# Patient Record
Sex: FEMALE | Race: WHITE | Employment: OTHER | URBAN - METROPOLITAN AREA
[De-identification: names, ages, dates, MRNs, and addresses within clinical notes are randomized per-mention and may not be internally consistent; named-entity substitution may affect disease eponyms.]

---

## 2018-02-05 ENCOUNTER — HOSPITAL ENCOUNTER (OUTPATIENT)
Age: 68
Setting detail: OBSERVATION
Discharge: HOME HEALTH | End: 2018-02-08
Attending: EMERGENCY MEDICINE | Admitting: FAMILY MEDICINE
Payer: MEDICARE

## 2018-02-05 ENCOUNTER — APPOINTMENT (OUTPATIENT)
Dept: CT IMAGING | Age: 68
End: 2018-02-05
Attending: EMERGENCY MEDICINE
Payer: MEDICARE

## 2018-02-05 DIAGNOSIS — G20 PARKINSON'S DISEASE (HCC): Primary | ICD-10-CM

## 2018-02-05 DIAGNOSIS — G25.2 COARSE TREMORS: ICD-10-CM

## 2018-02-05 LAB
ALBUMIN SERPL-MCNC: 4.1 G/DL (ref 3.2–4.6)
ALBUMIN/GLOB SERPL: 1.2 {RATIO} (ref 1.2–3.5)
ALP SERPL-CCNC: 68 U/L (ref 50–136)
ALT SERPL-CCNC: 20 U/L (ref 12–65)
ANION GAP SERPL CALC-SCNC: 15 MMOL/L (ref 7–16)
AST SERPL-CCNC: 34 U/L (ref 15–37)
BASOPHILS # BLD: 0.1 K/UL (ref 0–0.2)
BASOPHILS NFR BLD: 0 % (ref 0–2)
BILIRUB SERPL-MCNC: 1.1 MG/DL (ref 0.2–1.1)
BUN SERPL-MCNC: 20 MG/DL (ref 8–23)
CALCIUM SERPL-MCNC: 9.5 MG/DL (ref 8.3–10.4)
CHLORIDE SERPL-SCNC: 99 MMOL/L (ref 98–107)
CK SERPL-CCNC: 302 U/L (ref 21–215)
CO2 SERPL-SCNC: 25 MMOL/L (ref 21–32)
CREAT SERPL-MCNC: 0.78 MG/DL (ref 0.6–1)
DIFFERENTIAL METHOD BLD: ABNORMAL
EOSINOPHIL # BLD: 0 K/UL (ref 0–0.8)
EOSINOPHIL NFR BLD: 0 % (ref 0.5–7.8)
ERYTHROCYTE [DISTWIDTH] IN BLOOD BY AUTOMATED COUNT: 13.4 % (ref 11.9–14.6)
GLOBULIN SER CALC-MCNC: 3.3 G/DL (ref 2.3–3.5)
GLUCOSE SERPL-MCNC: 63 MG/DL (ref 65–100)
HCT VFR BLD AUTO: 43.4 % (ref 35.8–46.3)
HGB BLD-MCNC: 14.5 G/DL (ref 11.7–15.4)
IMM GRANULOCYTES # BLD: 0 K/UL (ref 0–0.5)
IMM GRANULOCYTES NFR BLD AUTO: 0 % (ref 0–5)
LYMPHOCYTES # BLD: 2.1 K/UL (ref 0.5–4.6)
LYMPHOCYTES NFR BLD: 17 % (ref 13–44)
MCH RBC QN AUTO: 29.4 PG (ref 26.1–32.9)
MCHC RBC AUTO-ENTMCNC: 33.4 G/DL (ref 31.4–35)
MCV RBC AUTO: 87.9 FL (ref 79.6–97.8)
MONOCYTES # BLD: 1.1 K/UL (ref 0.1–1.3)
MONOCYTES NFR BLD: 9 % (ref 4–12)
NEUTS SEG # BLD: 8.9 K/UL (ref 1.7–8.2)
NEUTS SEG NFR BLD: 74 % (ref 43–78)
PLATELET # BLD AUTO: 327 K/UL (ref 150–450)
PMV BLD AUTO: 9.7 FL (ref 10.8–14.1)
POTASSIUM SERPL-SCNC: 4.1 MMOL/L (ref 3.5–5.1)
PROT SERPL-MCNC: 7.4 G/DL (ref 6.3–8.2)
RBC # BLD AUTO: 4.94 M/UL (ref 4.05–5.25)
SODIUM SERPL-SCNC: 139 MMOL/L (ref 136–145)
WBC # BLD AUTO: 12.2 K/UL (ref 4.3–11.1)

## 2018-02-05 PROCEDURE — 99285 EMERGENCY DEPT VISIT HI MDM: CPT | Performed by: EMERGENCY MEDICINE

## 2018-02-05 PROCEDURE — 77030011943

## 2018-02-05 PROCEDURE — 51701 INSERT BLADDER CATHETER: CPT | Performed by: EMERGENCY MEDICINE

## 2018-02-05 PROCEDURE — 85025 COMPLETE CBC W/AUTO DIFF WBC: CPT | Performed by: EMERGENCY MEDICINE

## 2018-02-05 PROCEDURE — 74011250636 HC RX REV CODE- 250/636: Performed by: EMERGENCY MEDICINE

## 2018-02-05 PROCEDURE — 96361 HYDRATE IV INFUSION ADD-ON: CPT | Performed by: EMERGENCY MEDICINE

## 2018-02-05 PROCEDURE — 70450 CT HEAD/BRAIN W/O DYE: CPT

## 2018-02-05 PROCEDURE — 82550 ASSAY OF CK (CPK): CPT | Performed by: EMERGENCY MEDICINE

## 2018-02-05 PROCEDURE — 96376 TX/PRO/DX INJ SAME DRUG ADON: CPT | Performed by: EMERGENCY MEDICINE

## 2018-02-05 PROCEDURE — 81003 URINALYSIS AUTO W/O SCOPE: CPT | Performed by: EMERGENCY MEDICINE

## 2018-02-05 PROCEDURE — 80053 COMPREHEN METABOLIC PANEL: CPT | Performed by: EMERGENCY MEDICINE

## 2018-02-05 PROCEDURE — 96375 TX/PRO/DX INJ NEW DRUG ADDON: CPT | Performed by: EMERGENCY MEDICINE

## 2018-02-05 RX ORDER — LORAZEPAM 2 MG/ML
1 INJECTION INTRAMUSCULAR
Status: COMPLETED | OUTPATIENT
Start: 2018-02-05 | End: 2018-02-05

## 2018-02-05 RX ORDER — PHENAZOPYRIDINE HYDROCHLORIDE 95 MG/1
100 TABLET ORAL
Status: DISCONTINUED | OUTPATIENT
Start: 2018-02-05 | End: 2018-02-05

## 2018-02-05 RX ADMIN — SODIUM CHLORIDE 1000 ML: 900 INJECTION, SOLUTION INTRAVENOUS at 18:30

## 2018-02-05 RX ADMIN — LORAZEPAM 1 MG: 2 INJECTION, SOLUTION INTRAMUSCULAR; INTRAVENOUS at 22:13

## 2018-02-05 RX ADMIN — LORAZEPAM 1 MG: 2 INJECTION, SOLUTION INTRAMUSCULAR; INTRAVENOUS at 18:31

## 2018-02-05 NOTE — IP AVS SNAPSHOT
[address] 322 [facility] 
[phone] Patient: Bhumi Davila MRN: [id number] :1950 About your hospitalization You were admitted on:  2018 You last received care in the:  [facility] 6 MED SURG You were discharged on:  2018 Why you were hospitalized Your primary diagnosis was:  Not on File Your diagnoses also included:  Tremors Of Nervous System, Atrial Fibrillation (Hcc), Presence Of Cardiac Pacemaker, Hypertension, Hyperlipidemia, Parkinson Disease (Hcc), Meningioma (Hcc) Follow-up Information Follow up With Details Comments Contact Info [facility]   [phone] This is the same agency that your  is using. PT and Nursing Alissa Gray MD On 2018 at 11:00 [address] [phone] Aviva Momin MD  as scheduled [facility] [address] [address] 
[phone] Your Scheduled Appointments 2018  3:30 PM EST Follow Up with BSN ROBERT Lopez [facility] [place] ([facility]UR[facility] [place]) [place] [address] [address]  
[phone] Discharge Orders None A check gary indicates which time of day the medication should be taken. My Medications CHANGE how you take these medications Instructions Each Dose to Equal  
 Morning Noon Evening Bedtime * carbidopa-levodopa ER  mg per tablet Commonly known as:  SINEMET CR What changed:  Another medication with the same name was removed. Continue taking this medication, and follow the directions you see here. Your next dose is:  bedtime 1 tab Qhs  
     
   
   
   
  
  
 * carbidopa-levodopa  mg per tablet Commonly known as:  Sinemet-25/250 What changed:  Another medication with the same name was removed.  Continue taking this medication, and follow the directions you see here. Your next dose is:  Resume your home schedule Take 1 Tab by mouth four (4) times daily. 1 Tab * Notice: This list has 2 medication(s) that are the same as other medications prescribed for you. Read the directions carefully, and ask your doctor or other care provider to review them with you. CONTINUE taking these medications Instructions Each Dose to Equal  
 Morning Noon Evening Bedtime FISH OIL 1,000 mg Cap Generic drug:  omega-3 fatty acids-vitamin e Your next dose is:  2-9 Take 1 Cap by mouth. 1 Cap KLOR-CON M20 PO Your next dose is:  2-9 Take  by mouth daily. lovastatin 40 mg tablet Commonly known as:  MEVACOR Your next dose is:  bedtime Take 40 mg by mouth nightly. 40 mg  
    
   
   
   
  
  
 metoprolol succinate 25 mg XL tablet Commonly known as:  TOPROL-XL Your next dose is: This evening Take  by mouth two (2) times a day. PREMARIN 0.625 mg tablet Generic drug:  conjugated estrogens Your next dose is:  2-9 Take  by mouth daily. STOP taking these medications   
 multivitamin tablet Commonly known as:  ONE A DAY  
   
  
 warfarin 5 mg tablet Commonly known as:  COUMADIN Discharge Instructions DISCHARGE SUMMARY from Nurse PATIENT INSTRUCTIONS: 
 
 
F-face looks uneven A-arms unable to move or move unevenly S-speech slurred or non-existent T-time-call 911 as soon as signs and symptoms begin-DO NOT go  
 Back to bed or wait to see if you get better-TIME IS BRAIN. Warning Signs of HEART ATTACK Call 911 if you have these symptoms: 
? Chest discomfort. Most heart attacks involve discomfort in the center of the chest that lasts more than a few minutes, or that goes away and comes back. It can feel like uncomfortable pressure, squeezing, fullness, or pain. ? Discomfort in other areas of the upper body. Symptoms can include pain or discomfort in one or both arms, the back, neck, jaw, or stomach. ? Shortness of breath with or without chest discomfort. ? Other signs may include breaking out in a cold sweat, nausea, or lightheadedness. Don't wait more than five minutes to call [address]! Fast action can save your life. Calling 911 is almost always the fastest way to get lifesaving treatment. Emergency Medical Services staff can begin treatment when they arrive  up to an hour sooner than if someone gets to the hospital by car. The discharge information has been reviewed with the patient. The patient verbalized understanding. Discharge medications reviewed with the patient and appropriate educational materials and side effects teaching were provided. ___________________________________________________________________________________________________________________________________ ACO Transitions of Care Introducing [employer] [address] offers a voluntary care coordination program to provide high quality service and care to [facility] fee-for-service beneficiaries. Analy Jose Luis was designed to help you enhance your health and well-being through the following services: ? Transitions of Care  support for individuals who are transitioning from one care setting to another (example: Hospital to home). ?  Chronic and Complex Care Coordination  support for individuals and caregivers of those with serious or chronic illnesses or with more than one chronic (ongoing) condition and those who take a number of different medications. If you meet specific medical criteria, a [facility] [address] may call you directly to coordinate your care with your primary care physician and your other care providers. For questions about the [facility] programs, please, contact your physicians office. For general questions or additional information about Accountable Care Organizations: 
Please visit www.medicare.gov/acos. html or call 1-800-MEDICARE (1-714.826.4207) TTY users should call 0-936.238.1444. Overblog Announcement We are excited to announce that we are making your provider's discharge notes available to you in Overblog. You will see these notes when they are completed and signed by the physician that discharged you from your recent hospital stay. If you have any questions or concerns about any information you see in Overblog, please call the Health Information Department where you were seen or reach out to your Primary Care Provider for more information about your plan of care. Introducing [address]! Dear Laray Sever: 
Thank you for requesting a Overblog account. Our records indicate that you already have an active Overblog account. You can access your account anytime at [email or web address] Did you know that you can access your hospital and ER discharge instructions at any time in Overblog? You can also review all of your test results from your hospital stay or ER visit. Additional Information If you have questions, please visit the Frequently Asked Questions section of the Overblog website at [email or web address] Remember, Overblog is NOT to be used for urgent needs. For medical emergencies, dial 911. Now available from your iPhone and Android! Unresulted Labs-Please follow up with your PCP about these lab tests Order Current Status JOI QL, W/REFLEX CASCADE In process CULTURE, BLOOD Preliminary result CULTURE, BLOOD Preliminary result Providers Seen During Your Hospitalization Provider Specialty Primary office phone Erasmo Mix MD Emergency Medicine [phone] Kenna Toussaint MD Emergency Medicine [phone] Disha Ray MD Emergency Medicine [phone] José Zeng MD [facility] [phone] Immunizations Administered for This Admission Name Date  
 TB Skin Test (PPD) Intradermal  Deferred (), 2/6/2018 Your Primary Care Physician (PCP) Primary Care Physician Office Phone Office Fax [facility] [phone] You are allergic to the following Allergen Reactions Codeine Unknown (comments) Penicillins Unknown (comments) Sulfa (Sulfonamide Antibiotics) Unknown (comments) Recent Documentation Height Weight BMI Smoking Status 1.626 m 47.6 kg 18.02 kg/m2 Never Smoker Emergency Contacts Name Discharge Info Relation Home Work Mobile CliftonKevin DISCHARGE CAREGIVER [3] Spouse [3] [phone] Summer CAREGIVER [3] Son [22] [phone] Patient Belongings The following personal items are in your possession at time of discharge: 
     Visual Aid: None Discharge Instructions Attachments/References TREMOR: BENIGN ESSENTIAL (ENGLISH) Patient Handouts Benign Essential Tremor: Care Instructions Your Care Instructions Benign essential tremor is a medical term for shaking that you can't control. Your hand or fingers may shake when you lift a cup or point at something. Or your voice may shake when you speak. This type of tremor is not harmful. It is not caused by a stroke or Parkinson's disease. Some things can affect how much you shake. For example, drinking or eating something with caffeine may make tremors worse for a while. Some medicines also can increase tremors. These include antidepressants and too much thyroid replacement. Talk to your doctor if you think one of your medicines makes your tremors worse. If you are self-conscious about your tremors, there are some things you can do to reduce them or make them less noticeable. This includes taking medicine. Follow-up care is a key part of your treatment and safety. Be sure to make and go to all appointments, and call your doctor if you are having problems. It's also a good idea to know your test results and keep a list of the medicines you take. How can you care for yourself at home? · Take your medicines exactly as prescribed. Call your doctor if you think you are having a problem with your medicine. Some medicines that help control tremors have to be taken every day, even if you are not having tremors. You will get more details on the specific medicines your doctor prescribes. · Get plenty of rest. 
· Eat a balanced, healthy diet. · Try to reduce stress. Regular exercise and massages may help. · Limit alcohol. Heavy drinking can make your tremors worse. · Avoid drinks or foods with caffeine if they make your tremors worse. These include tea, cola, coffee, and chocolate. · Wear a heavy bracelet or watch. This adds a little weight to your hand. The extra weight may reduce tremors. · Drink from cups or glasses that are only half full. You may also want to try drinking with a straw. When should you call for help? Watch closely for changes in your health, and be sure to contact your doctor if: 
? · You notice your tremors are getting worse. ? · You can't do your everyday activities because of your tremors. ? · You are sad and embarrassed about your shaking. Where can you learn more? Go to http://rylee-harvey.info/. Enter B746 in the search box to learn more about \"Benign Essential Tremor: Care Instructions. \" Current as of: October 14, 2016 Content Version: 11.4 © 2006-2017 Healthwise, Incorporated. Care instructions adapted under license by [employer] (which disclaims liability or warranty for this information). If you have questions about a medical condition or this instruction, always ask your healthcare professional. [address] any warranty or liability for your use of this information. Please provide this summary of care documentation to your next provider. Signatures-by signing, you are acknowledging that this After Visit Summary has been reviewed with you and you have received a copy. Patient Signature:  ____________________________________________________________ Date:  ____________________________________________________________  
  
Radha Medico Provider Signature:  ____________________________________________________________ Date:  ____________________________________________________________

## 2018-02-05 NOTE — ED PROVIDER NOTES
HPI Comments: 3:19 PM Pt was seen in triage, brief history and physical obtained and orders placed. Awaiting bed in the back. Shaila Hanley MD    76-year-old female with known Parkinson's and significant baseline tremors states that her tremors have gotten worse over the past month. Patient takes Sinemet as prescribed. Denies any infectious symptoms such as fever, dysuria, cough. When asked why this might have occurred she said it could be related to her eating a lot of honey yesterday. Patient is a 75 y.o. female presenting with tremors. The history is provided by the patient. No  was used. Tremors           Past Medical History:   Diagnosis Date    Arrhythmia 7/14/2016    Atrial fibrillation (HCC) 8/24/2015    Dry eye     Heart disease 7/14/2016    High blood pressure 7/14/2016    History of basal cell cancer 7/14/2016    Hyperlipidemia 7/14/2016    Hypertension 7/14/2016    Presence of cardiac pacemaker 8/24/2015    Tremor 7/14/2016       Past Surgical History:   Procedure Laterality Date    HX COLONOSCOPY  12/2011    HX HEART CATHETERIZATION      HX OTHER SURGICAL      Cardiac Electrophysiology Mapping and Ablation    HX OTHER SURGICAL      Lung Biopsy    HX OTHER SURGICAL  1982    skin cancer excision, basal cell carcinoma on forehead    HX OTHER SURGICAL  6/12    Cardiac pacemaker placement    HX PARTIAL HYSTERECTOMY      Abdominal     HX SALPINGO-OOPHORECTOMY      Laparoscopic, with MARIA ELENA    HX TONSILLECTOMY           Family History:   Problem Relation Age of Onset    Heart Disease Mother     Thyroid Disease Mother     Heart Disease Father     Cancer Father      Lung Cancer       Social History     Social History    Marital status:      Spouse name: N/A    Number of children: N/A    Years of education: N/A     Occupational History    Not on file.      Social History Main Topics    Smoking status: Never Smoker    Smokeless tobacco: Never Used   [facility] Alcohol use No    Drug use: No    Sexual activity: Not on file     Other Topics Concern    Not on file     Social History Narrative         ALLERGIES: Codeine; Penicillins; and Sulfa (sulfonamide antibiotics)    Review of Systems   Neurological: Positive for tremors. Vitals:    02/05/18 1516   BP: 134/73   Resp: 18   Temp: 97.7 °F (36.5 °C)   Weight: 47.6 kg (105 lb)   Height: 5' 4\" (1.626 m)            Physical Exam   Constitutional: She is oriented to person, place, and time. She appears well-developed and well-nourished. No distress. Disheveled, unkempt female   HENT:   Head: Normocephalic and atraumatic. Eyes: Conjunctivae and EOM are normal. Pupils are equal, round, and reactive to light. Neck: Normal range of motion. Neck supple. Cardiovascular: Normal rate, regular rhythm and normal heart sounds. Pulmonary/Chest: Effort normal and breath sounds normal. No respiratory distress. She has no wheezes. She has no rales. Abdominal: Soft. She exhibits no distension. There is no tenderness. There is no rebound. Musculoskeletal: Normal range of motion. She exhibits no edema or tenderness. Neurological: She is alert and oriented to person, place, and time. Severe upper extremity tremulousness and shaking. Normal speech. Symmetric smile. Good strength in bilateral upper and lower extremities. Skin: Skin is warm and dry. No rash noted. She is not diaphoretic. Psychiatric: She has a normal mood and affect. Her behavior is normal.   Vitals reviewed. MDM  Number of Diagnoses or Management Options  Parkinson's disease [facility]): new and does not require workup  Diagnosis management comments: CT demonstrates meningioma which could be contributing to the patient's behavior. Discussed admission with hospitalist who suggested a neuro consult. Total neurology has been called. Patient will be signed out to Dr. Mario Whitaker pending  Results and recommendations.        Amount and/or Complexity of Data Reviewed  Clinical lab tests: reviewed and ordered (Results for orders placed or performed during the hospital encounter of 02/05/18  -CBC WITH AUTOMATED DIFF       Result                                            Value                         Ref Range                       WBC                                               12.2 (H)                      4.3 - 11.1 K/uL                 RBC                                               4.94                          4.05 - 5.25 M/uL                HGB                                               14.5                          11.7 - 15.4 g/dL                HCT                                               43.4                          35.8 - 46.3 %                   MCV                                               87.9                          79.6 - 97.8 FL                  MCH                                               29.4                          26.1 - 32.9 PG                  MCHC                                              33.4                          31.4 - 35.0 g/dL                RDW                                               13.4                          11.9 - 14.6 %                   PLATELET                                          327                           150 - 450 K/uL                  MPV                                               9.7 (L)                       10.8 - 14.1 FL                  DF                                                AUTOMATED                                                     NEUTROPHILS                                       74                            43 - 78 %                       LYMPHOCYTES                                       17                            13 - 44 %                       MONOCYTES                                         9                             4.0 - 12.0 %                    EOSINOPHILS                                       0 (L)                         0.5 - 7.8 % BASOPHILS                                         0                             0.0 - 2.0 %                     IMMATURE GRANULOCYTES                             0                             0.0 - 5.0 %                     ABS. NEUTROPHILS                                  8.9 (H)                       1.7 - 8.2 K/UL                  ABS. LYMPHOCYTES                                  2.1                           0.5 - 4.6 K/UL                  ABS. MONOCYTES                                    1.1                           0.1 - 1.3 K/UL                  ABS. EOSINOPHILS                                  0.0                           0.0 - 0.8 K/UL                  ABS. BASOPHILS                                    0.1                           0.0 - 0.2 K/UL                  ABS. IMM.  GRANS.                                  0.0                           0.0 - 0.5 K/UL             -METABOLIC PANEL, COMPREHENSIVE       Result                                            Value                         Ref Range                       Sodium                                            139                           136 - 145 mmol/L                Potassium                                         4.1                           3.5 - 5.1 mmol/L                Chloride                                          99                            98 - 107 mmol/L                 CO2                                               25                            21 - 32 mmol/L                  Anion gap                                         15                            7 - 16 mmol/L                   Glucose                                           63 (L)                        65 - 100 mg/dL                  BUN                                               20                            8 - 23 MG/DL                    Creatinine                                        0.78                          0.6 - 1.0 MG/DL                 GFR est AA                                        >60                           >60 ml/min/1.73m2               GFR est non-AA                                    >60                           >60 ml/min/1.73m2               Calcium                                           9.5                           8.3 - 10.4 MG/DL                Bilirubin, total                                  1.1                           0.2 - 1.1 MG/DL                 ALT (SGPT)                                        20                            12 - 65 U/L                     AST (SGOT)                                        34                            15 - 37 U/L                     Alk. phosphatase                                  68                            50 - 136 U/L                    Protein, total                                    7.4                           6.3 - 8.2 g/dL                  Albumin                                           4.1                           3.2 - 4.6 g/dL                  Globulin                                          3.3                           2.3 - 3.5 g/dL                  A-G Ratio                                         1.2                           1.2 - 3.5                  -CK       Result                                            Value                         Ref Range                       CK                                                302 (H)                       21 - 215 U/L               )  Tests in the radiology section of CPT®: ordered and reviewed (Ct Head Wo Cont    Result Date: 2/5/2018  CT HEAD WITHOUT CONTRAST HISTORY:  Tremors. . COMPARISON: None. TECHNIQUE: Axial imaging was performed without intravenous contrast utilizing 5mm slice thickness. Sagittal and coronal reformats were performed. Radiation dose reduction techniques were used for this study.  Our CT scanner uses one or all of the following: Automated exposure control, adjustment of the MAS or KUB according to patient's size and iterative reconstruction. FINDINGS:    *BRAIN:    -  There are no early signs of territorial or lacunar infarction by CT.    -  Round well-defined hyperdense mass in the right superior parietal parafalcine region measuring 1.7 x 1.4 cm.    -  For patient's age, the scattered areas of white matter hypodensities may represent a chronic small vessel white matter ischemia. However this is nonspecific. *VISUALIZED PARANASAL SINUSES: Well aerated. *MASTOIDS:  Clear. *CALVARIUM AND SCALP: Unremarkable. IMPRESSION: No acute abnormalities. Findings most consistent with a right superior medial parietal meningioma. Recommend MRI with and without contrast for confirmation. Date of Dictation: 2/5/2018 10:27 PM     )  Review and summarize past medical records: yes  Discuss the patient with other providers: yes  Independent visualization of images, tracings, or specimens: yes    Risk of Complications, Morbidity, and/or Mortality  Presenting problems: moderate  Diagnostic procedures: moderate  Management options: moderate    Patient Progress  Patient progress: stable        ED Course   Comment By Time   Discussed admission with hospitalist who recommends [facility] neurology consult. They have been paged. Awaiting call back. Patient resting continues to exhibit severe tremors. No previous mention of meningioma. No previous CT scans in care everywhere for in [address] [place] [address]. We'll need to transition care to physician Man pending Teleneurology consult and discussion of final disposition.  Brandon Hemphill MD 02/05 5919       Procedures

## 2018-02-05 NOTE — ED TRIAGE NOTES
Pt arrived via EMS with c/o shaking more than her usual shaking X1 month, pt with history of parkinsons disease.

## 2018-02-06 ENCOUNTER — APPOINTMENT (OUTPATIENT)
Dept: MRI IMAGING | Age: 68
End: 2018-02-06
Attending: FAMILY MEDICINE
Payer: MEDICARE

## 2018-02-06 ENCOUNTER — APPOINTMENT (OUTPATIENT)
Dept: GENERAL RADIOLOGY | Age: 68
End: 2018-02-06
Payer: MEDICARE

## 2018-02-06 PROBLEM — D32.9 MENINGIOMA (HCC): Status: ACTIVE | Noted: 2018-02-06

## 2018-02-06 PROBLEM — R25.1 TREMORS OF NERVOUS SYSTEM: Status: ACTIVE | Noted: 2018-02-06

## 2018-02-06 LAB
AMMONIA PLAS-SCNC: <10 UMOL/L (ref 11–32)
AMORPH CRY URNS QL MICRO: ABNORMAL
AMPHET UR QL SCN: NEGATIVE
ANION GAP SERPL CALC-SCNC: 9 MMOL/L (ref 7–16)
APTT PPP: 49.3 SEC (ref 23.2–35.3)
BACTERIA URNS QL MICRO: ABNORMAL /HPF
BARBITURATES UR QL SCN: NEGATIVE
BASOPHILS # BLD: 0 K/UL (ref 0–0.2)
BASOPHILS NFR BLD: 0 % (ref 0–2)
BENZODIAZ UR QL: NEGATIVE
BUN SERPL-MCNC: 24 MG/DL (ref 8–23)
CALCIUM SERPL-MCNC: 8.4 MG/DL (ref 8.3–10.4)
CANNABINOIDS UR QL SCN: NEGATIVE
CASTS URNS QL MICRO: ABNORMAL /LPF
CHLORIDE SERPL-SCNC: 108 MMOL/L (ref 98–107)
CO2 SERPL-SCNC: 26 MMOL/L (ref 21–32)
COCAINE UR QL SCN: NEGATIVE
CREAT SERPL-MCNC: 0.76 MG/DL (ref 0.6–1)
CRYSTALS URNS QL MICRO: 0 /LPF
DIFFERENTIAL METHOD BLD: ABNORMAL
EOSINOPHIL # BLD: 0 K/UL (ref 0–0.8)
EOSINOPHIL NFR BLD: 0 % (ref 0.5–7.8)
EPI CELLS #/AREA URNS HPF: ABNORMAL /HPF
ERYTHROCYTE [DISTWIDTH] IN BLOOD BY AUTOMATED COUNT: 13.4 % (ref 11.9–14.6)
ERYTHROCYTE [SEDIMENTATION RATE] IN BLOOD: 6 MM/HR (ref 0–30)
FOLATE SERPL-MCNC: 46.3 NG/ML (ref 3.1–17.5)
GLUCOSE BLD-MCNC: 95 MG/DL (ref 65–100)
GLUCOSE SERPL-MCNC: 84 MG/DL (ref 65–100)
HCT VFR BLD AUTO: 35 % (ref 35.8–46.3)
HGB BLD-MCNC: 11.7 G/DL (ref 11.7–15.4)
IMM GRANULOCYTES # BLD: 0 K/UL (ref 0–0.5)
IMM GRANULOCYTES NFR BLD AUTO: 0 % (ref 0–5)
INR PPP: 6
INR PPP: 6.8
LACTATE BLD-SCNC: 0.7 MMOL/L (ref 0.5–1.9)
LYMPHOCYTES # BLD: 1.7 K/UL (ref 0.5–4.6)
LYMPHOCYTES NFR BLD: 23 % (ref 13–44)
MCH RBC QN AUTO: 29.4 PG (ref 26.1–32.9)
MCHC RBC AUTO-ENTMCNC: 33.4 G/DL (ref 31.4–35)
MCV RBC AUTO: 87.9 FL (ref 79.6–97.8)
METHADONE UR QL: NEGATIVE
MONOCYTES # BLD: 0.7 K/UL (ref 0.1–1.3)
MONOCYTES NFR BLD: 10 % (ref 4–12)
MUCOUS THREADS URNS QL MICRO: 0 /LPF
NEUTS SEG # BLD: 4.9 K/UL (ref 1.7–8.2)
NEUTS SEG NFR BLD: 67 % (ref 43–78)
OPIATES UR QL: NEGATIVE
OTHER OBSERVATIONS,UCOM: ABNORMAL
PCP UR QL: NEGATIVE
PLATELET # BLD AUTO: 225 K/UL (ref 150–450)
PMV BLD AUTO: 9.3 FL (ref 10.8–14.1)
POTASSIUM SERPL-SCNC: 3.8 MMOL/L (ref 3.5–5.1)
PROTHROMBIN TIME: 54 SEC (ref 11.5–14.5)
PROTHROMBIN TIME: 59.5 SEC (ref 11.5–14.5)
RBC # BLD AUTO: 3.98 M/UL (ref 4.05–5.25)
RBC #/AREA URNS HPF: ABNORMAL /HPF
SODIUM SERPL-SCNC: 143 MMOL/L (ref 136–145)
T3 SERPL-MCNC: 0.72 NG/ML (ref 0.6–1.81)
T4 FREE SERPL-MCNC: 1.4 NG/DL (ref 0.78–1.46)
TSH SERPL DL<=0.005 MIU/L-ACNC: 0.17 UIU/ML (ref 0.36–3.74)
VIT B12 SERPL-MCNC: 1191 PG/ML (ref 193–986)
WBC # BLD AUTO: 7.3 K/UL (ref 4.3–11.1)
WBC URNS QL MICRO: ABNORMAL /HPF

## 2018-02-06 PROCEDURE — 84443 ASSAY THYROID STIM HORMONE: CPT

## 2018-02-06 PROCEDURE — 92610 EVALUATE SWALLOWING FUNCTION: CPT

## 2018-02-06 PROCEDURE — 74011000258 HC RX REV CODE- 258: Performed by: FAMILY MEDICINE

## 2018-02-06 PROCEDURE — 74011000302 HC RX REV CODE- 302: Performed by: INTERNAL MEDICINE

## 2018-02-06 PROCEDURE — 85025 COMPLETE CBC W/AUTO DIFF WBC: CPT | Performed by: FAMILY MEDICINE

## 2018-02-06 PROCEDURE — 85652 RBC SED RATE AUTOMATED: CPT

## 2018-02-06 PROCEDURE — 85610 PROTHROMBIN TIME: CPT

## 2018-02-06 PROCEDURE — 82607 VITAMIN B-12: CPT

## 2018-02-06 PROCEDURE — 84439 ASSAY OF FREE THYROXINE: CPT | Performed by: INTERNAL MEDICINE

## 2018-02-06 PROCEDURE — 96361 HYDRATE IV INFUSION ADD-ON: CPT | Performed by: EMERGENCY MEDICINE

## 2018-02-06 PROCEDURE — 99218 HC RM OBSERVATION: CPT

## 2018-02-06 PROCEDURE — 96365 THER/PROPH/DIAG IV INF INIT: CPT | Performed by: EMERGENCY MEDICINE

## 2018-02-06 PROCEDURE — 74011250636 HC RX REV CODE- 250/636

## 2018-02-06 PROCEDURE — 86580 TB INTRADERMAL TEST: CPT | Performed by: INTERNAL MEDICINE

## 2018-02-06 PROCEDURE — 82140 ASSAY OF AMMONIA: CPT

## 2018-02-06 PROCEDURE — 80048 BASIC METABOLIC PNL TOTAL CA: CPT | Performed by: FAMILY MEDICINE

## 2018-02-06 PROCEDURE — 84480 ASSAY TRIIODOTHYRONINE (T3): CPT | Performed by: FAMILY MEDICINE

## 2018-02-06 PROCEDURE — 82746 ASSAY OF FOLIC ACID SERUM: CPT

## 2018-02-06 PROCEDURE — G8997 SWALLOW GOAL STATUS: HCPCS

## 2018-02-06 PROCEDURE — 74011250637 HC RX REV CODE- 250/637: Performed by: FAMILY MEDICINE

## 2018-02-06 PROCEDURE — G8998 SWALLOW D/C STATUS: HCPCS

## 2018-02-06 PROCEDURE — 81015 MICROSCOPIC EXAM OF URINE: CPT

## 2018-02-06 PROCEDURE — 82962 GLUCOSE BLOOD TEST: CPT

## 2018-02-06 PROCEDURE — 74011250636 HC RX REV CODE- 250/636: Performed by: FAMILY MEDICINE

## 2018-02-06 PROCEDURE — 71045 X-RAY EXAM CHEST 1 VIEW: CPT

## 2018-02-06 PROCEDURE — 85730 THROMBOPLASTIN TIME PARTIAL: CPT

## 2018-02-06 PROCEDURE — 95816 EEG AWAKE AND DROWSY: CPT | Performed by: FAMILY MEDICINE

## 2018-02-06 PROCEDURE — G8996 SWALLOW CURRENT STATUS: HCPCS

## 2018-02-06 PROCEDURE — 87040 BLOOD CULTURE FOR BACTERIA: CPT

## 2018-02-06 PROCEDURE — 80307 DRUG TEST PRSMV CHEM ANLYZR: CPT

## 2018-02-06 PROCEDURE — 83605 ASSAY OF LACTIC ACID: CPT

## 2018-02-06 RX ORDER — SODIUM CHLORIDE 0.9 % (FLUSH) 0.9 %
5-10 SYRINGE (ML) INJECTION AS NEEDED
Status: DISCONTINUED | OUTPATIENT
Start: 2018-02-06 | End: 2018-02-08 | Stop reason: HOSPADM

## 2018-02-06 RX ORDER — SODIUM CHLORIDE 9 MG/ML
100 INJECTION, SOLUTION INTRAVENOUS CONTINUOUS
Status: DISCONTINUED | OUTPATIENT
Start: 2018-02-06 | End: 2018-02-08 | Stop reason: HOSPADM

## 2018-02-06 RX ORDER — CARBIDOPA AND LEVODOPA 25; 250 MG/1; MG/1
1 TABLET ORAL 4 TIMES DAILY
Status: DISCONTINUED | OUTPATIENT
Start: 2018-02-06 | End: 2018-02-08 | Stop reason: HOSPADM

## 2018-02-06 RX ORDER — ACETAMINOPHEN 325 MG/1
650 TABLET ORAL
Status: DISCONTINUED | OUTPATIENT
Start: 2018-02-06 | End: 2018-02-08 | Stop reason: HOSPADM

## 2018-02-06 RX ORDER — SIMVASTATIN 20 MG/1
20 TABLET, FILM COATED ORAL
Status: DISCONTINUED | OUTPATIENT
Start: 2018-02-06 | End: 2018-02-08 | Stop reason: HOSPADM

## 2018-02-06 RX ORDER — ONDANSETRON 2 MG/ML
4 INJECTION INTRAMUSCULAR; INTRAVENOUS
Status: DISCONTINUED | OUTPATIENT
Start: 2018-02-06 | End: 2018-02-08 | Stop reason: HOSPADM

## 2018-02-06 RX ORDER — METOPROLOL SUCCINATE 25 MG/1
25 TABLET, EXTENDED RELEASE ORAL EVERY 12 HOURS
Status: DISCONTINUED | OUTPATIENT
Start: 2018-02-06 | End: 2018-02-08 | Stop reason: HOSPADM

## 2018-02-06 RX ORDER — CARBIDOPA AND LEVODOPA 25; 100 MG/1; MG/1
2 TABLET, EXTENDED RELEASE ORAL
Status: DISCONTINUED | OUTPATIENT
Start: 2018-02-06 | End: 2018-02-08 | Stop reason: HOSPADM

## 2018-02-06 RX ORDER — POTASSIUM CHLORIDE 20 MEQ/1
20 TABLET, EXTENDED RELEASE ORAL DAILY
Status: DISCONTINUED | OUTPATIENT
Start: 2018-02-06 | End: 2018-02-08 | Stop reason: HOSPADM

## 2018-02-06 RX ORDER — METOPROLOL SUCCINATE 50 MG/1
25 TABLET, EXTENDED RELEASE ORAL 2 TIMES DAILY
Status: DISCONTINUED | OUTPATIENT
Start: 2018-02-06 | End: 2018-02-06 | Stop reason: SDUPTHER

## 2018-02-06 RX ORDER — SODIUM CHLORIDE 0.9 % (FLUSH) 0.9 %
5-10 SYRINGE (ML) INJECTION EVERY 8 HOURS
Status: DISCONTINUED | OUTPATIENT
Start: 2018-02-06 | End: 2018-02-08 | Stop reason: HOSPADM

## 2018-02-06 RX ORDER — ADHESIVE BANDAGE
30 BANDAGE TOPICAL DAILY PRN
Status: DISCONTINUED | OUTPATIENT
Start: 2018-02-06 | End: 2018-02-08 | Stop reason: HOSPADM

## 2018-02-06 RX ADMIN — PHYTONADIONE 10 MG: 10 INJECTION, EMULSION INTRAMUSCULAR; INTRAVENOUS; SUBCUTANEOUS at 03:59

## 2018-02-06 RX ADMIN — Medication 10 ML: at 22:26

## 2018-02-06 RX ADMIN — METOPROLOL SUCCINATE 25 MG: 25 TABLET, EXTENDED RELEASE ORAL at 10:17

## 2018-02-06 RX ADMIN — CARBIDOPA AND LEVODOPA 1 TABLET: 25; 250 TABLET ORAL at 12:58

## 2018-02-06 RX ADMIN — CARBIDOPA AND LEVODOPA 2 TABLET: 25; 100 TABLET, EXTENDED RELEASE ORAL at 22:26

## 2018-02-06 RX ADMIN — Medication 10 ML: at 14:00

## 2018-02-06 RX ADMIN — SIMVASTATIN 20 MG: 20 TABLET, FILM COATED ORAL at 22:26

## 2018-02-06 RX ADMIN — ESTROGENS, CONJUGATED 0.62 MG: 0.62 TABLET, FILM COATED ORAL at 10:17

## 2018-02-06 RX ADMIN — POTASSIUM CHLORIDE 20 MEQ: 20 TABLET, EXTENDED RELEASE ORAL at 10:17

## 2018-02-06 RX ADMIN — METOPROLOL SUCCINATE 25 MG: 25 TABLET, EXTENDED RELEASE ORAL at 20:49

## 2018-02-06 RX ADMIN — TUBERCULIN PURIFIED PROTEIN DERIVATIVE 5 UNITS: 5 INJECTION INTRADERMAL at 17:00

## 2018-02-06 RX ADMIN — CARBIDOPA AND LEVODOPA 1 TABLET: 25; 250 TABLET ORAL at 17:06

## 2018-02-06 RX ADMIN — CARBIDOPA AND LEVODOPA 1 TABLET: 25; 250 TABLET ORAL at 05:44

## 2018-02-06 RX ADMIN — SODIUM CHLORIDE 100 ML/HR: 900 INJECTION, SOLUTION INTRAVENOUS at 01:08

## 2018-02-06 RX ADMIN — CARBIDOPA AND LEVODOPA 1 TABLET: 25; 250 TABLET ORAL at 20:49

## 2018-02-06 NOTE — PROGRESS NOTES
Per Jeffrey Goodwin the MRI is on hold due to patient condition. Exam may need to be done with anesthesia. The MRI has to be coordinated with [facility] rep.

## 2018-02-06 NOTE — H&P
HOSPITALIST INITIAL HISTORY AND PHYSICAL    NAME:  Chris Roberson   Age:  75 y.o.  :   1950   MRN:   [id number]  PCP: Renay Burgos MD  Consulting MD:  Treatment Team: Attending Provider: Susie Grover MD; Primary Nurse: Mone Bonilla; Primary Nurse: Carola Do; Primary Nurse: Zulay Mason RN    CHIEF COMPLAINT: worsening shaking    HISTORY OF PRESENT ILLNESS:   Chris Roberson is a 75y.o. year-old female with a past medical history of Parkinson disease and atrial fibrillation on chronic coumadin who presents to the ER with complaint of worsening tremors and shaking. The patient is very somnolent at the time of my exam and cannot give any meaningful information. Per ER record, she take sinemet as prescribed and was concerned that these symptoms may have been caused by her eating a lot of honey 2 days ago. [facility] REVIEW OF SYSTEMS: Comprehensive ROS unable to be obtained given patient's somnolence. Past Medical History:   Diagnosis Date    Arrhythmia 2016    Atrial fibrillation (HCC) 2015    Dry eye     Heart disease 2016    High blood pressure 2016    History of basal cell cancer 2016    Hyperlipidemia 2016    Hypertension 2016    Presence of cardiac pacemaker 2015    Tremor 2016        Past Surgical History:   Procedure Laterality Date    HX COLONOSCOPY  2011    HX HEART CATHETERIZATION      HX OTHER SURGICAL      Cardiac Electrophysiology Mapping and Ablation    HX OTHER SURGICAL      Lung Biopsy    HX OTHER SURGICAL  1982    skin cancer excision, basal cell carcinoma on forehead    HX OTHER SURGICAL      Cardiac pacemaker placement    HX PARTIAL HYSTERECTOMY      Abdominal     HX SALPINGO-OOPHORECTOMY      Laparoscopic, with MARIA ELENA    HX TONSILLECTOMY         Prior to Admission Medications   Prescriptions Last Dose Informant Patient Reported? Taking?    POTASSIUM CHLORIDE (KLOR-CON M20 PO)   Yes No   Sig: Take  by mouth daily. carbidopa-levodopa (SINEMET-25/250)  mg per tablet   No No   Sig: Take 1 Tab by mouth four (4) times daily. carbidopa-levodopa CR (SINEMET CR)  mg per tablet   No No   Si tab Qhs   conjugated estrogens (PREMARIN) 0.625 mg tablet   Yes No   Sig: Take  by mouth daily. lovastatin (MEVACOR) 40 mg tablet   Yes No   Sig: Take 40 mg by mouth nightly. metoprolol succinate (TOPROL-XL) 25 mg XL tablet   Yes No   Sig: Take  by mouth two (2) times a day. omega-3 fatty acids-vitamin e (FISH OIL) 1,000 mg cap   Yes No   Sig: Take 1 Cap by mouth. Facility-Administered Medications: None       Allergies   Allergen Reactions    Codeine Unknown (comments)    Penicillins Unknown (comments)    Sulfa (Sulfonamide Antibiotics) Unknown (comments)       FAMILY HISTORY: Reviewed. Negative except   Family History   Problem Relation Age of Onset    Heart Disease Mother     Thyroid Disease Mother     Heart Disease Father     Cancer Father      Lung Cancer       Social History   Substance Use Topics    Smoking status: Never Smoker    Smokeless tobacco: Never Used    Alcohol use No         Objective:     Visit Vitals    BP (!) 158/100 (BP 1 Location: Left arm, BP Patient Position: At rest)    Pulse 66    Temp 97.7 °F (36.5 °C)    Resp 18    Ht 5' 4\" (1.626 m)    Wt 47.6 kg (105 lb)    SpO2 97%    BMI 18.02 kg/m2      Temp (24hrs), Av.7 °F (36.5 °C), Min:97.7 °F (36.5 °C), Max:97.7 °F (36.5 °C)    Oxygen Therapy  O2 Sat (%): 97 % (18 0230)  Pulse via Oximetry: 66 beats per minute (18 0230)  O2 Device: Room air (18 5337)  Physical Exam:  General:    The patient is a very somnolent elderly female in no acute distress. Head:   Normocephalic/atraumatic. Eyes:  No palpebral pallor or scleral icterus. ENT:  External auricular and nasal exam within normal limits. Mucous membranes are moist.  Neck:  Supple, non-tender, no JVD.   Lungs:   No respiratory distress or accessory muscle use. Heart:   Regular rate and rhythm. Abdomen:   Soft, non-tender, non-distended with normoactive bowel sounds. Genitourinary: No tenderness over the bladder or bilateral CVAs. Extremities: Without clubbing, cyanosis, or edema. Skin:     Normal color, texture, and turgor. No rashes, lesions, or jaundice. Pulses: Radial and dorsalis pedis pulses present 2+ bilaterally. Capillary refill <2s. Neurologic: CN II-XII grossly intact and symmetrical.     Severe high amplitude intention tremor present when awake, no tremor when asleep. Bilateral cogwheel rigidity  Psychiatric: Very somnolent, minimally arousable to voice, mostly non-verbal.     Data Review:   Recent Results (from the past 24 hour(s))   CBC WITH AUTOMATED DIFF    Collection Time: 02/05/18  3:20 PM   Result Value Ref Range    WBC 12.2 (H) 4.3 - 11.1 K/uL    RBC 4.94 4.05 - 5.25 M/uL    HGB 14.5 11.7 - 15.4 g/dL    HCT 43.4 35.8 - 46.3 %    MCV 87.9 79.6 - 97.8 FL    MCH 29.4 26.1 - 32.9 PG    MCHC 33.4 31.4 - 35.0 g/dL    RDW 13.4 11.9 - 14.6 %    PLATELET 945 654 - 830 K/uL    MPV 9.7 (L) 10.8 - 14.1 FL    DF AUTOMATED      NEUTROPHILS 74 43 - 78 %    LYMPHOCYTES 17 13 - 44 %    MONOCYTES 9 4.0 - 12.0 %    EOSINOPHILS 0 (L) 0.5 - 7.8 %    BASOPHILS 0 0.0 - 2.0 %    IMMATURE GRANULOCYTES 0 0.0 - 5.0 %    ABS. NEUTROPHILS 8.9 (H) 1.7 - 8.2 K/UL    ABS. LYMPHOCYTES 2.1 0.5 - 4.6 K/UL    ABS. MONOCYTES 1.1 0.1 - 1.3 K/UL    ABS. EOSINOPHILS 0.0 0.0 - 0.8 K/UL    ABS. BASOPHILS 0.1 0.0 - 0.2 K/UL    ABS. IMM.  GRANS. 0.0 0.0 - 0.5 K/UL   METABOLIC PANEL, COMPREHENSIVE    Collection Time: 02/05/18  3:20 PM   Result Value Ref Range    Sodium 139 136 - 145 mmol/L    Potassium 4.1 3.5 - 5.1 mmol/L    Chloride 99 98 - 107 mmol/L    CO2 25 21 - 32 mmol/L    Anion gap 15 7 - 16 mmol/L    Glucose 63 (L) 65 - 100 mg/dL    BUN 20 8 - 23 MG/DL    Creatinine 0.78 0.6 - 1.0 MG/DL    GFR est AA >60 >60 ml/min/1.73m2    GFR est non-AA >60 >60 ml/min/1.73m2    Calcium 9.5 8.3 - 10.4 MG/DL    Bilirubin, total 1.1 0.2 - 1.1 MG/DL    ALT (SGPT) 20 12 - 65 U/L    AST (SGOT) 34 15 - 37 U/L    Alk.  phosphatase 68 50 - 136 U/L    Protein, total 7.4 6.3 - 8.2 g/dL    Albumin 4.1 3.2 - 4.6 g/dL    Globulin 3.3 2.3 - 3.5 g/dL    A-G Ratio 1.2 1.2 - 3.5     CK    Collection Time: 02/05/18  3:20 PM   Result Value Ref Range     (H) 21 - 215 U/L   PROTHROMBIN TIME + INR    Collection Time: 02/06/18  1:04 AM   Result Value Ref Range    Prothrombin time 54.0 (H) 11.5 - 14.5 sec    INR 6.0 (HH)     PTT    Collection Time: 02/06/18  1:04 AM   Result Value Ref Range    aPTT 49.3 (H) 23.2 - 35.3 SEC   TSH 3RD GENERATION    Collection Time: 02/06/18  1:04 AM   Result Value Ref Range    TSH 0.175 (L) 0.358 - 3.740 uIU/mL   VITAMIN B12    Collection Time: 02/06/18  1:04 AM   Result Value Ref Range    Vitamin B12 1191 (H) 193 - 986 pg/mL   FOLATE    Collection Time: 02/06/18  1:04 AM   Result Value Ref Range    Folate 46.3 (H) 3.1 - 17.5 ng/mL   AMMONIA    Collection Time: 02/06/18  1:04 AM   Result Value Ref Range    Ammonia <10 (L) 11 - 32 UMOL/L   DRUG SCREEN, URINE    Collection Time: 02/06/18  1:04 AM   Result Value Ref Range    PCP(PHENCYCLIDINE) NEGATIVE       BENZODIAZEPINES NEGATIVE       COCAINE NEGATIVE       AMPHETAMINES NEGATIVE       METHADONE NEGATIVE       THC (TH-CANNABINOL) NEGATIVE       OPIATES NEGATIVE       BARBITURATES NEGATIVE      URINE MICROSCOPIC    Collection Time: 02/06/18  1:04 AM   Result Value Ref Range    WBC 5-10 0 /hpf    RBC 0-3 0 /hpf    Epithelial cells 0-3 0 /hpf    Bacteria 3+ (H) 0 /hpf    Casts HYALINE 0 /lpf    Crystals, urine 0 0 /LPF    Amorphous Crystals 1+ (H) 0    Mucus 0 0 /lpf    Other observations RESULTS VERIFIED MANUALLY     GLUCOSE, POC, BEDSIDE    Collection Time: 02/06/18  1:18 AM   Result Value Ref Range    Glucose (POC) 95 65 - 100 MG/DL   POC LACTIC ACID    Collection Time: 02/06/18  1:19 AM   Result Value Ref Range    Lactic Acid (POC) 0.7 0.5 - 1.9 mmol/L       Imaging /Procedures /Studies:  Ct Head Wo Cont    Result Date: 2/5/2018  IMPRESSION: No acute abnormalities. Findings most consistent with a right superior medial parietal meningioma. Recommend MRI with and without contrast for confirmation. Date of Dictation: 2/5/2018 10:27 PM     Xr Chest Port    Result Date: 2/6/2018  IMPRESSION: Normal chest.          Assessment and Plan: Active Hospital Problems    Diagnosis Date Noted    Tremors of nervous system 02/06/2018    Meningi[facility] ([facility] [address] 02/06/2018    Parkinson disease ([facility] [address] 08/15/2016    Hypertension 07/14/2016    Hyperlipidemia 07/14/2016    Atrial fibrillation ([facility] [address] 08/24/2015    Presence of cardiac pacemaker 08/24/2015       - Worsening tremor. Likely progression of Parkinson's. Seen by TeleNeuro who recommended admission. Will get MRI, EEG, LP under fluoro guidance. Continue Sinemet. - Coumadin coagulopathy. INR 6.0, tele-neuro recommending LP. Will give Vit K, follow daily INR. - Atrial fib. Rate controlled, reversing coumadin per above. - Hypertension. Stable, continue home meds. - DVT Prophylaxis: INR >6    - Code Status: FULL CODE    - Disposition: Observe on med/surg for evaluation and treatment as per above.     - Anticipated discharge: < 2 midnights     Signed By: Mani Menard MD     February 6, 2018

## 2018-02-06 NOTE — PROGRESS NOTES
Hospitalist Progress Note     Admit Date:  2018  4:38 PM   Name:  Chris Roberson   Age:  75 y.o.  :  1950   MRN:  [id number]   PCP:  Renay Burgos MD  Treatment Team: Attending Provider: Susie Grover MD; Consulting Provider: Earl Cervantes MD; Utilization Review: Alondra Godinez RN    Subjective:       Ms. Adry Sandhu is a 76 yo female with PMH of parkinsons disease followed by outpatient neuro Dr. Jesus Alberto Gutiérrez on coumadin admitted with worsening tremors. Was seen by tele neuro with differential diagosis possibility of worsening parkinsons disease/ seizure/ infectious/ or CVA. CT head showed right parietal meningioma with pending MRI. Per conversation with Dr. Chavo Cancino of neurosurgery, the meningioma is less likely the cause of her tremors and can have serial followup with outpatient neurology. IR was also consulted to consider LP. INR 6.8 managed with oral vitamin K. May need SNF pending course       18  Very rapid Shaking of UE and face, states this is typical for her      Objective:   Patient Vitals for the past 24 hrs:   Temp Pulse Resp BP SpO2   18 1345 98.8 °F (37.1 °C) 78 18 126/75 95 %   18 0936 - 68 20 108/59 97 %   18 0545 - 65 - 113/55 100 %   18 0402 - 66 - 114/56 98 %   18 0303 - 67 - 98/54 98 %   18 0230 - 66 - - 97 %   18 0145 - (!) 59 - - 97 %   18 2140 - 91 - - 99 %   18 1937 - (!) 125 18 (!) 158/100 93 %     Oxygen Therapy  O2 Sat (%): 95 % (18 1345)  Pulse via Oximetry: 68 beats per minute (18)  O2 Device: Room air (18)  No intake or output data in the 24 hours ending 18 1609      General:    Appears elderly, shaking of head and UE, alert   CV:   Rate controlled and irregular. No murmur, rub, or gallop. Lungs:   CTAB. No wheezing, rhonchi, or rales. Abdomen:   Soft, nontender, nondistended. Extremities: Warm and dry. No cyanosis or edema.    Skin:     No rashes or jaundice. Neuro:  Alert, shaking of head and UE     Data Review:  I have reviewed all labs, meds, telemetry events, and studies from the last 24 hours.     Recent Results (from the past 24 hour(s))   PROTHROMBIN TIME + INR    Collection Time: 02/06/18  1:04 AM   Result Value Ref Range    Prothrombin time 54.0 (H) 11.5 - 14.5 sec    INR 6.0 (HH)     PTT    Collection Time: 02/06/18  1:04 AM   Result Value Ref Range    aPTT 49.3 (H) 23.2 - 35.3 SEC   SED RATE, AUTOMATED    Collection Time: 02/06/18  1:04 AM   Result Value Ref Range    Sed rate, automated 6 0 - 30 mm/hr   TSH 3RD GENERATION    Collection Time: 02/06/18  1:04 AM   Result Value Ref Range    TSH 0.175 (L) 0.358 - 3.740 uIU/mL   VITAMIN B12    Collection Time: 02/06/18  1:04 AM   Result Value Ref Range    Vitamin B12 1191 (H) 193 - 986 pg/mL   FOLATE    Collection Time: 02/06/18  1:04 AM   Result Value Ref Range    Folate 46.3 (H) 3.1 - 17.5 ng/mL   AMMONIA    Collection Time: 02/06/18  1:04 AM   Result Value Ref Range    Ammonia <10 (L) 11 - 32 UMOL/L   DRUG SCREEN, URINE    Collection Time: 02/06/18  1:04 AM   Result Value Ref Range    PCP(PHENCYCLIDINE) NEGATIVE       BENZODIAZEPINES NEGATIVE       COCAINE NEGATIVE       AMPHETAMINES NEGATIVE       METHADONE NEGATIVE       THC (TH-CANNABINOL) NEGATIVE       OPIATES NEGATIVE       BARBITURATES NEGATIVE      URINE MICROSCOPIC    Collection Time: 02/06/18  1:04 AM   Result Value Ref Range    WBC 5-10 0 /hpf    RBC 0-3 0 /hpf    Epithelial cells 0-3 0 /hpf    Bacteria 3+ (H) 0 /hpf    Casts HYALINE 0 /lpf    Crystals, urine 0 0 /LPF    Amorphous Crystals 1+ (H) 0    Mucus 0 0 /lpf    Other observations RESULTS VERIFIED MANUALLY     GLUCOSE, POC, BEDSIDE    Collection Time: 02/06/18  1:18 AM   Result Value Ref Range    Glucose (POC) 95 65 - 100 MG/DL   POC LACTIC ACID    Collection Time: 02/06/18  1:19 AM   Result Value Ref Range    Lactic Acid (POC) 0.7 0.5 - 1.9 mmol/L   METABOLIC PANEL, BASIC Collection Time: 02/06/18  3:15 AM   Result Value Ref Range    Sodium 143 136 - 145 mmol/L    Potassium 3.8 3.5 - 5.1 mmol/L    Chloride 108 (H) 98 - 107 mmol/L    CO2 26 21 - 32 mmol/L    Anion gap 9 7 - 16 mmol/L    Glucose 84 65 - 100 mg/dL    BUN 24 (H) 8 - 23 MG/DL    Creatinine 0.76 0.6 - 1.0 MG/DL    GFR est AA >60 >60 ml/min/1.73m2    GFR est non-AA >60 >60 ml/min/1.73m2    Calcium 8.4 8.3 - 10.4 MG/DL   CBC WITH AUTOMATED DIFF    Collection Time: 02/06/18  3:15 AM   Result Value Ref Range    WBC 7.3 4.3 - 11.1 K/uL    RBC 3.98 (L) 4.05 - 5.25 M/uL    HGB 11.7 11.7 - 15.4 g/dL    HCT 35.0 (L) 35.8 - 46.3 %    MCV 87.9 79.6 - 97.8 FL    MCH 29.4 26.1 - 32.9 PG    MCHC 33.4 31.4 - 35.0 g/dL    RDW 13.4 11.9 - 14.6 %    PLATELET 451 009 - 323 K/uL    MPV 9.3 (L) 10.8 - 14.1 FL    DF AUTOMATED      NEUTROPHILS 67 43 - 78 %    LYMPHOCYTES 23 13 - 44 %    MONOCYTES 10 4.0 - 12.0 %    EOSINOPHILS 0 (L) 0.5 - 7.8 %    BASOPHILS 0 0.0 - 2.0 %    IMMATURE GRANULOCYTES 0 0.0 - 5.0 %    ABS. NEUTROPHILS 4.9 1.7 - 8.2 K/UL    ABS. LYMPHOCYTES 1.7 0.5 - 4.6 K/UL    ABS. MONOCYTES 0.7 0.1 - 1.3 K/UL    ABS. EOSINOPHILS 0.0 0.0 - 0.8 K/UL    ABS. BASOPHILS 0.0 0.0 - 0.2 K/UL    ABS. IMM.  GRANS. 0.0 0.0 - 0.5 K/UL   PROTHROMBIN TIME + INR    Collection Time: 02/06/18  4:03 AM   Result Value Ref Range    Prothrombin time 59.5 (H) 11.5 - 14.5 sec    INR 6.8 (HH)     T4, FREE    Collection Time: 02/06/18 11:33 AM   Result Value Ref Range    T4, Free 1.4 0.78 - 1.46 NG/DL   T3 TOTAL    Collection Time: 02/06/18 12:16 PM   Result Value Ref Range    T3, total 0.72 0.60 - 1.81 ng/mL        All Micro Results     Procedure Component Value Units Date/Time    CULTURE, URINE [[id number]]     Order Status:  Sent Specimen:  Urine from Clean catch     CULTURE, BLOOD [[id number]] Collected:  02/06/18 0105    Order Status:  Completed Specimen:  Blood from Blood Updated:  02/06/18 0338    CULTURE, BLOOD [[id number]] Collected:  02/06/18 0104 Order Status:  Completed Specimen:  Blood from Blood Updated:  02/06/18 0338          Current Meds:  Current Facility-Administered Medications   Medication Dose Route Frequency    0.9% sodium chloride infusion  100 mL/hr IntraVENous CONTINUOUS    potassium chloride (K-DUR, KLOR-CON) SR tablet 20 mEq  20 mEq Oral DAILY    simvastatin (ZOCOR) tablet 20 mg  20 mg Oral QHS    conjugated estrogens (PREMARIN) tablet 0.625 mg  0.625 mg Oral DAILY    carbidopa-levodopa ER (SINEMET CR)  mg per tablet 2 Tab  2 Tab Oral QHS    carbidopa-levodopa (SINEMET)  mg per tablet 1 Tab  1 Tab Oral QID    sodium chloride (NS) flush 5-10 mL  5-10 mL IntraVENous Q8H    sodium chloride (NS) flush 5-10 mL  5-10 mL IntraVENous PRN    acetaminophen (TYLENOL) tablet 650 mg  650 mg Oral Q4H PRN    ondansetron (ZOFRAN) injection 4 mg  4 mg IntraVENous Q4H PRN    magnesium hydroxide (MILK OF MAGNESIA) 400 mg/5 mL oral suspension 30 mL  30 mL Oral DAILY PRN    metoprolol succinate (TOPROL-XL) XL tablet 25 mg  25 mg Oral Q12H       Other Studies (last 24 hours):  Ct Head Wo Cont    Result Date: 2/6/2018  CT HEAD WITHOUT CONTRAST HISTORY:  Tremors. . COMPARISON: None. TECHNIQUE: Axial imaging was performed without intravenous contrast utilizing 5mm slice thickness. Sagittal and coronal reformats were performed. Radiation dose reduction techniques were used for this study. Our CT scanner uses one or all of the following: Automated exposure control, adjustment of the MAS or KUB according to patient's size and iterative reconstruction. FINDINGS:    *BRAIN:    -  There are no early signs of territorial or lacunar infarction by CT.    -  Round well-defined hyperdense mass in the right superior parietal parafalcine region measuring 1.7 x 1.4 cm.    -  For patient's age, the scattered areas of white matter hypodensities may represent a chronic small vessel white matter ischemia. However this is nonspecific.  *VISUALIZED PARANASAL SINUSES: Well aerated. *MASTOIDS:  Clear. *CALVARIUM AND SCALP: Unremarkable. IMPRESSION: No acute abnormalities. Findings most consistent with a right superior medial parietal meningioma. Recommend MRI with and without contrast for confirmation. Date of Dictation: 2/5/2018 10:27 PM     Xr Chest Port    Result Date: 2/6/2018  EXAM:  XR CHEST PORT INDICATION:  Transient alteration of awareness COMPARISON:  None. FINDINGS: A portable AP radiograph of the chest was obtained at 0102 hours. Normal pacemaker lead position. .  The lungs are clear. The cardiac and mediastinal contours and pulmonary vascularity are normal.  The bones and soft tissues are grossly within normal limits.      IMPRESSION: Normal chest.       Assessment and Plan:     Hospital Problems as of 2/6/2018  Date Reviewed: 11/6/2017          Codes Class Noted - Resolved POA    Tremors of nervous system ICD-10-CM: R25.1  ICD-9-CM: 781.0  2/6/2018 - Present Yes        Meningioma ([facility] [address] ICD-10-CM: D32.9  ICD-9-CM: 225.2  2/6/2018 - Present Yes        Parkinson disease ([facility] [address] ICD-10-CM: Javiern Mantis  ICD-9-CM: 332.0  8/15/2016 - Present Yes        Hypertension ICD-10-CM: I10  ICD-9-CM: 401.9  7/14/2016 - Present Yes        Hyperlipidemia ICD-10-CM: E78.5  ICD-9-CM: 272.4  7/14/2016 - Present Yes        Atrial fibrillation ([facility] [address] ICD-10-CM: I48.91  ICD-9-CM: 427.31  8/24/2015 - Present Yes        Presence of cardiac pacemaker ICD-10-CM: Z95.0  ICD-9-CM: V45.01  8/24/2015 - Present Yes              PLAN:    · Will have bedside neurology evaluation  · Continue sinemet  · Appreciate Dr. David Hernandez input and will communicate at discharge to patients established neurologist for serial meningioma followup needs   ·  proceed with MRI brain as possible  · EEG and LP requested   · Will need subtherapeutic INR for further procedures, will repeat INR tomorrow and has received oral vitamin k   · recheck UA/ cx   · TSH suppressed with higher range of normal free T4/normal total T3- will need recheck in 6-8 weeks   · PPD, PT/OT and case management for dispo    DC planning/Dispo:    DVT ppx:  Supratherapeutic INR    Signed:  Vinod Crespo MD

## 2018-02-06 NOTE — PROGRESS NOTES
Problem: Dysphagia (Adult)  Goal: *Acute Goals and Plan of Care (Insert Text)  OBSERVATION Speech language pathology: bedside swallow note: Initial Assessment and Discharge    NAME/AGE/GENDER: Jocelyn Lopez is a 75 y.o. female  DATE: 2/6/2018  PRIMARY DIAGNOSIS: Tremors of nervous system       ICD-10: Treatment Diagnosis: dysphagia, other 13.19  INTERDISCIPLINARY COLLABORATION: Registered Nurse  PRECAUTIONS/ALLERGIES: Codeine; Penicillins; and Sulfa (sulfonamide antibiotics)  ASSESSMENT:Based on the objective data described below, Ms. Patrick swallowing is essentially [facility]. Pt reported being dx with Parkinson's disease approximately 3 years ago. However, chart review revealed it was in 2011. Pt given trials thin liquids, pudding, mixed consistency and solids. SLP presented some consistencies due to significant tremors. Mastication WFL. No signs/sx aspiration observed. Recommend continuing with a regular diet. May need assistance with feeding at times depending upon tremors. No further ST indicated. Discussed with pt and RN whom are in agreement. PLAN OF CARE:   Patient will benefit from skilled intervention to address the following impairments. RECOMMENDATIONS AND PLANNED INTERVENTIONS (Benefits and precautions of therapy have been discussed with the patient.):  · continue prescribed diet  MEDICATIONS:  · With liquid  COMPENSATORY STRATEGIES/MODIFICATIONS INCLUDING:  · None  OTHER RECOMMENDATIONS (including follow up treatment recommendations):   · NA  RECOMMENDED DIET MODIFICATIONS DISCUSSED WITH:  · Nursing  · Patient RECOMMENDED REHABILITATION/EQUIPMENT: (at time of discharge pending progress): Due to the probability of continued deficits (see above) this patient will not likely need continued skilled speech therapy after discharge. SUBJECTIVE:   Pt cooperative. History of Present Injury/Illness: Ms. Aston Ballard  has a past medical history of Arrhythmia (7/14/2016);  Atrial fibrillation ([facility] [address] (8/24/2015); Dry eye; Heart disease (7/14/2016); High blood pressure (7/14/2016); History of basal cell cancer (7/14/2016); Hyperlipidemia (7/14/2016); Hypertension (7/14/2016); Presence of cardiac pacemaker (8/24/2015); and Tremor (7/14/2016). .   She also  has a past surgical history that includes hx other surgical; hx other surgical; hx salpingo-oophorectomy; hx colonoscopy (12/2011); hx other surgical (1982); hx tonsillectomy; hx heart catheterization; hx other surgical (6/12); and hx partial hysterectomy. Present Symptoms: hx of Parkinson's disease   Pain Intensity 1: 0  Current Medications:   No current facility-administered medications on file prior to encounter. Current Outpatient Prescriptions on File Prior to Encounter   Medication Sig Dispense Refill    carbidopa-levodopa (SINEMET-25/250)  mg per tablet Take 1 Tab by mouth four (4) times daily. 360 Tab 3    carbidopa-levodopa CR (SINEMET CR)  mg per tablet 1 tab Qhs 90 Tab 3    omega-3 fatty acids-vitamin e (FISH OIL) 1,000 mg cap Take 1 Cap by mouth.  POTASSIUM CHLORIDE (KLOR-CON M20 PO) Take  by mouth daily.  conjugated estrogens (PREMARIN) 0.625 mg tablet Take  by mouth daily.  lovastatin (MEVACOR) 40 mg tablet Take 40 mg by mouth nightly.  metoprolol succinate (TOPROL-XL) 25 mg XL tablet Take  by mouth two (2) times a day. Current Dietary Status:  Regular       History of reflux:    Reflux medication:  Social History/Home Situation: residing with spouse and son     OBJECTIVE:   Respiratory Status:  Room air     CXR Results: normal chest  MRI/CT Results: No acute abnormalities.     Findings most consistent with a right superior medial parietal meningioma. Recommend MRI with and without contrast for confirmation.   Oral Motor Structure/Speech:  Oral-Motor Structure/Motor Speech  Labial: No impairment  Dentition: Intact, Natural  Oral Hygiene: adequate  Lingual: No impairment    Cognitive and Communication Status:  Neurologic State: Alert  Orientation Level: Disoriented to place; Disoriented to situation;Disoriented to time;Oriented to person (stated April 2018; knew hospital but not name)  Cognition: Follows commands;Decreased attention/concentration             BEDSIDE SWALLOW EVALUATION  Oral Assessment:  Oral Assessment  Labial: No impairment  Dentition: Intact; Natural  Oral Hygiene: adequate  Lingual: No impairment  P.O. Trials:  Patient Position: upright in bed    The patient was given teaspoon to straw amounts of the following:   Consistency Presented: Mixed consistency; Solid; Thin liquid  How Presented: Self-fed/presented;SLP-fed/presented;Cup/sip;Spoon;Straw;Successive swallows    ORAL PHASE:  Bolus Acceptance: No impairment  Bolus Formation/Control: No impairment  Propulsion: No impairment     Oral Residue: None    PHARYNGEAL PHASE:  Initiation of Swallow: No impairment  Laryngeal Elevation: Functional  Aspiration Signs/Symptoms: None  Vocal Quality: Low volume           Pharyngeal Phase Characteristics: No impairment, issues, or problems     OTHER OBSERVATIONS:  Rate/bite size: WNL   Endurance: WNL   Comments:        Tool Used: Dysphagia Outcome and Severity Scale (MADELIN)    Score Comments   Normal Diet  [] 7 With no strategies or extra time needed   Functional Swallow  [] 6 May have mild oral or pharyngeal delay       Mild Dysphagia    [] 5 Which may require one diet consistency restricted (those who demonstrate penetration which is entirely cleared on MBS would be included)   Mild-Moderate Dysphagia  [] 4 With 1-2 diet consistencies restricted       Moderate Dysphagia  [] 3 With 2 or more diet consistencies restricted       Moderately Severe Dysphagia  [] 2 With partial PO strategies (trials with ST only)       Severe Dysphagia  [] 1 With inability to tolerate any PO safely          Score:  Initial: 7 Most Recent: X (Date: -- )   Interpretation of Tool: The Dysphagia Outcome and Severity Scale (MADELIN) is a simple, easy-to-use, 7-point scale developed to systematically rate the functional severity of dysphagia based on objective assessment and make recommendations for diet level, independence level, and type of nutrition. Score 7 6 5 4 3 2 1   Modifier CH CI CJ CK CL CM CN   ?  Swallowing:     - CURRENT STATUS: CH - 0% impaired, limited or restricted    - GOAL STATUS:  CH - 0% impaired, limited or restricted    - D/C STATUS:  CH - 0% impaired, limited or restricted  Payor: [address] [place] [address] / Plan: [id number] / Product Type: PPO /     TREATMENT:    (In addition to Assessment/Re-Assessment sessions the following treatments were rendered)  Assessment/Reassessment only, no treatment provided today  MODALITIES:                                                                    ORAL MOTOR  EXERCISES:                                                                                                                                                                      LARYNGEAL / PHARYNGEAL EXERCISES:                                                                                                                                     __________________________________________________________________________________________________  Safety:   After treatment position/precautions:  · Upright in Bed    Total Treatment Duration:  Time In: 1148  Time Out: [address], MSP, CCC-SLP

## 2018-02-06 NOTE — PROGRESS NOTES
Speech Pathology  Bedside swallowing evaluation completed. Recommend continuing with a regular diet. Full report to follow. Thank you.     [address] CCC-SLP

## 2018-02-06 NOTE — PROGRESS NOTES
Please complete mri screening form in the computer with signatures. Patient has a MRI safe pacemaker. Will need patients card for pacemaker and MRI will have to set up scan with [employer] rep today.  Will notify floor of time once we get in touch with Satomi

## 2018-02-06 NOTE — ED NOTES
Tele neuro live @ bs with pt, son, and RN. Assessment done, to to be admitted, additional orders to be placed. Pt tolerated neuro exam fairly well, falling asleep between questions.

## 2018-02-06 NOTE — PROGRESS NOTES
Pt arrived to floor via stretcher. Pt alert and oriented x4 currently. Dual skin assessment completed with Maggy Keane. Skin c/d/i. Pt tremoring at the moment.

## 2018-02-07 LAB
ANION GAP SERPL CALC-SCNC: 7 MMOL/L (ref 7–16)
BASOPHILS # BLD: 0 K/UL (ref 0–0.2)
BASOPHILS NFR BLD: 0 % (ref 0–2)
BUN SERPL-MCNC: 14 MG/DL (ref 8–23)
CALCIUM SERPL-MCNC: 8.3 MG/DL (ref 8.3–10.4)
CHLORIDE SERPL-SCNC: 109 MMOL/L (ref 98–107)
CO2 SERPL-SCNC: 26 MMOL/L (ref 21–32)
CREAT SERPL-MCNC: 0.58 MG/DL (ref 0.6–1)
DIFFERENTIAL METHOD BLD: ABNORMAL
EOSINOPHIL # BLD: 0 K/UL (ref 0–0.8)
EOSINOPHIL NFR BLD: 1 % (ref 0.5–7.8)
ERYTHROCYTE [DISTWIDTH] IN BLOOD BY AUTOMATED COUNT: 13.5 % (ref 11.9–14.6)
GLUCOSE SERPL-MCNC: 90 MG/DL (ref 65–100)
HCT VFR BLD AUTO: 37.4 % (ref 35.8–46.3)
HGB BLD-MCNC: 12.4 G/DL (ref 11.7–15.4)
IMM GRANULOCYTES # BLD: 0 K/UL (ref 0–0.5)
IMM GRANULOCYTES NFR BLD AUTO: 0 % (ref 0–5)
INR PPP: 1.5
LYMPHOCYTES # BLD: 1.3 K/UL (ref 0.5–4.6)
LYMPHOCYTES NFR BLD: 27 % (ref 13–44)
MCH RBC QN AUTO: 29.4 PG (ref 26.1–32.9)
MCHC RBC AUTO-ENTMCNC: 33.2 G/DL (ref 31.4–35)
MCV RBC AUTO: 88.6 FL (ref 79.6–97.8)
MM INDURATION POC: NORMAL MM (ref 0–5)
MONOCYTES # BLD: 0.5 K/UL (ref 0.1–1.3)
MONOCYTES NFR BLD: 11 % (ref 4–12)
NEUTS SEG # BLD: 3 K/UL (ref 1.7–8.2)
NEUTS SEG NFR BLD: 61 % (ref 43–78)
PLATELET # BLD AUTO: 201 K/UL (ref 150–450)
PMV BLD AUTO: 9.5 FL (ref 10.8–14.1)
POTASSIUM SERPL-SCNC: 3.8 MMOL/L (ref 3.5–5.1)
PPD POC: NORMAL NEGATIVE
PROTHROMBIN TIME: 17.3 SEC (ref 11.5–14.5)
RBC # BLD AUTO: 4.22 M/UL (ref 4.05–5.25)
SODIUM SERPL-SCNC: 142 MMOL/L (ref 136–145)
WBC # BLD AUTO: 4.9 K/UL (ref 4.3–11.1)

## 2018-02-07 PROCEDURE — 85025 COMPLETE CBC W/AUTO DIFF WBC: CPT | Performed by: FAMILY MEDICINE

## 2018-02-07 PROCEDURE — 80048 BASIC METABOLIC PNL TOTAL CA: CPT | Performed by: FAMILY MEDICINE

## 2018-02-07 PROCEDURE — 99218 HC RM OBSERVATION: CPT

## 2018-02-07 PROCEDURE — 74011250636 HC RX REV CODE- 250/636

## 2018-02-07 PROCEDURE — G8978 MOBILITY CURRENT STATUS: HCPCS

## 2018-02-07 PROCEDURE — G8979 MOBILITY GOAL STATUS: HCPCS

## 2018-02-07 PROCEDURE — G8988 SELF CARE GOAL STATUS: HCPCS

## 2018-02-07 PROCEDURE — 97535 SELF CARE MNGMENT TRAINING: CPT

## 2018-02-07 PROCEDURE — 85610 PROTHROMBIN TIME: CPT | Performed by: FAMILY MEDICINE

## 2018-02-07 PROCEDURE — 97166 OT EVAL MOD COMPLEX 45 MIN: CPT

## 2018-02-07 PROCEDURE — 86038 ANTINUCLEAR ANTIBODIES: CPT | Performed by: FAMILY MEDICINE

## 2018-02-07 PROCEDURE — G8987 SELF CARE CURRENT STATUS: HCPCS

## 2018-02-07 PROCEDURE — 97530 THERAPEUTIC ACTIVITIES: CPT

## 2018-02-07 PROCEDURE — 97161 PT EVAL LOW COMPLEX 20 MIN: CPT

## 2018-02-07 PROCEDURE — 36415 COLL VENOUS BLD VENIPUNCTURE: CPT | Performed by: FAMILY MEDICINE

## 2018-02-07 PROCEDURE — G8980 MOBILITY D/C STATUS: HCPCS

## 2018-02-07 PROCEDURE — 74011250637 HC RX REV CODE- 250/637: Performed by: FAMILY MEDICINE

## 2018-02-07 RX ADMIN — METOPROLOL SUCCINATE 25 MG: 25 TABLET, EXTENDED RELEASE ORAL at 08:51

## 2018-02-07 RX ADMIN — CARBIDOPA AND LEVODOPA 1 TABLET: 25; 250 TABLET ORAL at 21:16

## 2018-02-07 RX ADMIN — Medication 10 ML: at 22:00

## 2018-02-07 RX ADMIN — ESTROGENS, CONJUGATED 0.62 MG: 0.62 TABLET, FILM COATED ORAL at 08:51

## 2018-02-07 RX ADMIN — CARBIDOPA AND LEVODOPA 2 TABLET: 25; 100 TABLET, EXTENDED RELEASE ORAL at 21:15

## 2018-02-07 RX ADMIN — SODIUM CHLORIDE 100 ML/HR: 900 INJECTION, SOLUTION INTRAVENOUS at 08:49

## 2018-02-07 RX ADMIN — METOPROLOL SUCCINATE 25 MG: 25 TABLET, EXTENDED RELEASE ORAL at 21:15

## 2018-02-07 RX ADMIN — SIMVASTATIN 20 MG: 20 TABLET, FILM COATED ORAL at 21:15

## 2018-02-07 RX ADMIN — Medication 10 ML: at 13:38

## 2018-02-07 RX ADMIN — POTASSIUM CHLORIDE 20 MEQ: 20 TABLET, EXTENDED RELEASE ORAL at 08:51

## 2018-02-07 RX ADMIN — CARBIDOPA AND LEVODOPA 1 TABLET: 25; 250 TABLET ORAL at 06:03

## 2018-02-07 RX ADMIN — CARBIDOPA AND LEVODOPA 1 TABLET: 25; 250 TABLET ORAL at 17:18

## 2018-02-07 RX ADMIN — Medication 10 ML: at 06:03

## 2018-02-07 RX ADMIN — CARBIDOPA AND LEVODOPA 1 TABLET: 25; 250 TABLET ORAL at 13:38

## 2018-02-07 NOTE — PROGRESS NOTES
Care Management Interventions  PCP Verified by CM: Yes  Transition of Care Consult (CM Consult): Home Health, Discharge Planning  Current Support Network: Lives with Spouse, Other (adult son is in the home )  Confirm Follow Up Transport: Family  Plan discussed with Pt/Family/Caregiver: Yes  Freedom of Choice Offered: Yes  Discharge Location  Discharge Placement: Home     Met with patient and son. Patient  Is alert but confused with who I am. Asked a few times if I worked in pharmaceuticals. Lives with her spouse and an adult son who is not currently working. She has obvious tremors lying in the bed. Reports she does not normally use a walker or cane and son agreed. See Neuro note. [facility] services and we will refer to Wilfredo because her spouse is current with that agency right now. Plan: Home with family  Treating UTI and adjusting meds. Wilfredo CANTU RN and PT   [employer].  Karyn Perea

## 2018-02-07 NOTE — PROGRESS NOTES
CT head images and chart reviewed, D/W'd Dr. Mel Silva. Small asymptomatic parasagittal meningioma will require no surgery but will require routine long term F/U. This will be arranged with Dr. Margaretann Schlatter at the time of discharge. Thank you!

## 2018-02-07 NOTE — PROCEDURES
[address]  EEG    Gal Counter  MR#: [id number]  : 1950  ACCOUNT #: [de-identified]   DATE OF SERVICE: 2018    REFERRING PHYSICIAN:  Dr. Sumaya Jackson. CLINICAL PROBLEM/REASON FOR EEG:  Tremors. HISTORY:  The patient with tremors and shaking intermittently that worsen into the emergency room, the patient was very somnolent at the time of her examination in the emergency room and patient is noted to be on chronic Coumadin for atrial fibrillation. Her ER record states Sinemet was prescribed and was concerned that these symptoms may have been caused by eating a lot of honey 2 days prior to admission to the emergency room. MEDICATIONS:  Sinemet given this morning 3 hours prior to the EEG. Other medications had not been given in the emergency room. Zofran is p.r.n. Toprol q.12 hours. Zocor at bedtime. PAST MEDICAL HISTORY:  Parkinson disease, tremors of the nervous system, meningioma, pacemaker, atrial fibrillation, basal cell carcinoma, REM sleep behavior disorder, and hypertension. Hyperventilation is not performed. Photic stimulation is performed with driving effect noted. TECHNOLOGY NOTE:  Patient was extremely lethargic when awake. Also, the only time that she shakes is during wakening. No shaking or movement abnormalities were noted during sleep. Several events of shaking were noted, mostly patient is responsive. Minimal mouth movements. Subtle head movements. DESCRIPTION:  This is a 21-channel EEG starting at 08:26:50 hours and stopping at 08:56:57 hours on 2018. Background activity is at most an 8 Hz, 50 microvolt alpha rhythm posteriorly placed. However, throughout the record, a 6.5 Hz background predominating theta activity continues. In fact, this is the predominant rhythm, it is general and diffuse and widespread. Sometimes there is 5.5 Hz theta rhythm.   No specific epileptogenic sharp and focus or epileptiform discharges notable. Fast activity occurs but there is marked degree of muscle artifact. There is extreme noise throughout the record that appears to have some effect on artifactual occurrences. Audiovisual was fully reviewed and noted. The 10-20 international electrode placement with bipolar and referential recordings and 1-channel of EKG and 20 channels of EEG recorded. General disorganization of a mild to moderate amount is present in both hemispheres and diffuse. This continues throughout the record. There is wakeful recording as well as drowsing and sleep recording. During some of the events of shaking, a marked amount of artifact from muscle can be seen. However, no definitive epileptogenic focus is noted. No definitive electrographic seizure occurs. There are no time locked events. The shaking is very fast approximating 10 Hz per second, mainly of the upper extremities. INTERPRETATION:  This is an abnormal EEG because of generalized slowing into the theta rhythm compatible with a moderately abnormal EEG because of a metabolic toxic encephalopathy that could be from drug effect or other factors including many such as electrolyte disturbance, etc.  Noting that there is a previous history of parkinsonism, certainly a background problematic encephalopathy may be present from such. As far as seizure, no specific seizure was observed and the events as noted were nonepileptic events. However, that said, certainly there was an excessive amount of background artifactual activity that detracts from the full interpretation of such an EEG. CONCLUSION/RECOMMENDATION:  Evaluation further for electrophysiologic abnormalities that would cause encephalopathy is warranted. As well, a repeat tracing in EEG lab where it was much more controlled and perhaps even for outpatient sleep deprived EEG for correlation.       MD OK Spence / TRISH  D: 02/06/2018 21:01     T: 02/06/2018 23:27  JOB #: [id number]

## 2018-02-07 NOTE — PROGRESS NOTES
Called to find out the status of patient for the MRI, Per RN- pt still has tremors. So MRI is still on hold until further notice.

## 2018-02-07 NOTE — CONSULTS
Impression    Tremor predominant Parkinson's disease. Additional and anticipated treatment options such as Rytary (a brand name extended release carbidopa levodopa formulation which requires prior authorization and is not on the hospital formulary) and further discussion and/or consideration of deep brain stimulation are not relevant to care in the inpatient setting. There is some concern regarding the patient's adherence to her current treatment plan. Home health nurse evaluation following discharge may be helpful in this regard. Possible chronic urinary tract infection. This may be exacerbating Parkinson's symptoms. The patient indicates he's had a recent course of antibiotics, but continues to have pyuria. Infection may exacerbate her Parkinson symptoms. Recommendation:    Sinemet (carbidopa- levodopa) 25/250 Immediate release 1 by mouth every 4 hours when awake . (4 doses daily)    Sinemet CR ( Carbidopa-levodopa continuous release) 50/200 daily at bedtime    Further evaluation and treatment of chronic pyuria/urinary tract infection at discretion of the admitting physicians    The patient will be contacted regarding outpatient follow-up appointment for next week    Signing off    Chief complaint: Tremor    History    Patient is a 48-year-old female with a history of idiopathic Parkinson's disease for about 2 years. The patient is currently under the care of Wilfrido Avila .  I have reviewed her recent progress notes and spoken to her by phone. Patient has good control of rigidity and bradykinesia but severe tremor. A trial of trihexyphenidyl was attempted but was not well tolerated. It is unclear if the patient took this medication as intended. In any event, she has most recently since her January 15 appointment been maintained on an increased dose of Sinemet and Sinemet CR at bedtime as outlined and recommendations.       Patient indicates that she underwent treatment for urinary tract infection for 2 week  About a month ago. She currently denies any dysuria, foul smelling urine or pyuria    Past Medical History:   Diagnosis Date    Arrhythmia 7/14/2016    Atrial fibrillation ([address] 8/24/2015    Dry eye     Heart disease 7/14/2016    High blood pressure 7/14/2016    History of basal cell cancer 7/14/2016    Hyperlipidemia 7/14/2016    Hypertension 7/14/2016    Presence of cardiac pacemaker 8/24/2015    Tremor 7/14/2016     Past Surgical History:   Procedure Laterality Date    HX COLONOSCOPY  12/2011    HX HEART CATHETERIZATION      HX OTHER SURGICAL      Cardiac Electrophysiology Mapping and Ablation    HX OTHER SURGICAL      Lung Biopsy    HX OTHER SURGICAL  1982    skin cancer excision, basal cell carcinoma on forehead    HX OTHER SURGICAL  6/12    Cardiac pacemaker placement    HX PARTIAL HYSTERECTOMY      Abdominal     HX SALPINGO-OOPHORECTOMY      Laparoscopic, with MARIA ELENA    HX TONSILLECTOMY       Social History     Social History    Marital status:      Spouse name: N/A    Number of children: N/A    Years of education: N/A     Occupational History    Not on file.      Social History Main Topics    Smoking status: Never Smoker    Smokeless tobacco: Never Used    Alcohol use No    Drug use: No    Sexual activity: Not on file     Other Topics Concern    Not on file     Social History Narrative     Family History   Problem Relation Age of Onset    Heart Disease Mother     Thyroid Disease Mother     Heart Disease Father     Cancer Father      Lung Cancer     Review of Systems - Negative except noted in history of present illness      Current Facility-Administered Medications:     0.9% sodium chloride infusion, 100 mL/hr, IntraVENous, CONTINUOUS, Nabor Galeas MD, Last Rate: 100 mL/hr at 02/07/18 0849, 100 mL/hr at 02/07/18 0849    potassium chloride (K-DUR, KLOR-CON) SR tablet 20 mEq, 20 mEq, Oral, DAILY, Clarissa Nam MD, 20 mEq at 02/07/18 7405    simvastatin (ZOCOR) tablet 20 mg, 20 mg, Oral, QHS, Gina Perez MD, 20 mg at 02/06/18 2226    conjugated estrogens (PREMARIN) tablet 0.625 mg, 0.625 mg, Oral, DAILY, Gina Perez MD, 0.625 mg at 02/07/18 0851    carbidopa-levodopa ER (SINEMET CR)  mg per tablet 2 Tab, 2 Tab, Oral, QHS, Gina Perez MD, 2 Tab at 02/06/18 2226    carbidopa-levodopa (SINEMET)  mg per tablet 1 Tab, 1 Tab, Oral, QID, Gina Perez MD, 1 Tab at 02/07/18 0603    sodium chloride (NS) flush 5-10 mL, 5-10 mL, IntraVENous, Q8H, Gina Perez MD, 10 mL at 02/07/18 0603    sodium chloride (NS) flush 5-10 mL, 5-10 mL, IntraVENous, PRN, Gina Perez MD    acetaminophen (TYLENOL) tablet 650 mg, 650 mg, Oral, Q4H PRN, Gina Perez MD    ondansetron [facility]) injection 4 mg, 4 mg, IntraVENous, Q4H PRN, Gina Perez MD    magnesium hydroxide (MILK OF MAGNESIA) 400 mg/5 mL oral suspension 30 mL, 30 mL, Oral, DAILY PRN, Gina Perez MD    metoprolol succinate (TOPROL-XL) XL tablet 25 mg, 25 mg, Oral, Q12H, Gina Perez MD, 25 mg at 02/07/18 2840    tuberculin injection 5 Units, 5 Units, IntraDERMal, ONCE, Delma Almeida MD, 5 Units at 02/06/18 1700    Allergies   Allergen Reactions    Codeine Unknown (comments)    Penicillins Unknown (comments)    Sulfa (Sulfonamide Antibiotics) Unknown (comments)     Visit Vitals    /78 (BP 1 Location: Left arm, BP Patient Position: At rest)    Pulse 70    Temp 98.1 °F (36.7 °C)    Resp 16    Ht 5' 4\" (1.626 m)    Wt 47.6 kg (105 lb)    SpO2 96%    BMI 18.02 kg/m2     General: well nourished, appears stated age    Eyes: no proptosis or exophthalmos; conjunctivae clear, sclerae non-icteric    Chest: clear to auscultation    Cardiac: normal S1 S2; no murmurs gallop or rubs    Neurological:    MSE: alert, oriented times 3; fluent speech; no paraphasic errors; follows commands without difficulty    CN 2: visual fields full; no afferent pupillary defect; VA not checked; fundoscopic exam ... CN 3,4,6: Pupils symmetrical in size, reactive to light directly and consensually; no ptosis; full versions and ductions  CN 5: facial sensation intact to light touch and pin prick. Corneal reflex . ..   CN 7: Symmetrical facial tone and movements  CN 8 responds to spoken voice  CN 9,10; palate symmetrical gag intact  CN 11: head turn and shoulder shrug intact  CN 12: tongue midline without atrophy or fasiculations    Motor:  Power 5/5 UE and LE proximal to distal  Fine motor movements symmetrical  Low frequency high amplitude rest tremor noted in both upper extremities consistent with Parkinson's disease  Tone normal; no cogwheeling or rigidity  Atrophy: absent  Gait: not examined    Cerebellar:  Finger to nose; heel to shin intact      Sensory  Romberg negative  Intact to primary modalities in all 4 extremities    Reflexes    Symmetrical and normally active at 2+ in UE and LE  Plantar response flexor bilaterally    Recent Results (from the past 24 hour(s))   T4, FREE    Collection Time: 02/06/18 11:33 AM   Result Value Ref Range    T4, Free 1.4 0.78 - 1.46 NG/DL   T3 TOTAL    Collection Time: 02/06/18 12:16 PM   Result Value Ref Range    T3, total 0.72 0.60 - 9.78 ng/mL   METABOLIC PANEL, BASIC    Collection Time: 02/07/18  4:52 AM   Result Value Ref Range    Sodium 142 136 - 145 mmol/L    Potassium 3.8 3.5 - 5.1 mmol/L    Chloride 109 (H) 98 - 107 mmol/L    CO2 26 21 - 32 mmol/L    Anion gap 7 7 - 16 mmol/L    Glucose 90 65 - 100 mg/dL    BUN 14 8 - 23 MG/DL    Creatinine 0.58 (L) 0.6 - 1.0 MG/DL    GFR est AA >60 >60 ml/min/1.73m2    GFR est non-AA >60 >60 ml/min/1.73m2    Calcium 8.3 8.3 - 10.4 MG/DL   CBC WITH AUTOMATED DIFF    Collection Time: 02/07/18  4:52 AM   Result Value Ref Range    WBC 4.9 4.3 - 11.1 K/uL    RBC 4.22 4.05 - 5.25 M/uL    HGB 12.4 11.7 - 15.4 g/dL    HCT 37.4 35.8 - 46.3 %    MCV 88.6 79.6 - 97.8 FL    MCH 29.4 26.1 - 32.9 PG    MCHC 33.2 31.4 - 35.0 g/dL    RDW 13.5 11.9 - 14.6 %    PLATELET 158 549 - 221 K/uL    MPV 9.5 (L) 10.8 - 14.1 FL    DF AUTOMATED      NEUTROPHILS 61 43 - 78 %    LYMPHOCYTES 27 13 - 44 %    MONOCYTES 11 4.0 - 12.0 %    EOSINOPHILS 1 0.5 - 7.8 %    BASOPHILS 0 0.0 - 2.0 %    IMMATURE GRANULOCYTES 0 0.0 - 5.0 %    ABS. NEUTROPHILS 3.0 1.7 - 8.2 K/UL    ABS. LYMPHOCYTES 1.3 0.5 - 4.6 K/UL    ABS. MONOCYTES 0.5 0.1 - 1.3 K/UL    ABS. EOSINOPHILS 0.0 0.0 - 0.8 K/UL    ABS. BASOPHILS 0.0 0.0 - 0.2 K/UL    ABS. IMM. GRANS. 0.0 0.0 - 0.5 K/UL   PROTHROMBIN TIME + INR    Collection Time: 02/07/18  4:52 AM   Result Value Ref Range    Prothrombin time 17.3 (H) 11.5 - 14.5 sec    INR 1.5       EEG reviewed by me. Mild generalized slowing is present. No epileptiform activity. Intermittent tremor artifact is present.

## 2018-02-07 NOTE — PROGRESS NOTES
Face to Face Encounter    Patients Name: Ashleigh Hahn    YOB: 1950    Ordering Physician: Emeka Napoles    Primary Diagnosis: Tremors of nervous system    Date of Face to Face:   2/7/2018                                  Face to Face Encounter findings are related to primary reason for home care:   yes. 1. I certify that the patient needs intermittent care as follows: skilled nursing care:  skilled observation/assessment, patient education  physical therapy: strengthening, stretching/ROM, transfer training, gait/stair training, balance training and pt/caregiver education    2. I certify that this patient is homebound, that is: 1) patient requires the use of a n/a device, special transportation, or assistance of another to leave the home; or 2) patient's condition makes leaving the home medically contraindicated; and 3) patient has a normal inability to leave the home and leaving the home requires considerable and taxing effort. Patient may leave the home for infrequent and short duration for medical reasons, and occasional absences for non-medical reasons. Homebound status is due to the following functional limitations: Limited ambulation secondary to tremors. Ambulation limited to 20 feet with the use of a na (assistive device). Patient with strength deficits limiting the performance of all ADL's without caregiver assistance or the use of an assistive device. Patient with poor safety awareness and is at risk for falls without assistance of another person and the use of an assistive device. Patient with poor ambulation endurance limiting their safe ability to ascend/descend the required number of steps to leave the home.     3. I certify that this patient is under my care and that I, or a nurse practitioner or  [id number], or clinical nurse specialist, or certified nurse midwife, working with me, had a Face-to-Face Encounter that meets the physician Face-to-Face Encounter requirements. The following are the clinical findings from the [address] [place] [address] encounter that support the need for skilled services and is a summary of the encounter:     See hospital chart      Leda Lauraramon, [address]  2/7/2018      THE FOLLOWING TO BE COMPLETED BY THE COMMUNITY PHYSICIAN:    I concur with the findings described above from the F2F encounter that this patient is homebound and in need of a skilled service.     Certifying Physician: _____________________________________      Printed Certifying Physician Name: _____________________________________    Date: _________________

## 2018-02-07 NOTE — PROGRESS NOTES
Problem: Self Care Deficits Care Plan (Adult)  Goal: *Acute Goals and Plan of Care (Insert Text)  1. Patient will complete lower body bathing and dressing with supervision and adaptive equipment as needed. 2. Patient will complete toileting with supervision. 3. Patient will tolerate 23 minutes of OT treatment with minimal rest breaks to increase activity tolerance for ADLs. 4. Patient will complete functional transfers with supervision and adaptive equipment as needed. 5. Patient will complete self feeding with supervision and adaptive equipment as needed. Timeframe: 7 visits     OCCUPATIONAL THERAPY: Initial Assessment, Daily Note and Treatment Day: 1st 2/7/2018  OBSERVATION: Hospital Day: 3  [place] / Product Type: Medicare /      NAME/AGE/GENDER: Desmond Millan is a 75 y.o. female   PRIMARY DIAGNOSIS:  Tremors of nervous system <principal problem not specified> <principal problem not specified>        ICD-10: Treatment Diagnosis:    · Generalized Muscle Weakness (M62.81)  · Other lack of cordination (R27.8)   Precautions/Allergies:     Codeine; Penicillins; and Sulfa (sulfonamide antibiotics)      ASSESSMENT:     Ms. Umer Todd presents sitting edge of bed upon arrival.  Pt is oriented to self and place, confused about time and situation stating she is here because she \"ate too much honey on my nino crackers and it caused all this\". Pt exhibits impulsivity and decreased safety awareness. She transfers to bathroom with verbal cueing and CGA. Pt performs toileting tasks with verbal cueing for safety. Pt presents with tremor increasing with activities and attempting to focus on answering a question or completing a task. Pt would benefit from ADL equipment training. Ms. Umer Todd presents with decreased activity tolerance, and decreased independence for self care, functional mobility, and ADL's.  Requires skilled OT to maximize independence with self care tasks and functional transfers. Pt left sitting in chair with all needs in reach, pt instructed to call for assistance; RN aware. This section established at most recent assessment   PROBLEM LIST (Impairments causing functional limitations):  1. Decreased Strength  2. Decreased ADL/Functional Activities  3. Decreased Transfer Abilities  4. Decreased Ambulation Ability/Technique  5. Decreased Activity Tolerance  6. Decreased Work Simplification/Energy Conservation Techniques   INTERVENTIONS PLANNED: (Benefits and precautions of occupational therapy have been discussed with the patient.)  1. Activities of daily living training  2. Adaptive equipment training  3. Balance training  4. Donning&doffing training  5. Group therapy  6. Therapeutic activity  7. Therapeutic exercise     TREATMENT PLAN: Frequency/Duration: Follow patient 2x/week to address above goals. Rehabilitation Potential For Stated Goals: Good     RECOMMENDED REHABILITATION/EQUIPMENT: (at time of discharge pending progress): Due to the probability of continued deficits (see above) this patient will likely need continued skilled occupational therapy after discharge. Equipment:    TBD   None at this time              OCCUPATIONAL PROFILE AND HISTORY:   History of Present Injury/Illness (Reason for Referral):  See H&P  Past Medical History/Comorbidities:   Ms. Adry Sandhu  has a past medical history of Arrhythmia (7/14/2016); Atrial fibrillation ([facility] [address] (8/24/2015); Dry eye; Heart disease (7/14/2016); High blood pressure (7/14/2016); History of basal cell cancer (7/14/2016); Hyperlipidemia (7/14/2016); Hypertension (7/14/2016); Presence of cardiac pacemaker (8/24/2015); and Tremor (7/14/2016).   Ms. Adry Sandhu  has a past surgical history that includes hx other surgical; hx other surgical; hx salpingo-oophorectomy; hx colonoscopy (12/2011); hx other surgical (1982); hx tonsillectomy; hx heart catheterization; hx other surgical (6/12); and hx partial hysterectomy. Social History/Living Environment:   Home Environment: Private residence  One/Two Story Residence: One story  Living Alone: No  Support Systems: Spouse/Significant Other/Partner, Child(mac)  Patient Expects to be Discharged to[de-identified] Private residence  Current DME Used/Available at Home: Grab bars, Shower chair  Tub or Shower Type: Shower  Prior Level of Function/Work/Activity:  Modified independent per pt     Number of Personal Factors/Comorbidities that affect the Plan of Care: Expanded review of therapy/medical records (1-2):  MODERATE COMPLEXITY   ASSESSMENT OF OCCUPATIONAL PERFORMANCE[de-identified]   Activities of Daily Living:           Basic ADLs (From Assessment) Complex ADLs (From Assessment)   Basic ADL  Feeding: Minimum assistance  Oral Facial Hygiene/Grooming: Minimum assistance  Bathing: Minimum assistance  Upper Body Dressing: Contact guard assistance  Lower Body Dressing: Minimum assistance  Toileting: Stand by assistance Instrumental ADL  Meal Preparation: Moderate assistance  Homemaking: Moderate assistance   Grooming/Bathing/Dressing Activities of Daily Living     Cognitive Retraining  Safety/Judgement: Fall prevention;Decreased insight into deficits           Toileting  Toileting Assistance: Contact guard assistance  Bladder Hygiene: Supervision/set-up  Clothing Management: Contact guard assistance  Cues: Verbal cues provided;Visual cues provided; Tactile cues provided     Functional Transfers  Bathroom Mobility: Contact guard assistance  Toilet Transfer : Stand-by asssistance  Shower Transfer: Stand-by asssistance     Bed/Mat Mobility  Rolling: Independent  Supine to Sit: Independent  Sit to Stand: Supervision  Bed to Chair: Stand-by asssistance  Scooting: Independent       Most Recent Physical Functioning:   Gross Assessment:  AROM: Generally decreased, functional  Strength: Generally decreased, functional  Coordination: Generally decreased, functional  Tone: Abnormal               Posture: Balance:  Sitting: Intact  Standing: Intact  Standing - Static: Good  Standing - Dynamic : Fair Bed Mobility:  Rolling: Independent  Supine to Sit: Independent  Scooting: Independent  Wheelchair Mobility:     Transfers:  Sit to Stand: Supervision  Stand to Sit: Supervision  Bed to Chair: Stand-by asssistance              Patient Vitals for the past 6 hrs:   BP BP Patient Position SpO2 Pulse   18 1048 160/79 At rest 96 % 72   18 1559 134/72 At rest 95 % 65       Mental Status  Neurologic State: Alert, Appropriate for age  Orientation Level: Oriented X4  Cognition: Appropriate decision making  Perception: Appears intact  Perseveration: No perseveration noted  Safety/Judgement: Fall prevention, Decreased insight into deficits                          Physical Skills Involved:  1. Range of Motion  2. Balance  3. Strength  4. Activity Tolerance  5. Fine Motor Control  6. Gross Motor Control Cognitive Skills Affected (resulting in the inability to perform in a timely and safe manner):  1. Perception  2. Executive Function  3. Sustained Attention Psychosocial Skills Affected:  1. Habits/Routines  2. Environmental Adaptation  3. Self-Awareness   Number of elements that affect the Plan of Care: 5+:  HIGH COMPLEXITY   CLINICAL DECISION MAKIN [facility] [address] [address] AM-PAC 6 Clicks   Daily Activity Inpatient Short Form  How much help from another person does the patient currently need. .. Total A Lot A Little None   1. Putting on and taking off regular lower body clothing? [] 1   [x] 2   [] 3   [] 4   2. Bathing (including washing, rinsing, drying)? [] 1   [] 2   [x] 3   [] 4   3. Toileting, which includes using toilet, bedpan or urinal?   [] 1   [] 2   [x] 3   [] 4   4. Putting on and taking off regular upper body clothing? [] 1   [] 2   [x] 3   [] 4   5. Taking care of personal grooming such as brushing teeth? [] 1   [] 2   [x] 3   [] 4   6. Eating meals?    [] 1   [] 2   [x] 3   [] 4   © 2007, Trustees of [address] [place] [address] [address] [address], under license to [employer]. All rights reserved      Score:  Initial: 17 Most Recent: X (Date: -- )    Interpretation of Tool:  Represents activities that are increasingly more difficult (i.e. Bed mobility, Transfers, Gait). Score 24 23 22-20 19-15 14-10 9-7 6     Modifier CH CI CJ CK CL CM CN      ? Self Care:     - CURRENT STATUS: CK - 40%-59% impaired, limited or restricted    - GOAL STATUS: CJ - 20%-39% impaired, limited or restricted    - D/C STATUS:  ---------------To be determined---------------  [place] / Product Type: Medicare /      Medical Necessity:     · Patient demonstrates good rehab potential due to higher previous functional level. Reason for Services/Other Comments:  · Patient continues to require skilled intervention due to medical complications and patient unable to attend/participate in therapy as expected. Use of outcome tool(s) and clinical judgement create a POC that gives a: MODERATE COMPLEXITY         TREATMENT:   (In addition to Assessment/Re-Assessment sessions the following treatments were rendered)     Pre-treatment Symptoms/Complaints:    Pain: Initial:   Pain Intensity 1: 0  Post Session:  0     Self Care: (10 minutes): Procedure(s) (per grid) utilized to improve and/or restore self-care/home management as related to toileting and grooming. Required minimal visual, verbal and tactile cueing to facilitate activities of daily living skills and compensatory activities. Assessment    Braces/Orthotics/Lines/Etc:   · IV  · O2 Device: Room air  Treatment/Session Assessment:    · Response to Treatment:  Agrees to therapy  · Interdisciplinary Collaboration:   o Occupational Therapist  o Registered Nurse  · After treatment position/precautions:   o Up in chair  o Bed alarm/tab alert on  o Bed/Chair-wheels locked  o Call light within reach  o RN notified   · Compliance with Program/Exercises:  Will assess as treatment progresses. · Recommendations/Intent for next treatment session: \"Next visit will focus on advancements to more challenging activities and reduction in assistance provided\".   Total Treatment Duration:  OT Patient Time In/Time Out  Time In: 1131  Time Out: [address], OTR/L

## 2018-02-07 NOTE — PROGRESS NOTES
Initial visit by  to convey care and concern and encourage patient that  services are available if desired. No needs were voiced during the visit. Provided business card for future reference.      Janice Almonte 67  Board Certified

## 2018-02-07 NOTE — PROGRESS NOTES
Problem: Mobility Impaired (Adult and Pediatric)  Goal: *Acute Goals and Plan of Care (Insert Text)    PHYSICAL THERAPY: Initial Assessment, Discharge, Treatment Day: Day of Assessment, PM 2/7/2018  OBSERVATION: Hospital Day: 3  [place] / Product Type: Medicare /      NAME/AGE/GENDER: Morris Lyles is a 75 y.o. female   PRIMARY DIAGNOSIS: Tremors of nervous system <principal problem not specified> <principal problem not specified>        ICD-10: Treatment Diagnosis:   · Generalized Muscle Weakness (M62.81)  · Difficulty in walking, Not elsewhere classified (R26.2)   Precaution/Allergies:  Codeine; Penicillins; and Sulfa (sulfonamide antibiotics)      ASSESSMENT:     Ms. Sky Ramey presents supine at arrival and with UE tremors and son present. She was able to tolerate getting to EOB, standing, going to toilet, and returning to sitting at EOB, SUP for IV. Ambualtion was 150ft with no AD. Pt had no issues with balance. Gait speed was slow and distracted. After 150ft pt returned BTB. Pt will be discharged from PT at this time based on pt is at baseline. Son and pt described this treatment as a good experience despite UE tremors. Pt does not use AD at home. aniticipate home discharge when medically stable. This section established at most recent assessment   PROBLEM LIST (Impairments causing functional limitations):  1. Decreased Ambulation Ability/Technique   INTERVENTIONS PLANNED: (Benefits and precautions of physical therapy have been discussed with the patient.)  1. na     TREATMENT PLAN: Frequency/Duration: 1 time a week for 1 week  Rehabilitation Potential For Stated Goals: Good     RECOMMENDED REHABILITATION/EQUIPMENT: (at time of discharge pending progress): Due to the probability of continued deficits (see above) this patient will not likely need continued skilled physical therapy after discharge.   Equipment:    None at this time              HISTORY:   History of Present Injury/Illness (Reason for Referral):  75y.o. year-old female with a past medical history of Parkinson disease and atrial fibrillation on chronic coumadin who presents to the ER with complaint of worsening tremors and shaking. The patient is very somnolent at the time of my exam and cannot give any meaningful information. Per ER record, she take sinemet as prescribed and was concerned that these symptoms may have been caused by her eating a lot of honey 2 days ago. Naz Lopez Medical History/Comorbidities:   Ms. Tobi Barriga  has a past medical history of Arrhythmia (7/14/2016); Atrial fibrillation ([facility] [address] (8/24/2015); Dry eye; Heart disease (7/14/2016); High blood pressure (7/14/2016); History of basal cell cancer (7/14/2016); Hyperlipidemia (7/14/2016); Hypertension (7/14/2016); Presence of cardiac pacemaker (8/24/2015); and Tremor (7/14/2016). Ms. Tobi Barriga  has a past surgical history that includes hx other surgical; hx other surgical; hx salpingo-oophorectomy; hx colonoscopy (12/2011); hx other surgical (1982); hx tonsillectomy; hx heart catheterization; hx other surgical (6/12); and hx partial hysterectomy. Social History/Living Environment:   Home Environment: Private residence  One/Two Story Residence: One story  Living Alone: No  Support Systems: Spouse/Significant Other/Partner, Child(mac)  Patient Expects to be Discharged to[de-identified] Private residence  Prior Level of Function/Work/Activity:  Completely independent, no AD used     Number of Personal Factors/Comorbidities that affect the Plan of Care: 0: LOW COMPLEXITY   EXAMINATION:   Most Recent Physical Functioning:   Gross Assessment:  AROM: Within functional limits  Strength:  Within functional limits  Coordination: Generally decreased, functional  Tone: Abnormal  Sensation: Intact               Posture:     Balance:  Sitting: Intact  Standing: Intact Bed Mobility:  Rolling: Independent  Supine to Sit: Independent  Scooting: Independent  Wheelchair Mobility:     Transfers:  Sit to Stand: Supervision  Stand to Sit: Supervision  Gait:     Gait Abnormalities: Path deviations;Lurching  Distance (ft): 150 Feet (ft)  Assistive Device:  (none)  Ambulation - Level of Assistance: Stand-by asssistance      Body Structures Involved:  1. Nerves  2. Muscles Body Functions Affected:  1. Mental  2. Sensory/Pain  3. Neuromusculoskeletal  4. Movement Related Activities and Participation Affected:  1. General Tasks and Demands  2. Mobility  3. Self Care  4. Domestic Life  5. Community, Social and [place]   Number of elements that affect the Plan of Care: 1-2: LOW COMPLEXITY   CLINICAL PRESENTATION:   Presentation: Stable and uncomplicated: LOW COMPLEXITY   CLINICAL DECISION MAKIN [facility] Inpatient Short Form  How much difficulty does the patient currently have. .. Unable A Lot A Little None   1. Turning over in bed (including adjusting bedclothes, sheets and blankets)? [] 1   [] 2   [] 3   [x] 4   2. Sitting down on and standing up from a chair with arms ( e.g., wheelchair, bedside commode, etc.)   [] 1   [] 2   [] 3   [x] 4   3. Moving from lying on back to sitting on the side of the bed? [] 1   [] 2   [] 3   [x] 4   How much help from another person does the patient currently need. .. Total A Lot A Little None   4. Moving to and from a bed to a chair (including a wheelchair)? [] 1   [] 2   [] 3   [x] 4   5. Need to walk in hospital room? [] 1   [] 2   [] 3   [x] 4   6. Climbing 3-5 steps with a railing? [] 1   [] 2   [] 3   [x] 4   © , Trustees of [address] [place] [address] [address] [address], under license to [employer]. All rights reserved      Score:  Initial: 24 Most Recent: X (Date: -- )    Interpretation of Tool:  Represents activities that are increasingly more difficult (i.e. Bed mobility, Transfers, Gait). Score 24 23 22-20 19-15 14-10 9-7 6     Modifier CH CI CJ CK CL CM CN      ?  Mobility - Walking and Moving Around:  - CURRENT STATUS: CH - 0% impaired, limited or restricted    - GOAL STATUS: CH - 0% impaired, limited or restricted    - D/C STATUS:  CH - 0% impaired, limited or restricted  [place] / Product Type: Medicare /      Medical Necessity:     · NA. Reason for Services/Other Comments:  · NA. Use of outcome tool(s) and clinical judgement create a POC that gives a: Clear prediction of patient's progress: LOW COMPLEXITY            TREATMENT:   (In addition to Assessment/Re-Assessment sessions the following treatments were rendered)   Pre-treatment Symptoms/Complaints:  None    Pain: Initial:   Pain Intensity 1: 0  Post Session:  0     Therapeutic Activity: (    10min):  Therapeutic activities including Bed transfers, Chair transfers, Toilet transfers and Ambulation on level ground to improve mobility, strength, balance and coordination. Required minimal   to promote static and dynamic balance in standing, promote coordination of bilateral, lower extremity(s) and promote motor control of bilateral, lower extremity(s). Braces/Orthotics/Lines/Etc:   · IV  · O2 Device: Room air  Treatment/Session Assessment:    · Response to Treatment:  See above  · Interdisciplinary Collaboration:   o Physical Therapist  o Registered Nurse  · After treatment position/precautions:   o Supine in bed  o Call light within reach  o RN notified  o Family at bedside   · Compliance with Program/Exercises: compliant all of the time. · Recommendations/Intent for next treatment session: \"Next visit will focus on NA\".   Total Treatment Duration:  PT Patient Time In/Time Out  Time In: 1310  Time Out: GREYSON Lopes

## 2018-02-07 NOTE — PROGRESS NOTES
Hospitalist Progress Note    2018  Admit Date: 2018  4:38 PM   NAME: Bhumi Davila   :  1950   MRN:  [id number]   Attending: Adolph Estrada MD  PCP:  Alissa Gray MD    SUBJECTIVE:   Per previous documentation:  PMH of parkinsons disease followed by outpatient neuro Dr. Yajaira Campoverde on coumadin admitted with worsening tremors. Was seen by tele neuro with differential diagosis possibility of worsening parkinsons disease/ seizure/ infectious/ or CVA. CT head showed right parietal meningioma with pending MRI. Per conversation with Dr. Maricruz Whitfield of neurosurgery, the meningioma is less likely the cause of her tremors and can have serial followup with outpatient neurology. IR was also consulted to consider LP. INR 6.8 managed with oral vitamin K.    :  inr down to 1.5 today. Pt notes general fatigue but improving. Tremors at baseline per pt      Review of Systems negative with exception of pertinent positives noted above  PHYSICAL EXAM     Visit Vitals    /78 (BP 1 Location: Left arm, BP Patient Position: At rest)    Pulse 70    Temp 98.1 °F (36.7 °C)    Resp 16    Ht 5' 4\" (1.626 m)    Wt 47.6 kg (105 lb)    SpO2 96%    BMI 18.02 kg/m2      Temp (24hrs), Av.5 °F (36.9 °C), Min:98.1 °F (36.7 °C), Max:99.1 °F (37.3 °C)    Oxygen Therapy  O2 Sat (%): 96 % (18 06)  Pulse via Oximetry: 68 beats per minute (18)  O2 Device: Room air (18)    Intake/Output Summary (Last 24 hours) at 18 0851  Last data filed at 18 0833   Gross per 24 hour   Intake             1274 ml   Output                0 ml   Net             1274 ml      General: No acute distress    Lungs:  CTA Bilaterally.    Heart:  Regular rate and rhythm,  No murmur, rub, or gallop  Abdomen: Soft, Non distended, Non tender, Positive bowel sounds  Extremities: No cyanosis, clubbing or edema  Neurologic:  Tremors noted    ASSESSMENT      Active Hospital Problems    Diagnosis Date Noted    Tremors of nervous system 02/06/2018    Meningioma ([facility] [address] 02/06/2018    Parkinson disease ([facility] 08/15/2016    Hypertension 07/14/2016    Hyperlipidemia 07/14/2016    Atrial fibrillation ([facility] 08/24/2015    Presence of cardiac pacemaker 08/24/2015     Plan:  · Continue sinemet  · Outpatient neurosx follow up for meningioma. Not contributing to tremors  · No seizure activity noted on eeg  · PT to eval and treat.   [facility]  · Appreciate specialist input    DVT Prophylaxis: coumadin    Signed By: Bam Donohue MD     February 7, 2018

## 2018-02-07 NOTE — PROGRESS NOTES
Interventional Radiology Inpatient Communication    Interventional Radiology has received a consult for lumbar puncture. Please obtain/enter the imaging order for lumbar puncture. Any diagnostic labs to be performed on collected specimen must be entered into [facility] prior to transport to Interventional Radiology. Made aware of need for labs ordered in [facility] prior to sending for patient . We anticipate this procedure will be completed on 2/07/18 . Primary Care Nurse:Kamran Diez    Please ensure the following is completed:    Patient is NPO: no    Blood thinners held: yes    Patient must have working IV: no    Patient must be in a hospital gown with snaps and be transported via stretcher to Interventional Radiology. Please send hospital chart and labels. If the patient is unable to provide consent for the procedure, please contact Interventional Radiology at [phone].

## 2018-02-08 VITALS
SYSTOLIC BLOOD PRESSURE: 131 MMHG | HEART RATE: 71 BPM | TEMPERATURE: 98.2 F | WEIGHT: 105 LBS | HEIGHT: 64 IN | DIASTOLIC BLOOD PRESSURE: 79 MMHG | OXYGEN SATURATION: 98 % | BODY MASS INDEX: 17.93 KG/M2 | RESPIRATION RATE: 16 BRPM

## 2018-02-08 LAB
ANA SER QL: NEGATIVE
INR PPP: 1.2
PROTHROMBIN TIME: 14.7 SEC (ref 11.5–14.5)
SEE BELOW:, 164879: NORMAL

## 2018-02-08 PROCEDURE — 99218 HC RM OBSERVATION: CPT

## 2018-02-08 PROCEDURE — 74011250636 HC RX REV CODE- 250/636

## 2018-02-08 PROCEDURE — 74011250637 HC RX REV CODE- 250/637: Performed by: FAMILY MEDICINE

## 2018-02-08 PROCEDURE — 36415 COLL VENOUS BLD VENIPUNCTURE: CPT | Performed by: FAMILY MEDICINE

## 2018-02-08 PROCEDURE — 85610 PROTHROMBIN TIME: CPT | Performed by: FAMILY MEDICINE

## 2018-02-08 RX ADMIN — Medication 10 ML: at 05:00

## 2018-02-08 RX ADMIN — ESTROGENS, CONJUGATED 0.62 MG: 0.62 TABLET, FILM COATED ORAL at 09:00

## 2018-02-08 RX ADMIN — POTASSIUM CHLORIDE 20 MEQ: 20 TABLET, EXTENDED RELEASE ORAL at 09:45

## 2018-02-08 RX ADMIN — CARBIDOPA AND LEVODOPA 1 TABLET: 25; 250 TABLET ORAL at 05:00

## 2018-02-08 RX ADMIN — SODIUM CHLORIDE 100 ML/HR: 900 INJECTION, SOLUTION INTRAVENOUS at 04:57

## 2018-02-08 RX ADMIN — METOPROLOL SUCCINATE 25 MG: 25 TABLET, EXTENDED RELEASE ORAL at 09:45

## 2018-02-08 NOTE — DISCHARGE INSTRUCTIONS
DISCHARGE SUMMARY from Nurse    PATIENT INSTRUCTIONS:    After general anesthesia or intravenous sedation, for 24 hours or while taking prescription Narcotics:  · Limit your activities  · Do not drive and operate hazardous machinery  · Do not make important personal or business decisions  · Do  not drink alcoholic beverages  · If you have not urinated within 8 hours after discharge, please contact your surgeon on call. Report the following to your surgeon:  · Excessive pain, swelling, redness or odor of or around the surgical area  · Temperature over 100.5  · Nausea and vomiting lasting longer than 4 hours or if unable to take medications  · Any signs of decreased circulation or nerve impairment to extremity: change in color, persistent  numbness, tingling, coldness or increase pain  · Any questions    What to do at Home:  Recommended activity: Activity as tolerated    If you experience any of the following symptoms worsening tremors, changes in mental status, or fever, please follow up with your primary care physicain. *  Please give a list of your current medications to your Primary Care Provider. *  Please update this list whenever your medications are discontinued, doses are      changed, or new medications (including over-the-counter products) are added. *  Please carry medication information at all times in case of emergency situations. These are general instructions for a healthy lifestyle:    No smoking/ No tobacco products/ Avoid exposure to second hand smoke  Surgeon General's Warning:  Quitting smoking now greatly reduces serious risk to your health.     Obesity, smoking, and sedentary lifestyle greatly increases your risk for illness    A healthy diet, regular physical exercise & weight monitoring are important for maintaining a healthy lifestyle    You may be retaining fluid if you have a history of heart failure or if you experience any of the following symptoms:  Weight gain of 3 pounds or more overnight or 5 pounds in a week, increased swelling in our hands or feet or shortness of breath while lying flat in bed. Please call your doctor as soon as you notice any of these symptoms; do not wait until your next office visit. Recognize signs and symptoms of STROKE:    F-face looks uneven    A-arms unable to move or move unevenly    S-speech slurred or non-existent    T-time-call 911 as soon as signs and symptoms begin-DO NOT go       Back to bed or wait to see if you get better-TIME IS BRAIN. Warning Signs of HEART ATTACK     Call 911 if you have these symptoms:   Chest discomfort. Most heart attacks involve discomfort in the center of the chest that lasts more than a few minutes, or that goes away and comes back. It can feel like uncomfortable pressure, squeezing, fullness, or pain.  Discomfort in other areas of the upper body. Symptoms can include pain or discomfort in one or both arms, the back, neck, jaw, or stomach.  Shortness of breath with or without chest discomfort.  Other signs may include breaking out in a cold sweat, nausea, or lightheadedness. Don't wait more than five minutes to call 911 - MINUTES MATTER! Fast action can save your life. Calling 911 is almost always the fastest way to get lifesaving treatment. Emergency Medical Services staff can begin treatment when they arrive -- up to an hour sooner than if someone gets to the hospital by car. The discharge information has been reviewed with the patient. The patient verbalized understanding. Discharge medications reviewed with the patient and appropriate educational materials and side effects teaching were provided.   ___________________________________________________________________________________________________________________________________

## 2018-02-08 NOTE — DISCHARGE SUMMARY
Hospitalist Discharge Summary     Patient ID:  Chan Berger  [id number]  20 y.o.  1950  Admit date: 2/5/2018  4:38 PM  Discharge date and time: 2/8/2018  Attending: Mani Menard MD  PCP:  Shira Vargas MD  Treatment Team: Attending Provider: Mani Menard MD; Utilization Review: Angella Membreno RN; Consulting Provider: Jerry Epperson MD; Care Manager: KERVIN Gurrola    Principal Diagnosis <principal problem not specified>   Active Problems:    Atrial fibrillation ([facility] [address] (8/24/2015)      Presence of cardiac pacemaker (8/24/2015)      Hypertension (7/14/2016)      Hyperlipidemia (7/14/2016)      Parkinson disease ([facility] [address] (8/15/2016)      Tremors of nervous system (2/6/2018)      [facility] (2/6/2018)     HPI: Chan Berger is a 75y.o. year-old female with a past medical history of Parkinson disease and atrial fibrillation on chronic coumadin who presents to the ER with complaint of worsening tremors and shaking. The patient is very somnolent at the time of my exam and cannot give any meaningful information. Per ER record, she take sinemet as prescribed and was concerned that these symptoms may have been caused by her eating a lot of honey 2 days ago. Hospital Course:  Please refer to the admission H&P for details of presentation. In summary, the patient PMH of parkinsons disease followed by outpatient neuro Dr. Miroslava Woods on coumadin admitted with worsening tremors. Was seen by tele neuro with differential diagosis possibility of worsening parkinsons disease/ seizure/ infectious/ or CVA. CT head showed right parietal meningioma with pending MRI. Per conversation with Dr. Devon Sarkar of neurosurgery, the meningioma is less likely the cause of her tremors and can have serial followup with outpatient neurosurgery. Seen by neurology with plans to follow outpatient. No infection noted on workup. Tremors returned to baseline per pt.   Home health arranged by [facility], pt currently stable for dc to home. Labs: Results:       Chemistry Recent Labs      02/07/18   0452  02/06/18   0315  02/05/18   1520   GLU  90  84  63*   NA  142  143  139   K  3.8  3.8  4.1   CL  109*  108*  99   CO2  26  26  25   BUN  14  24*  20   CREA  0.58*  0.76  0.78   CA  8.3  8.4  9.5   AGAP  7  9  15   AP   --    --   68   TP   --    --   7.4   ALB   --    --   4.1   GLOB   --    --   3.3   AGRAT   --    --   1.2      CBC w/Diff Recent Labs      02/07/18   0452  02/06/18   0315  02/05/18   1520   WBC  4.9  7.3  12.2*   RBC  4.22  3.98*  4.94   HGB  12.4  11.7  14.5   HCT  37.4  35.0*  43.4   PLT  201  225  327   GRANS  61  67  74   LYMPH  27  23  17   EOS  1  0*  0*      Cardiac Enzymes Recent Labs      02/05/18   1520   CPK  302*      Coagulation Recent Labs      02/08/18   0501  02/07/18   0452   02/06/18   0104   PTP  14.7*  17.3*   < >  54.0*   INR  1.2  1.5   < >  6.0*   APTT   --    --    --   49.3*    < > = values in this interval not displayed. Lipid Panel No results found for: CHOL, CHOLPOCT, CHOLX, CHLST, CHOLV, 495253, HDL, LDL, LDLC, DLDLP, 912838, VLDLC, VLDL, TGLX, TRIGL, TRIGP, TGLPOCT, CHHD, CHHDX   BNP No results for input(s): BNPP in the last 72 hours. Liver Enzymes Recent Labs      02/05/18   1520   TP  7.4   ALB  4.1   AP  68   SGOT  34      Thyroid Studies Lab Results   Component Value Date/Time    TSH 0.175 (L) 02/06/2018 01:04 AM            Discharge Exam:  Visit Vitals    /79 (BP 1 Location: Left arm, BP Patient Position: At rest;Sitting)    Pulse 71    Temp 98.2 °F (36.8 °C)    Resp 16    Ht 5' 4\" (1.626 m)    Wt 47.6 kg (105 lb)    SpO2 98%    BMI 18.02 kg/m2     General appearance: alert, cooperative, no distress, appears stated age  Lungs: clear to auscultation bilaterally  Heart: regular rate and rhythm, S1, S2 normal, no murmur, click, rub or gallop  Abdomen: soft, non-tender.  Bowel sounds normal. No masses,  no organomegaly  Extremities: no cyanosis or edema  Neurologic: tremors    Disposition: home  Discharge Condition: stable  Patient Instructions:   Current Discharge Medication List      CONTINUE these medications which have NOT CHANGED    Details   carbidopa-levodopa (SINEMET-25/250)  mg per tablet Take 1 Tab by mouth four (4) times daily. Qty: 360 Tab, Refills: 3      carbidopa-levodopa CR (SINEMET CR)  mg per tablet 1 tab Qhs  Qty: 90 Tab, Refills: 3    Associated Diagnoses: Parkinson disease (HCC)      omega-3 fatty acids-vitamin e (FISH OIL) 1,000 mg cap Take 1 Cap by mouth. POTASSIUM CHLORIDE (KLOR-CON M20 PO) Take  by mouth daily. conjugated estrogens (PREMARIN) 0.625 mg tablet Take  by mouth daily. lovastatin (MEVACOR) 40 mg tablet Take 40 mg by mouth nightly. metoprolol succinate (TOPROL-XL) 25 mg XL tablet Take  by mouth two (2) times a day.          STOP taking these medications       multivitamin (ONE A DAY) tablet Comments:   Reason for Stopping:         warfarin (COUMADIN) 5 mg tablet Comments:   Reason for Stopping:               Activity: Activity as tolerated  Diet: Regular Diet  Wound Care: None needed    Follow-up  ·   With pcp, neuro, neurosurgery  Time spent to discharge patient 35 minutes  Signed:  Bobbi Carlson MD  2/8/2018  8:26 AM

## 2018-02-08 NOTE — PROGRESS NOTES
Discharge instructions given and reviewed with pt, verbalizes understanding, pt to be discharged home, waiting on ride, pt calling son.

## 2018-02-10 ENCOUNTER — HOSPITAL ENCOUNTER (EMERGENCY)
Age: 68
Discharge: HOME | End: 2018-02-10
Attending: EMERGENCY MEDICINE
Payer: MEDICARE

## 2018-02-10 ENCOUNTER — APPOINTMENT (OUTPATIENT)
Dept: GENERAL RADIOLOGY | Age: 68
End: 2018-02-10
Attending: EMERGENCY MEDICINE
Payer: MEDICARE

## 2018-02-10 VITALS
SYSTOLIC BLOOD PRESSURE: 155 MMHG | HEART RATE: 84 BPM | BODY MASS INDEX: 18.78 KG/M2 | TEMPERATURE: 98 F | OXYGEN SATURATION: 98 % | DIASTOLIC BLOOD PRESSURE: 74 MMHG | HEIGHT: 64 IN | WEIGHT: 110 LBS | RESPIRATION RATE: 18 BRPM

## 2018-02-10 DIAGNOSIS — N30.01 ACUTE CYSTITIS WITH HEMATURIA: Primary | ICD-10-CM

## 2018-02-10 DIAGNOSIS — G20 PARKINSON'S SYNDROME (HCC): ICD-10-CM

## 2018-02-10 LAB
ALBUMIN SERPL-MCNC: 3.7 G/DL (ref 3.2–4.6)
ALBUMIN/GLOB SERPL: 1.1 {RATIO} (ref 1.2–3.5)
ALP SERPL-CCNC: 60 U/L (ref 50–136)
ALT SERPL-CCNC: 19 U/L (ref 12–65)
ANION GAP SERPL CALC-SCNC: 7 MMOL/L (ref 7–16)
AST SERPL-CCNC: 26 U/L (ref 15–37)
BACTERIA URNS QL MICRO: NORMAL /HPF
BASOPHILS # BLD: 0 K/UL (ref 0–0.2)
BASOPHILS NFR BLD: 0 % (ref 0–2)
BILIRUB SERPL-MCNC: 0.6 MG/DL (ref 0.2–1.1)
BUN SERPL-MCNC: 11 MG/DL (ref 8–23)
CALCIUM SERPL-MCNC: 9.5 MG/DL (ref 8.3–10.4)
CASTS URNS QL MICRO: NORMAL /LPF
CHLORIDE SERPL-SCNC: 103 MMOL/L (ref 98–107)
CO2 SERPL-SCNC: 32 MMOL/L (ref 21–32)
CREAT SERPL-MCNC: 0.69 MG/DL (ref 0.6–1)
DIFFERENTIAL METHOD BLD: ABNORMAL
EOSINOPHIL # BLD: 0 K/UL (ref 0–0.8)
EOSINOPHIL NFR BLD: 0 % (ref 0.5–7.8)
EPI CELLS #/AREA URNS HPF: NORMAL /HPF
ERYTHROCYTE [DISTWIDTH] IN BLOOD BY AUTOMATED COUNT: 13.3 % (ref 11.9–14.6)
GLOBULIN SER CALC-MCNC: 3.3 G/DL (ref 2.3–3.5)
GLUCOSE SERPL-MCNC: 78 MG/DL (ref 65–100)
HCT VFR BLD AUTO: 39.8 % (ref 35.8–46.3)
HGB BLD-MCNC: 13.1 G/DL (ref 11.7–15.4)
IMM GRANULOCYTES # BLD: 0 K/UL (ref 0–0.5)
IMM GRANULOCYTES NFR BLD AUTO: 0 % (ref 0–5)
LYMPHOCYTES # BLD: 2.4 K/UL (ref 0.5–4.6)
LYMPHOCYTES NFR BLD: 25 % (ref 13–44)
MCH RBC QN AUTO: 29.2 PG (ref 26.1–32.9)
MCHC RBC AUTO-ENTMCNC: 32.9 G/DL (ref 31.4–35)
MCV RBC AUTO: 88.8 FL (ref 79.6–97.8)
MONOCYTES # BLD: 0.7 K/UL (ref 0.1–1.3)
MONOCYTES NFR BLD: 7 % (ref 4–12)
NEUTS SEG # BLD: 6.5 K/UL (ref 1.7–8.2)
NEUTS SEG NFR BLD: 68 % (ref 43–78)
PLATELET # BLD AUTO: 286 K/UL (ref 150–450)
PMV BLD AUTO: 10 FL (ref 10.8–14.1)
POTASSIUM SERPL-SCNC: 4.4 MMOL/L (ref 3.5–5.1)
PROT SERPL-MCNC: 7 G/DL (ref 6.3–8.2)
RBC # BLD AUTO: 4.48 M/UL (ref 4.05–5.25)
RBC #/AREA URNS HPF: NORMAL /HPF
SODIUM SERPL-SCNC: 142 MMOL/L (ref 136–145)
WBC # BLD AUTO: 9.7 K/UL (ref 4.3–11.1)
WBC URNS QL MICRO: NORMAL /HPF

## 2018-02-10 PROCEDURE — 74011000258 HC RX REV CODE- 258: Performed by: EMERGENCY MEDICINE

## 2018-02-10 PROCEDURE — 81015 MICROSCOPIC EXAM OF URINE: CPT | Performed by: EMERGENCY MEDICINE

## 2018-02-10 PROCEDURE — 74011250637 HC RX REV CODE- 250/637: Performed by: EMERGENCY MEDICINE

## 2018-02-10 PROCEDURE — 80053 COMPREHEN METABOLIC PANEL: CPT | Performed by: EMERGENCY MEDICINE

## 2018-02-10 PROCEDURE — 96365 THER/PROPH/DIAG IV INF INIT: CPT | Performed by: EMERGENCY MEDICINE

## 2018-02-10 PROCEDURE — 85025 COMPLETE CBC W/AUTO DIFF WBC: CPT | Performed by: EMERGENCY MEDICINE

## 2018-02-10 PROCEDURE — 74011250636 HC RX REV CODE- 250/636: Performed by: EMERGENCY MEDICINE

## 2018-02-10 PROCEDURE — 99283 EMERGENCY DEPT VISIT LOW MDM: CPT | Performed by: EMERGENCY MEDICINE

## 2018-02-10 RX ORDER — NITROFURANTOIN 25; 75 MG/1; MG/1
100 CAPSULE ORAL 2 TIMES DAILY
Qty: 6 CAP | Refills: 0 | Status: SHIPPED | OUTPATIENT
Start: 2018-02-10 | End: 2018-02-13

## 2018-02-10 RX ORDER — CARBIDOPA AND LEVODOPA 25; 250 MG/1; MG/1
1 TABLET ORAL
Status: COMPLETED | OUTPATIENT
Start: 2018-02-10 | End: 2018-02-10

## 2018-02-10 RX ORDER — WARFARIN 2.5 MG/1
2.5 TABLET ORAL DAILY
Qty: 15 TAB | Refills: 0 | Status: SHIPPED | OUTPATIENT
Start: 2018-02-10 | End: 2018-09-21

## 2018-02-10 RX ADMIN — CEFTRIAXONE SODIUM 1 G: 1 INJECTION, POWDER, FOR SOLUTION INTRAMUSCULAR; INTRAVENOUS at 20:13

## 2018-02-10 RX ADMIN — CARBIDOPA AND LEVODOPA 1 TABLET: 25; 250 TABLET ORAL at 19:26

## 2018-02-10 NOTE — ED TRIAGE NOTES
Pt arrived ambulatory via POV with c/o increased shaking d/t her parkinsons. Pt was here 3 days ago for the same problem. Pt reports pain in her shoulders, arms and legs.

## 2018-02-11 LAB
BACTERIA SPEC CULT: NORMAL
BACTERIA SPEC CULT: NORMAL
SERVICE CMNT-IMP: NORMAL
SERVICE CMNT-IMP: NORMAL

## 2018-02-11 NOTE — ED PROVIDER NOTES
HPI Comments: Patient complains of increased tremors from her Parkinson's Disease. She has had a bit of a runny nose. No cough or sore throat. Has felt warm at times and had chills but no known fever. Has had increased urinary frequency without dysuria. She says she has been taking her medications as prescribed. Son states family puts medication in her dispenser but it is up to her to take it. She denies nausea or vomiting. No diarrhea. Good appetite. Recently hospitalized. INR was elevated and Coumadin was held. Was found to have a probable meningioma on CT and had neurosurgery consult. To be followed up with MRI and follow up visit. The history is provided by the patient, medical records and a relative (son). Past Medical History:   Diagnosis Date    Arrhythmia 7/14/2016    Atrial fibrillation (HCC) 8/24/2015    Dry eye     Heart disease 7/14/2016    High blood pressure 7/14/2016    History of basal cell cancer 7/14/2016    Hyperlipidemia 7/14/2016    Hypertension 7/14/2016    Presence of cardiac pacemaker 8/24/2015    Tremor 7/14/2016       Past Surgical History:   Procedure Laterality Date    HX COLONOSCOPY  12/2011    HX HEART CATHETERIZATION      HX OTHER SURGICAL      Cardiac Electrophysiology Mapping and Ablation    HX OTHER SURGICAL      Lung Biopsy    HX OTHER SURGICAL  1982    skin cancer excision, basal cell carcinoma on forehead    HX OTHER SURGICAL  6/12    Cardiac pacemaker placement    HX PARTIAL HYSTERECTOMY      Abdominal     HX SALPINGO-OOPHORECTOMY      Laparoscopic, with MARIA ELENA    HX TONSILLECTOMY           Family History:   Problem Relation Age of Onset    Heart Disease Mother     Thyroid Disease Mother     Heart Disease Father     Cancer Father      Lung Cancer       Social History     Social History    Marital status:      Spouse name: N/A    Number of children: N/A    Years of education: N/A     Occupational History    Not on file.      Social History Main Topics    Smoking status: Never Smoker    Smokeless tobacco: Never Used    Alcohol use No    Drug use: No    Sexual activity: Not on file     Other Topics Concern    Not on file     Social History Narrative         ALLERGIES: Codeine; Penicillins; and Sulfa (sulfonamide antibiotics)    Review of Systems   Constitutional: Positive for chills and fatigue. Negative for appetite change and fever. HENT: Negative. Eyes: Negative. Respiratory: Negative. Cardiovascular: Negative. Gastrointestinal: Negative. Genitourinary: Positive for frequency. Negative for dysuria. Musculoskeletal: Positive for gait problem. Skin: Negative. Neurological: Positive for tremors. Negative for headaches. Hematological:        Coumadin being held       Vitals:    02/10/18 1729 02/10/18 2058   BP: 159/75 155/74   Pulse: 86 84   Resp: 18 18   Temp: 98 °F (36.7 °C)    SpO2: 98% 98%   Weight: 49.9 kg (110 lb)    Height: 5' 4\" (1.626 m)             Physical Exam   Constitutional: She is oriented to person, place, and time. thin   HENT:   Head: Normocephalic and atraumatic. Right Ear: External ear normal.   Left Ear: External ear normal.   Mouth/Throat: Oropharynx is clear and moist.   Eyes: Conjunctivae and EOM are normal. Pupils are equal, round, and reactive to light. Neck: Normal range of motion. Neck supple. Cardiovascular: Normal rate, normal heart sounds and intact distal pulses. irregular   Pulmonary/Chest: Effort normal and breath sounds normal.   Abdominal: Soft. Bowel sounds are normal.   Musculoskeletal: She exhibits no edema. Neurological: She is alert and oriented to person, place, and time. No cranial nerve deficit. Tremors of upper extremities   Skin: Skin is warm and dry. Psychiatric: She has a normal mood and affect. Nursing note and vitals reviewed.        MDM      ED Course       Procedures    Parkinson's Disease  Labs reviewed  UTI  Rocephin 1 gram IV  Rx macrobid  Sinemet given po  Improved  Rx Coumadin 2.5 mg daily - half of her previous dose  Advised to recheck with her Cardiologist / PCP to have INR rechecked next week  Take Sinemet as prescribed by Neurology  Follow up with Dr. Annmarie Brewer in reference to meningioma  Stay hydrated  Return with new or worse symptoms

## 2018-02-11 NOTE — DISCHARGE INSTRUCTIONS
Resume Coumadin 2.5 mg daily  Check with your Cardiologist about continuing the Coumadin  Take antibiotics until finished - Macrobid twice daily for 3 days  Take your Sinemet as prescribed  Drink a lot of fluids         Urinary Tract Infection in Women: Care Instructions  Your Care Instructions    A urinary tract infection, or UTI, is a general term for an infection anywhere between the kidneys and the urethra (where urine comes out). Most UTIs are bladder infections. They often cause pain or burning when you urinate. UTIs are caused by bacteria and can be cured with antibiotics. Be sure to complete your treatment so that the infection goes away. Follow-up care is a key part of your treatment and safety. Be sure to make and go to all appointments, and call your doctor if you are having problems. It's also a good idea to know your test results and keep a list of the medicines you take. How can you care for yourself at home? · Take your antibiotics as directed. Do not stop taking them just because you feel better. You need to take the full course of antibiotics. · Drink extra water and other fluids for the next day or two. This may help wash out the bacteria that are causing the infection. (If you have kidney, heart, or liver disease and have to limit fluids, talk with your doctor before you increase your fluid intake.)  · Avoid drinks that are carbonated or have caffeine. They can irritate the bladder. · Urinate often. Try to empty your bladder each time. · To relieve pain, take a hot bath or lay a heating pad set on low over your lower belly or genital area. Never go to sleep with a heating pad in place. To prevent UTIs  · Drink plenty of water each day. This helps you urinate often, which clears bacteria from your system. (If you have kidney, heart, or liver disease and have to limit fluids, talk with your doctor before you increase your fluid intake.)  · Urinate when you need to.   · Urinate right after you have sex. · Change sanitary pads often. · Avoid douches, bubble baths, feminine hygiene sprays, and other feminine hygiene products that have deodorants. · After going to the bathroom, wipe from front to back. When should you call for help? Call your doctor now or seek immediate medical care if:  ? · Symptoms such as fever, chills, nausea, or vomiting get worse or appear for the first time. ? · You have new pain in your back just below your rib cage. This is called flank pain. ? · There is new blood or pus in your urine. ? · You have any problems with your antibiotic medicine. ? Watch closely for changes in your health, and be sure to contact your doctor if:  ? · You are not getting better after taking an antibiotic for 2 days. ? · Your symptoms go away but then come back. Where can you learn more? Go to http://rylee-harvey.info/. Enter C945 in the search box to learn more about \"Urinary Tract Infection in Women: Care Instructions. \"  Current as of: May 12, 2017  Content Version: 11.4  © 2918-7120 [employer]. Care instructions adapted under license by [employer] (which disclaims liability or warranty for this information). If you have questions about a medical condition or this instruction, always ask your healthcare professional. [address] any warranty or liability for your use of this information.

## 2018-02-11 NOTE — ED NOTES
I have reviewed discharge instructions with the patient and caregiver. The patient and caregiver verbalized understanding. Patient left ED via Discharge Method: ambulatory to Home with (insert name of family/friend, self, transport son). Opportunity for questions and clarification provided. Patient given 2 scripts. To continue your aftercare when you leave the hospital, you may receive an automated call from our care team to check in on how you are doing. This is a free service and part of our promise to provide the best care and service to meet your aftercare needs.  If you have questions, or wish to unsubscribe from this service please call [phone]. Thank you for Choosing our [facility] Emergency Department.

## 2018-04-29 ENCOUNTER — HOSPITAL ENCOUNTER (EMERGENCY)
Age: 68
Discharge: HOME | End: 2018-04-29
Attending: EMERGENCY MEDICINE
Payer: MEDICARE

## 2018-04-29 ENCOUNTER — APPOINTMENT (OUTPATIENT)
Dept: GENERAL RADIOLOGY | Age: 68
End: 2018-04-29
Attending: EMERGENCY MEDICINE
Payer: MEDICARE

## 2018-04-29 VITALS
RESPIRATION RATE: 16 BRPM | SYSTOLIC BLOOD PRESSURE: 133 MMHG | HEIGHT: 64 IN | TEMPERATURE: 98.4 F | WEIGHT: 110 LBS | BODY MASS INDEX: 18.78 KG/M2 | HEART RATE: 77 BPM | DIASTOLIC BLOOD PRESSURE: 69 MMHG | OXYGEN SATURATION: 100 %

## 2018-04-29 DIAGNOSIS — R00.2 PALPITATIONS: Primary | ICD-10-CM

## 2018-04-29 DIAGNOSIS — B02.23 SHINGLES (HERPES ZOSTER) POLYNEUROPATHY: ICD-10-CM

## 2018-04-29 LAB
ALBUMIN SERPL-MCNC: 3.8 G/DL (ref 3.2–4.6)
ALBUMIN/GLOB SERPL: 1.2 {RATIO} (ref 1.2–3.5)
ALP SERPL-CCNC: 82 U/L (ref 50–136)
ALT SERPL-CCNC: 14 U/L (ref 12–65)
ANION GAP SERPL CALC-SCNC: 7 MMOL/L (ref 7–16)
AST SERPL-CCNC: 32 U/L (ref 15–37)
ATRIAL RATE: 312 BPM
BASOPHILS # BLD: 0.1 K/UL (ref 0–0.2)
BASOPHILS NFR BLD: 1 % (ref 0–2)
BILIRUB SERPL-MCNC: 0.8 MG/DL (ref 0.2–1.1)
BUN SERPL-MCNC: 16 MG/DL (ref 8–23)
CALCIUM SERPL-MCNC: 9.6 MG/DL (ref 8.3–10.4)
CALCULATED R AXIS, ECG10: 58 DEGREES
CALCULATED T AXIS, ECG11: 75 DEGREES
CHLORIDE SERPL-SCNC: 104 MMOL/L (ref 98–107)
CO2 SERPL-SCNC: 30 MMOL/L (ref 21–32)
CREAT SERPL-MCNC: 0.85 MG/DL (ref 0.6–1)
DIAGNOSIS, 93000: NORMAL
DIFFERENTIAL METHOD BLD: ABNORMAL
EOSINOPHIL # BLD: 0.1 K/UL (ref 0–0.8)
EOSINOPHIL NFR BLD: 1 % (ref 0.5–7.8)
ERYTHROCYTE [DISTWIDTH] IN BLOOD BY AUTOMATED COUNT: 14 % (ref 11.9–14.6)
GLOBULIN SER CALC-MCNC: 3.3 G/DL (ref 2.3–3.5)
GLUCOSE SERPL-MCNC: 111 MG/DL (ref 65–100)
HCT VFR BLD AUTO: 42.4 % (ref 35.8–46.3)
HGB BLD-MCNC: 13.8 G/DL (ref 11.7–15.4)
IMM GRANULOCYTES # BLD: 0 K/UL (ref 0–0.5)
IMM GRANULOCYTES NFR BLD AUTO: 0 % (ref 0–5)
INR PPP: 1
LYMPHOCYTES # BLD: 1.2 K/UL (ref 0.5–4.6)
LYMPHOCYTES NFR BLD: 17 % (ref 13–44)
MCH RBC QN AUTO: 28.7 PG (ref 26.1–32.9)
MCHC RBC AUTO-ENTMCNC: 32.5 G/DL (ref 31.4–35)
MCV RBC AUTO: 88.1 FL (ref 79.6–97.8)
MONOCYTES # BLD: 0.8 K/UL (ref 0.1–1.3)
MONOCYTES NFR BLD: 12 % (ref 4–12)
NEUTS SEG # BLD: 4.6 K/UL (ref 1.7–8.2)
NEUTS SEG NFR BLD: 69 % (ref 43–78)
PLATELET # BLD AUTO: 215 K/UL (ref 150–450)
PMV BLD AUTO: 9.6 FL (ref 10.8–14.1)
POTASSIUM SERPL-SCNC: 4 MMOL/L (ref 3.5–5.1)
PROT SERPL-MCNC: 7.1 G/DL (ref 6.3–8.2)
PROTHROMBIN TIME: 13 SEC (ref 11.5–14.5)
Q-T INTERVAL, ECG07: 372 MS
QRS DURATION, ECG06: 72 MS
QTC CALCULATION (BEZET), ECG08: 432 MS
RBC # BLD AUTO: 4.81 M/UL (ref 4.05–5.25)
SODIUM SERPL-SCNC: 141 MMOL/L (ref 136–145)
TROPONIN I SERPL-MCNC: <0.02 NG/ML (ref 0.02–0.05)
VENTRICULAR RATE, ECG03: 81 BPM
WBC # BLD AUTO: 6.8 K/UL (ref 4.3–11.1)

## 2018-04-29 PROCEDURE — 71045 X-RAY EXAM CHEST 1 VIEW: CPT

## 2018-04-29 PROCEDURE — 85610 PROTHROMBIN TIME: CPT | Performed by: EMERGENCY MEDICINE

## 2018-04-29 PROCEDURE — 80053 COMPREHEN METABOLIC PANEL: CPT | Performed by: EMERGENCY MEDICINE

## 2018-04-29 PROCEDURE — 85025 COMPLETE CBC W/AUTO DIFF WBC: CPT | Performed by: EMERGENCY MEDICINE

## 2018-04-29 PROCEDURE — 99284 EMERGENCY DEPT VISIT MOD MDM: CPT | Performed by: EMERGENCY MEDICINE

## 2018-04-29 PROCEDURE — 93005 ELECTROCARDIOGRAM TRACING: CPT | Performed by: EMERGENCY MEDICINE

## 2018-04-29 PROCEDURE — 84484 ASSAY OF TROPONIN QUANT: CPT | Performed by: EMERGENCY MEDICINE

## 2018-04-29 RX ORDER — GABAPENTIN 100 MG/1
100 CAPSULE ORAL 3 TIMES DAILY
Qty: 60 CAP | Refills: 0 | Status: SHIPPED | OUTPATIENT
Start: 2018-04-29 | End: 2018-04-29

## 2018-04-29 RX ORDER — GABAPENTIN 100 MG/1
100 CAPSULE ORAL 3 TIMES DAILY
Qty: 60 CAP | Refills: 0 | Status: SHIPPED | OUTPATIENT
Start: 2018-04-29

## 2018-04-29 NOTE — ED PROVIDER NOTES
HPI Comments: Patient is a 25-year-old female who is coming in with 2 complaints. Primarily her right leg shingles and has not improved. She is currently taking Valtrex for this and still has a lot of pain with any sleep. She does have Parkinson's and states she missed a dose of her medications and the shaking worse than usual.  She also has shortness of breath this morning when she woke up. She states she's had this previously in the past as well. She denies any cp to me did report some palpitations last night. Patient is a 75 y.o. female presenting with shortness of breath. The history is provided by the patient. Shortness of Breath   Pertinent negatives include no fever, no cough, no wheezing, no chest pain, no vomiting and no abdominal pain.         Past Medical History:   Diagnosis Date    Arrhythmia 7/14/2016    Atrial fibrillation (HCC) 8/24/2015    Dry eye     Heart disease 7/14/2016    High blood pressure 7/14/2016    History of basal cell cancer 7/14/2016    Hyperlipidemia 7/14/2016    Hypertension 7/14/2016    Presence of cardiac pacemaker 8/24/2015    Tremor 7/14/2016       Past Surgical History:   Procedure Laterality Date    HX COLONOSCOPY  12/2011    HX HEART CATHETERIZATION      HX OTHER SURGICAL      Cardiac Electrophysiology Mapping and Ablation    HX OTHER SURGICAL      Lung Biopsy    HX OTHER SURGICAL  1982    skin cancer excision, basal cell carcinoma on forehead    HX OTHER SURGICAL  6/12    Cardiac pacemaker placement    HX PARTIAL HYSTERECTOMY      Abdominal     HX SALPINGO-OOPHORECTOMY      Laparoscopic, with MARIA ELENA    HX TONSILLECTOMY           Family History:   Problem Relation Age of Onset    Heart Disease Mother     Thyroid Disease Mother     Heart Disease Father     Cancer Father      Lung Cancer       Social History     Social History    Marital status:      Spouse name: N/A    Number of children: N/A    Years of education: N/A     Occupational History    Not on file. Social History Main Topics    Smoking status: Never Smoker    Smokeless tobacco: Never Used    Alcohol use No    Drug use: No    Sexual activity: Not on file     Other Topics Concern    Not on file     Social History Narrative         ALLERGIES: Codeine; Penicillins; and Sulfa (sulfonamide antibiotics)    Review of Systems   Constitutional: Negative for chills and fever. Respiratory: Positive for shortness of breath. Negative for cough, chest tightness, wheezing and stridor. Cardiovascular: Positive for palpitations. Negative for chest pain. Gastrointestinal: Negative for abdominal pain, diarrhea, nausea and vomiting. Skin: Negative. All other systems reviewed and are negative. Vitals:    04/29/18 0525 04/29/18 0526   BP: 147/71 147/71   Pulse: 82 83   Resp: 20 18   Temp: 98.4 °F (36.9 °C)    SpO2: 98% 98%   Weight: 49.9 kg (110 lb)    Height: 5' 4\" (1.626 m)             Physical Exam   Constitutional: She is oriented to person, place, and time. She appears well-developed and well-nourished. No distress. HENT:   Head: Normocephalic and atraumatic. Eyes: Conjunctivae and EOM are normal. Pupils are equal, round, and reactive to light. No scleral icterus. Neck: Normal range of motion. Neck supple. Cardiovascular: Normal rate, regular rhythm and normal heart sounds. Pulmonary/Chest: Effort normal and breath sounds normal. No stridor. No respiratory distress. She has no wheezes. She has no rales. She exhibits no tenderness. Abdominal: Soft. Bowel sounds are normal. She exhibits no distension. There is no tenderness. There is no rebound and no guarding. Neurological: She is alert and oriented to person, place, and time. No cranial nerve deficit. Coordination normal.   No focal weakness   Skin: Skin is warm and dry. No rash noted. She is not diaphoretic. No erythema. No pallor. Psychiatric: She has a normal mood and affect.  Her behavior is normal. Nursing note and vitals reviewed. MDM  Number of Diagnoses or Management Options  Diagnosis management comments: I called and spoke with the patient's son who was with patient this morning she complained of palpitations to him and her only other complaint now is pain from the shingles on her right leg she started taking Valtrex, treat this symptomatically otherwise cardiac and lung exam are normal.      Brian Mccormack MD; 4/29/2018 @6:45 AM Voice dictation software was used during the making of this note. This software is not perfect and grammatical and other typographical errors may be present.   This note has not been proofread for errors.  ===================================================================        Amount and/or Complexity of Data Reviewed  Clinical lab tests: reviewed and ordered (Results for orders placed or performed during the hospital encounter of 04/29/18  -CBC WITH AUTOMATED DIFF       Result                                            Value                         Ref Range                       WBC                                               6.8                           4.3 - 11.1 K/uL                 RBC                                               4.81                          4.05 - 5.25 M/uL                HGB                                               13.8                          11.7 - 15.4 g/dL                HCT                                               42.4                          35.8 - 46.3 %                   MCV                                               88.1                          79.6 - 97.8 FL                  MCH                                               28.7                          26.1 - 32.9 PG                  MCHC                                              32.5                          31.4 - 35.0 g/dL                RDW                                               14.0                          11.9 - 14.6 %                   PLATELET 215                           150 - 450 K/uL                  MPV                                               9.6 (L)                       10.8 - 14.1 FL                  DF                                                AUTOMATED                                                     NEUTROPHILS                                       69                            43 - 78 %                       LYMPHOCYTES                                       17                            13 - 44 %                       MONOCYTES                                         12                            4.0 - 12.0 %                    EOSINOPHILS                                       1                             0.5 - 7.8 %                     BASOPHILS                                         1                             0.0 - 2.0 %                     IMMATURE GRANULOCYTES                             0                             0.0 - 5.0 %                     ABS. NEUTROPHILS                                  4.6                           1.7 - 8.2 K/UL                  ABS. LYMPHOCYTES                                  1.2                           0.5 - 4.6 K/UL                  ABS. MONOCYTES                                    0.8                           0.1 - 1.3 K/UL                  ABS. EOSINOPHILS                                  0.1                           0.0 - 0.8 K/UL                  ABS. BASOPHILS                                    0.1                           0.0 - 0.2 K/UL                  ABS. IMM.  GRANS.                                  0.0                           0.0 - 0.5 K/UL             -METABOLIC PANEL, COMPREHENSIVE       Result                                            Value                         Ref Range                       Sodium                                            141                           136 - 145 mmol/L                Potassium 4.0                           3.5 - 5.1 mmol/L                Chloride                                          104                           98 - 107 mmol/L                 CO2                                               30                            21 - 32 mmol/L                  Anion gap                                         7                             7 - 16 mmol/L                   Glucose                                           111 (H)                       65 - 100 mg/dL                  BUN                                               16                            8 - 23 MG/DL                    Creatinine                                        0.85                          0.6 - 1.0 MG/DL                 GFR est AA                                        >60                           >60 ml/min/1.73m2               GFR est non-AA                                    >60                           >60 ml/min/1.73m2               Calcium                                           9.6                           8.3 - 10.4 MG/DL                Bilirubin, total                                  0.8                           0.2 - 1.1 MG/DL                 ALT (SGPT)                                        14                            12 - 65 U/L                     AST (SGOT)                                        32                            15 - 37 U/L                     Alk. phosphatase                                  82                            50 - 136 U/L                    Protein, total                                    7.1                           6.3 - 8.2 g/dL                  Albumin                                           3.8                           3.2 - 4.6 g/dL                  Globulin                                          3.3                           2.3 - 3.5 g/dL                  A-G Ratio                                         1.2 1.2 - 3.5                  -TROPONIN I       Result                                            Value                         Ref Range                       Troponin-I, Qt.                                   <0.02 (L)                     0.02 - 0.05 NG/ML          -PROTHROMBIN TIME + INR       Result                                            Value                         Ref Range                       Prothrombin time                                  13.0                          11.5 - 14.5 sec                 INR                                               1.0                                                      -EKG, 12 LEAD, INITIAL       Result                                            Value                         Ref Range                       Ventricular Rate                                  81                            BPM                             Atrial Rate                                       312                           BPM                             QRS Duration                                      72                            ms                              Q-T Interval                                      372                           ms                              QTC Calculation (Bezet)                           432                           ms                              Calculated R Axis                                 58                            degrees                         Calculated T Axis                                 75                            degrees                         Diagnosis                                                                                                   !! AGE AND GENDER SPECIFIC ECG ANALYSIS !!    Accelerated Junctional rhythm   Nonspecific ST and T wave abnormality   Abnormal ECG   No previous ECGs available    )  Tests in the radiology section of CPT®: ordered and reviewed (Xr Chest Port    Result Date: 4/29/2018  EXAM: TEMPORARY INDICATION:  Chest pain and shortness of breath COMPARISON:  2/6/2018 FINDINGS: A portable AP radiograph of the chest was obtained at 0527 hours. Pacemaker lead position is normal.  The lungs are clear. The cardiac and mediastinal contours and pulmonary vascularity are normal.  The bones and soft tissues are grossly within normal limits.      IMPRESSION: Normal chest.    )  Independent visualization of images, tracings, or specimens: yes          ED Course       Procedures

## 2018-04-29 NOTE — ED NOTES
Discharge instructions reviewed with pt; pt verbalizes understanding & denies further questioning. Pt transported via wheelchair to front-seat passenger.

## 2018-04-29 NOTE — DISCHARGE INSTRUCTIONS
Shingles: Care Instructions  Your Care Instructions    Shingles (herpes zoster) causes pain and a blistered rash. The rash can appear anywhere on the body but will be on only one side of the body, the left or right. It will be in a band, a strip, or a small area. The pain can be very severe. Shingles can also cause tingling or itching in the area of the rash. The blisters scab over after a few days and heal in 2 to 4 weeks. Medicines can help you feel better and may help prevent more serious problems caused by shingles. Shingles is caused by the same virus that causes chickenpox. When you have chickenpox, the virus gets into your nerve roots and stays there (becomes dormant) long after you get over the chickenpox. If the virus becomes active again, it can cause shingles. Follow-up care is a key part of your treatment and safety. Be sure to make and go to all appointments, and call your doctor if you are having problems. It's also a good idea to know your test results and keep a list of the medicines you take. How can you care for yourself at home? · Be safe with medicines. Take your medicines exactly as prescribed. Call your doctor if you think you are having a problem with your medicine. Antiviral medicine helps you get better faster. · Try not to scratch or pick at the blisters. They will crust over and fall off on their own if you leave them alone. · Put cool, wet cloths on the area to relieve pain and itching. You can also use calamine lotion. Try not to use so much lotion that it cakes and is hard to get off. · Put cornstarch or baking soda on the sores to help dry them out so they heal faster. · Do not use thick ointment, such as petroleum jelly, on the sores. This will keep them from drying and healing. · To help remove loose crusts, soak them in tap water. This can help decrease oozing, and dry and soothe the skin.   · Take an over-the-counter pain medicine, such as acetaminophen (Tylenol), ibuprofen (Advil, Motrin), or naproxen (Aleve). Read and follow all instructions on the label. · Avoid close contact with people until the blisters have healed. It is very important for you to avoid contact with anyone who has never had chickenpox or the chickenpox vaccine. Pregnant women, young babies, and anyone else who has a hard time fighting infection (such as someone with HIV, diabetes, or cancer) is especially at risk. When should you call for help? Call your doctor now or seek immediate medical care if:  ? · You have a new or higher fever. ? · You have a severe headache and a stiff neck. ? · You lose the ability to think clearly. ? · The rash spreads to your forehead, nose, eyes, or eyelids. ? · You have eye pain, or your vision gets worse. ? · You have new pain in your face, or you cannot move the muscles in your face. ? · Blisters spread to new parts of your body. ? Watch closely for changes in your health, and be sure to contact your doctor if:  ? · The rash has not healed after 2 to 4 weeks. ? · You still have pain after the rash has healed. Where can you learn more? Go to http://[employer].info/. Michael Klein in the search box to learn more about \"Shingles: Care Instructions. \"  Current as of: March 3, 2017  Content Version: 11.4  © 5321-0852 [employer]. Care instructions adapted under license by [employer] (which disclaims liability or warranty for this information). If you have questions about a medical condition or this instruction, always ask your healthcare professional. [address] any warranty or liability for your use of this information. Palpitations: Care Instructions  Your Care Instructions    Heart palpitations are the uncomfortable sensation that your heart is beating fast or irregularly.  You might feel pounding or fluttering in your chest. It might feel like your heart is skipping a beat.  Although palpitations may be caused by a heart problem, they also occur because of stress, fatigue, or use of alcohol, caffeine, or nicotine. Many medicines, including diet pills, antihistamines, decongestants, and some herbal products, can cause heart palpitations. Nearly everyone has palpitations from time to time. Depending on your symptoms, your doctor may need to do more tests to try to find the cause of your palpitations. Follow-up care is a key part of your treatment and safety. Be sure to make and go to all appointments, and call your doctor if you are having problems. It's also a good idea to know your test results and keep a list of the medicines you take. How can you care for yourself at home? · Avoid caffeine, nicotine, and excess alcohol. · Do not take illegal drugs, such as methamphetamines and cocaine. · Do not take weight loss or diet medicines unless you talk with your doctor first.  · Get plenty of sleep. · Do not overeat. · If you have palpitations again, take deep breaths and try to relax. This may slow a racing heart. · If you start to feel lightheaded, lie down to avoid injuries that might result if you pass out and fall down. · Keep a record of your palpitations and bring it to your next doctor's appointment. Write down:  ¨ The date and time. ¨ Your pulse. (If your heart is beating fast, it may be hard to count your pulse.)  ¨ What you were doing when the palpitations started. ¨ How long the palpitations lasted. ¨ Any other symptoms. · If an activity causes palpitations, slow down or stop. Talk to your doctor before you do that activity again. · Take your medicines exactly as prescribed. Call your doctor if you think you are having a problem with your medicine. When should you call for help? Call 911 anytime you think you may need emergency care. For example, call if:  ? · You passed out (lost consciousness). ? · You have symptoms of a heart attack.  These may include:  ¨ Chest pain or pressure, or a strange feeling in the chest.  ¨ Sweating. ¨ Shortness of breath. ¨ Pain, pressure, or a strange feeling in the back, neck, jaw, or upper belly or in one or both shoulders or arms. ¨ Lightheadedness or sudden weakness. ¨ A fast or irregular heartbeat. After you call 911, the  may tell you to chew 1 adult-strength or 2 to 4 low-dose aspirin. Wait for an ambulance. Do not try to drive yourself. ? · You have symptoms of a stroke. These may include:  ¨ Sudden numbness, tingling, weakness, or loss of movement in your face, arm, or leg, especially on only one side of your body. ¨ Sudden vision changes. ¨ Sudden trouble speaking. ¨ Sudden confusion or trouble understanding simple statements. ¨ Sudden problems with walking or balance. ¨ A sudden, severe headache that is different from past headaches. ?Call your doctor now or seek immediate medical care if:  ? · You have heart palpitations and:  ¨ Are dizzy or lightheaded, or you feel like you may faint. ¨ Have new or increased shortness of breath. ? Watch closely for changes in your health, and be sure to contact your doctor if:  ? · You continue to have heart palpitations. Where can you learn more? Go to http://[employer]lee-[employer].info/. Enter R508 in the search box to learn more about \"Palpitations: Care Instructions. \"  Current as of: September 21, 2016  Content Version: 11.4  © 0512-9846 [employer]. Care instructions adapted under license by [email or web address] (which disclaims liability or warranty for this information). If you have questions about a medical condition or this instruction, always ask your healthcare professional. [place] [address] any warranty or liability for your use of this information.

## 2018-04-29 NOTE — ED TRIAGE NOTES
Patient states shortness of breath. States has shingles and was placed on steroids which did not help and now is taking valtrex. States shortness of breath that started when she woke up this morning. Also states upper abdominal pain.

## 2018-06-19 ENCOUNTER — HOSPITAL ENCOUNTER (EMERGENCY)
Age: 68
Discharge: HOME | End: 2018-06-19
Attending: EMERGENCY MEDICINE
Payer: MEDICARE

## 2018-06-19 ENCOUNTER — APPOINTMENT (OUTPATIENT)
Dept: CT IMAGING | Age: 68
End: 2018-06-19
Attending: EMERGENCY MEDICINE
Payer: MEDICARE

## 2018-06-19 VITALS
TEMPERATURE: 97.7 F | BODY MASS INDEX: 19.63 KG/M2 | WEIGHT: 100 LBS | OXYGEN SATURATION: 98 % | HEART RATE: 69 BPM | SYSTOLIC BLOOD PRESSURE: 134 MMHG | DIASTOLIC BLOOD PRESSURE: 67 MMHG | RESPIRATION RATE: 16 BRPM | HEIGHT: 60 IN

## 2018-06-19 DIAGNOSIS — S40.812A ABRASION OF LEFT UPPER EXTREMITY, INITIAL ENCOUNTER: ICD-10-CM
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
## 2018-06-19 DIAGNOSIS — S00.01XA ABRASION OF SCALP, INITIAL ENCOUNTER: ICD-10-CM

## 2018-06-19 LAB
ALBUMIN SERPL-MCNC: 3.7 G/DL (ref 3.2–4.6)
ALBUMIN/GLOB SERPL: 1.2 {RATIO} (ref 1.2–3.5)
ALP SERPL-CCNC: 69 U/L (ref 50–136)
ALT SERPL-CCNC: 11 U/L (ref 12–65)
ANION GAP SERPL CALC-SCNC: 8 MMOL/L (ref 7–16)
AST SERPL-CCNC: 23 U/L (ref 15–37)
ATRIAL RATE: 72 BPM
BASOPHILS # BLD: 0 K/UL (ref 0–0.2)
BASOPHILS NFR BLD: 0 % (ref 0–2)
BILIRUB SERPL-MCNC: 0.7 MG/DL (ref 0.2–1.1)
BUN SERPL-MCNC: 12 MG/DL (ref 8–23)
CALCIUM SERPL-MCNC: 9.3 MG/DL (ref 8.3–10.4)
CALCULATED P AXIS, ECG09: 78 DEGREES
CALCULATED R AXIS, ECG10: 72 DEGREES
CALCULATED T AXIS, ECG11: 73 DEGREES
CHLORIDE SERPL-SCNC: 105 MMOL/L (ref 98–107)
CO2 SERPL-SCNC: 28 MMOL/L (ref 21–32)
CREAT SERPL-MCNC: 0.68 MG/DL (ref 0.6–1)
DIAGNOSIS, 93000: NORMAL
DIFFERENTIAL METHOD BLD: ABNORMAL
EOSINOPHIL # BLD: 0 K/UL (ref 0–0.8)
EOSINOPHIL NFR BLD: 0 % (ref 0.5–7.8)
ERYTHROCYTE [DISTWIDTH] IN BLOOD BY AUTOMATED COUNT: 14.6 % (ref 11.9–14.6)
GLOBULIN SER CALC-MCNC: 3.2 G/DL (ref 2.3–3.5)
GLUCOSE SERPL-MCNC: 139 MG/DL (ref 65–100)
HCT VFR BLD AUTO: 40.9 % (ref 35.8–46.3)
HGB BLD-MCNC: 13.3 G/DL (ref 11.7–15.4)
IMM GRANULOCYTES # BLD: 0 K/UL (ref 0–0.5)
IMM GRANULOCYTES NFR BLD AUTO: 0 % (ref 0–5)
LYMPHOCYTES # BLD: 1.6 K/UL (ref 0.5–4.6)
LYMPHOCYTES NFR BLD: 26 % (ref 13–44)
MCH RBC QN AUTO: 28.8 PG (ref 26.1–32.9)
MCHC RBC AUTO-ENTMCNC: 32.5 G/DL (ref 31.4–35)
MCV RBC AUTO: 88.5 FL (ref 79.6–97.8)
MONOCYTES # BLD: 0.4 K/UL (ref 0.1–1.3)
MONOCYTES NFR BLD: 6 % (ref 4–12)
NEUTS SEG # BLD: 4.1 K/UL (ref 1.7–8.2)
NEUTS SEG NFR BLD: 68 % (ref 43–78)
P-R INTERVAL, ECG05: 162 MS
PLATELET # BLD AUTO: 227 K/UL (ref 150–450)
PMV BLD AUTO: 9.9 FL (ref 10.8–14.1)
POTASSIUM SERPL-SCNC: 4.2 MMOL/L (ref 3.5–5.1)
PROT SERPL-MCNC: 6.9 G/DL (ref 6.3–8.2)
Q-T INTERVAL, ECG07: 414 MS
QRS DURATION, ECG06: 78 MS
QTC CALCULATION (BEZET), ECG08: 453 MS
RBC # BLD AUTO: 4.62 M/UL (ref 4.05–5.25)
SODIUM SERPL-SCNC: 141 MMOL/L (ref 136–145)
VENTRICULAR RATE, ECG03: 72 BPM
WBC # BLD AUTO: 6.1 K/UL (ref 4.3–11.1)

## 2018-06-19 PROCEDURE — 70450 CT HEAD/BRAIN W/O DYE: CPT

## 2018-06-19 PROCEDURE — 80053 COMPREHEN METABOLIC PANEL: CPT | Performed by: EMERGENCY MEDICINE

## 2018-06-19 PROCEDURE — 93005 ELECTROCARDIOGRAM TRACING: CPT | Performed by: EMERGENCY MEDICINE

## 2018-06-19 PROCEDURE — 81003 URINALYSIS AUTO W/O SCOPE: CPT | Performed by: EMERGENCY MEDICINE

## 2018-06-19 PROCEDURE — 85025 COMPLETE CBC W/AUTO DIFF WBC: CPT | Performed by: EMERGENCY MEDICINE

## 2018-06-19 PROCEDURE — 99284 EMERGENCY DEPT VISIT MOD MDM: CPT | Performed by: EMERGENCY MEDICINE

## 2018-06-19 PROCEDURE — 72125 CT NECK SPINE W/O DYE: CPT

## 2018-06-19 NOTE — ED TRIAGE NOTES
Charisse Callander backwards down concrete steps, struck head. Patient confused and dizzy in triage. Pupils round equal and reactive to light. Patient oriented x 3-4. Baseline per family is frequent falls, unsteady on feet due to medications. Lac present to posterior of head. Bleeding controlled in triage.

## 2018-06-19 NOTE — ED PROVIDER NOTES
HPI Comments: Pt is a 76 yo female with a history of parkinson's and alzheimer's disease she has unsteady gait at baseline per her son. She had fall today that she is unable to describe. The believes she hit her head on the concrete. She was able to fully range all extremities and has no pain except to the back of her head and neck. I placed preliminary orders including, ct scans head and neck, labs, and urine. She is currently stable. I have done preliminary assessment in triage. Patient will now be handled primarily by the primary team.    Dina Garcia MD; 6/19/2018 @5:59 PM Voice dictation software was used during the making of this note. This software is not perfect and grammatical and other typographical errors may be present. This note has not been proofread for errors.  ===================================================================      8:21 PM  Patient presents to the ER today having a fall. This was witnessed by her son. Reports she did fall and hit the back of her head. Denies any reported syncope or loss of consciousness. She also had abrasion to her left forearm. History is limited as patient has a history of dementia as well as Parkinson's. However she also is on warfarin    Patient is a 75 y.o. female presenting with head injury. The history is provided by the patient and a relative. Head Injury    The incident occurred 3 to 5 hours ago. She came to the ER via walk-in. The injury mechanism was a direct blow. Pertinent negatives include no numbness, no blurred vision, no disorientation, no weakness and no memory loss.         Past Medical History:   Diagnosis Date    Arrhythmia 7/14/2016    Atrial fibrillation ([address] 8/24/2015    Dry eye     Heart disease 7/14/2016    High blood pressure 7/14/2016    History of basal cell cancer 7/14/2016    Hyperlipidemia 7/14/2016    Hypertension 7/14/2016    Presence of cardiac pacemaker 8/24/2015    Tremor 7/14/2016       Past Surgical History:   Procedure Laterality Date    HX COLONOSCOPY  12/2011    HX HEART CATHETERIZATION      HX OTHER SURGICAL      Cardiac Electrophysiology Mapping and Ablation    HX OTHER SURGICAL      Lung Biopsy    HX OTHER SURGICAL  1982    skin cancer excision, basal cell carcinoma on forehead    HX OTHER SURGICAL  6/12    Cardiac pacemaker placement    HX PARTIAL HYSTERECTOMY      Abdominal     HX SALPINGO-OOPHORECTOMY      Laparoscopic, with MARIA ELENA    HX TONSILLECTOMY           Family History:   Problem Relation Age of Onset    Heart Disease Mother     Thyroid Disease Mother     Heart Disease Father     Cancer Father      Lung Cancer       Social History     Social History    Marital status:      Spouse name: N/A    Number of children: N/A    Years of education: N/A     Occupational History    Not on file. Social History Main Topics    Smoking status: Never Smoker    Smokeless tobacco: Never Used    Alcohol use No    Drug use: No    Sexual activity: Not on file     Other Topics Concern    Not on file     Social History Narrative         ALLERGIES: Codeine; Penicillins; and Sulfa (sulfonamide antibiotics)    Review of Systems   Constitutional: Negative for fatigue and fever. HENT: Negative for congestion and dental problem. Eyes: Negative for blurred vision, photophobia, redness and visual disturbance. Respiratory: Negative for chest tightness and shortness of breath. Cardiovascular: Negative for palpitations and leg swelling. Gastrointestinal: Negative for abdominal pain. Endocrine: Negative for polydipsia and polyphagia. Genitourinary: Negative for flank pain, frequency, genital sores and urgency. Musculoskeletal: Negative for back pain and gait problem. Skin: Positive for wound. Negative for pallor. Allergic/Immunologic: Negative for food allergies and immunocompromised state. Neurological: Negative for weakness, light-headedness and numbness. Hematological: Does not bruise/bleed easily. Psychiatric/Behavioral: Negative for behavioral problems, decreased concentration and memory loss. All other systems reviewed and are negative. Vitals:    06/19/18 1746   BP: 139/77   Pulse: 75   Resp: 18   Temp: 97.7 °F (36.5 °C)   SpO2: 98%   Weight: 45.4 kg (100 lb)   Height: 5' (1.524 m)            Physical Exam   Constitutional: She is oriented to person, place, and time. She appears well-developed and well-nourished. HENT:   Head: Normocephalic and atraumatic. Mouth/Throat: Oropharynx is clear and moist.   Eyes: Conjunctivae and EOM are normal. Pupils are equal, round, and reactive to light. No scleral icterus. Neck: Normal range of motion. Neck supple. No tracheal deviation present. No thyromegaly present. Cardiovascular: Normal rate, regular rhythm and normal heart sounds. Pulmonary/Chest: Effort normal and breath sounds normal. No respiratory distress. She has no wheezes. Abdominal: Soft. Bowel sounds are normal. She exhibits no distension. There is no tenderness. Musculoskeletal: Normal range of motion. She exhibits no edema, tenderness or deformity. Arms:  Neurological: She is alert and oriented to person, place, and time. She has normal reflexes. No cranial nerve deficit. Coordination normal.   Nursing note and vitals reviewed. MDM  Number of Diagnoses or Management Options  Diagnosis management comments: We'll obtain CT scan of the head as well as cervical spine.   Continue to monitor symptoms       Amount and/or Complexity of Data Reviewed  Clinical lab tests: ordered and reviewed  Tests in the radiology section of CPT®: ordered and reviewed    Risk of Complications, Morbidity, and/or Mortality  Presenting problems: moderate  Diagnostic procedures: moderate  Management options: moderate          ED Course       Procedures

## 2018-06-20 NOTE — ED NOTES
I have reviewed discharge instructions with the patient. The patient verbalized understanding. Patient left ED via Discharge Method: wheelchair to Home with self and family member./    Opportunity for questions and clarification provided. Patient given 0 scripts. To continue your aftercare when you leave the hospital, you may receive an automated call from our care team to check in on how you are doing. This is a free service and part of our promise to provide the best care and service to meet your aftercare needs.  If you have questions, or wish to unsubscribe from this service please call [phone]. Thank you for Choosing our [facility] Emergency Department.

## 2018-06-20 NOTE — DISCHARGE INSTRUCTIONS
Scrapes (Abrasions): Care Instructions  Your Care Instructions  Scrapes (abrasions) are wounds where your skin has been rubbed or torn off. Most scrapes do not go deep into the skin, but some may remove several layers of skin. Scrapes usually don't bleed much, but they may ooze pinkish fluid. Scrapes on the head or face may appear worse than they are. They may bleed a lot because of the good blood supply to this area. Most scrapes heal well and may not need a bandage. They usually heal within 3 to 7 days. A large, deep scrape may take 1 to 2 weeks or longer to heal. A scab may form on some scrapes. Follow-up care is a key part of your treatment and safety. Be sure to make and go to all appointments, and call your doctor if you are having problems. It's also a good idea to know your test results and keep a list of the medicines you take. How can you care for yourself at home? · If your doctor told you how to care for your wound, follow your doctor's instructions. If you did not get instructions, follow this general advice:  ¨ Wash the scrape with clean water 2 times a day. Don't use hydrogen peroxide or alcohol, which can slow healing. ¨ You may cover the scrape with a thin layer of petroleum jelly, such as Vaseline, and a nonstick bandage. ¨ Apply more petroleum jelly and replace the bandage as needed. · Prop up the injured area on a pillow anytime you sit or lie down during the next 3 days. Try to keep it above the level of your heart. This will help reduce swelling. · Be safe with medicines. Take pain medicines exactly as directed. ¨ If the doctor gave you a prescription medicine for pain, take it as prescribed. ¨ If you are not taking a prescription pain medicine, ask your doctor if you can take an over-the-counter medicine. When should you call for help?   Call your doctor now or seek immediate medical care if:  ? · You have signs of infection, such as:  ¨ Increased pain, swelling, warmth, or redness around the scrape. ¨ Red streaks leading from the scrape. ¨ Pus draining from the scrape. ¨ A fever. ? · The scrape starts to bleed, and blood soaks through the bandage. Oozing small amounts of blood is normal.   ? Watch closely for changes in your health, and be sure to contact your doctor if the scrape is not getting better each day. Where can you learn more? Go to http://rylee-harvey.info/. Enter A374 in the search box to learn more about \"Scrapes (Abrasions): Care Instructions. \"  Current as of: March 20, 2017  Content Version: 11.4  © 4096-6987 [employer]. Care instructions adapted under license by [employer] (which disclaims liability or warranty for this information). If you have questions about a medical condition or this instruction, always ask your healthcare professional. [place] [address] any warranty or liability for your use of this information. Head Injury: Care Instructions  Your Care Instructions    Most injuries to the head are minor. Bumps, cuts, and scrapes on the head and face usually heal well and can be treated the same as injuries to other parts of the body. Although it's rare, once in a while a more serious problem shows up after you are home. So it's good to be on the lookout for symptoms for a day or two. Follow-up care is a key part of your treatment and safety. Be sure to make and go to all appointments, and call your doctor if you are having problems. It's also a good idea to know your test results and keep a list of the medicines you take. How can you care for yourself at home? · Follow your doctor's instructions. He or she will tell you if you need someone to watch you closely for the next 24 hours or longer. · Take it easy for the next few days or more if you are not feeling well.   · Ask your doctor when it's okay for you to go back to activities like driving a car, riding a bike, or operating machinery. When should you call for help? Call 911 anytime you think you may need emergency care. For example, call if:  ? · You have a seizure. ? · You passed out (lost consciousness). ? · You are confused or can't stay awake. ?Call your doctor now or seek immediate medical care if:  ? · You have new or worse vomiting. ? · You feel less alert. ? · You have new weakness or numbness in any part of your body. ? Watch closely for changes in your health, and be sure to contact your doctor if:  ? · You do not get better as expected. ? · You have new symptoms, such as headaches, trouble concentrating, or changes in mood. Where can you learn more? Go to http://rylee-harvey.info/. Enter F837 in the search box to learn more about \"Head Injury: Care Instructions. \"  Current as of: October 14, 2016  Content Version: 11.4  © 8769-8859 [employer]. Care instructions adapted under license by [employer] (which disclaims liability or warranty for this information). If you have questions about a medical condition or this instruction, always ask your healthcare professional. [place] [address] any warranty or liability for your use of this information. Preventing Falls: Care Instructions  Your Care Instructions    Getting around your home safely can be a challenge if you have injuries or health problems that make it easy for you to fall. Loose rugs and furniture in walkways are among the dangers for many older people who have problems walking or who have poor eyesight. People who have conditions such as arthritis, osteoporosis, or dementia also have to be careful not to fall. You can make your home safer with a few simple measures. Follow-up care is a key part of your treatment and safety. Be sure to make and go to all appointments, and call your doctor if you are having problems.  It's also a good idea to know your test results and keep a list of the medicines you take. How can you care for yourself at home? Taking care of yourself  · You may get dizzy if you do not drink enough water. To prevent dehydration, drink plenty of fluids, enough so that your urine is light yellow or clear like water. Choose water and other caffeine-free clear liquids. If you have kidney, heart, or liver disease and have to limit fluids, talk with your doctor before you increase the amount of fluids you drink. · Exercise regularly to improve your strength, muscle tone, and balance. Walk if you can. Swimming may be a good choice if you cannot walk easily. · Have your vision and hearing checked each year or any time you notice a change. If you have trouble seeing and hearing, you might not be able to avoid objects and could lose your balance. · Know the side effects of the medicines you take. Ask your doctor or pharmacist whether the medicines you take can affect your balance. Sleeping pills or sedatives can affect your balance. · Limit the amount of alcohol you drink. Alcohol can impair your balance and other senses. · Ask your doctor whether calluses or corns on your feet need to be removed. If you wear loose-fitting shoes because of calluses or corns, you can lose your balance and fall. · Talk to your doctor if you have numbness in your feet. Preventing falls at home  · Remove raised doorway thresholds, throw rugs, and clutter. Repair loose carpet or raised areas in the floor. · Move furniture and electrical cords to keep them out of walking paths. · Use nonskid floor wax, and wipe up spills right away, especially on ceramic tile floors. · If you use a walker or cane, put rubber tips on it. If you use crutches, clean the bottoms of them regularly with an abrasive pad, such as steel wool. · Keep your house well lit, especially [facility], and outside walkways. Use night-lights in areas such as hallways and bathrooms.  Add extra light switches or use remote switches (such as switches that go on or off when you clap your hands) to make it easier to turn lights on if you have to get up during the night. · Install sturdy handrails on stairways. · Move items in your cabinets so that the things you use a lot are on the lower shelves (about waist level). · Keep a cordless phone and a flashlight with new batteries by your bed. If possible, put a phone in each of the main rooms of your house, or carry a cell phone in case you fall and cannot reach a phone. Or, you can wear a device around your neck or wrist. You push a button that sends a signal for help. · Wear low-heeled shoes that fit well and give your feet good support. Use footwear with nonskid soles. Check the heels and soles of your shoes for wear. Repair or replace worn heels or soles. · Do not wear socks without shoes on wood floors. · Walk on the grass when the sidewalks are slippery. If you live in an area that gets snow and ice in the winter, sprinkle salt on slippery steps and sidewalks. Preventing falls in the bath  · Install grab bars and nonskid mats inside and outside your shower or tub and near the toilet and sinks. · Use shower chairs and bath benches. · Use a hand-held shower head that will allow you to sit while showering. · Get into a tub or shower by putting the weaker leg in first. Get out of a tub or shower with your strong side first.  · Repair loose toilet seats and consider installing a raised toilet seat to make getting on and off the toilet easier. · Keep your bathroom door unlocked while you are in the shower. Where can you learn more? Go to http://rylee-harvey.info/. Enter [id number] [id number] in the search box to learn more about \"Preventing Falls: Care Instructions. \"  Current as of: May 12, 2017  Content Version: 11.4  © 8398-7433 [employer].  Care instructions adapted under license by [employer] (which disclaims liability or warranty for this information). If you have questions about a medical condition or this instruction, always ask your healthcare professional. [place] [address] any warranty or liability for your use of this information.

## 2018-08-11 ENCOUNTER — HOSPITAL ENCOUNTER (EMERGENCY)
Age: 68
Discharge: HOME | End: 2018-08-11
Attending: EMERGENCY MEDICINE
Payer: MEDICARE

## 2018-08-11 ENCOUNTER — APPOINTMENT (OUTPATIENT)
Dept: GENERAL RADIOLOGY | Age: 68
End: 2018-08-11
Attending: EMERGENCY MEDICINE
Payer: MEDICARE

## 2018-08-11 VITALS
SYSTOLIC BLOOD PRESSURE: 100 MMHG | WEIGHT: 100 LBS | TEMPERATURE: 97.8 F | HEIGHT: 60 IN | HEART RATE: 60 BPM | OXYGEN SATURATION: 100 % | BODY MASS INDEX: 19.63 KG/M2 | DIASTOLIC BLOOD PRESSURE: 66 MMHG | RESPIRATION RATE: 16 BRPM

## 2018-08-11 DIAGNOSIS — M25.552 PAIN OF LEFT HIP JOINT: Primary | ICD-10-CM

## 2018-08-11 DIAGNOSIS — E78.5 HYPERLIPIDEMIA, UNSPECIFIED HYPERLIPIDEMIA TYPE: ICD-10-CM

## 2018-08-11 DIAGNOSIS — I10 ESSENTIAL HYPERTENSION: ICD-10-CM

## 2018-08-11 DIAGNOSIS — G20 PARKINSON'S DISEASE (HCC): ICD-10-CM
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
## 2018-08-11 PROCEDURE — 73502 X-RAY EXAM HIP UNI 2-3 VIEWS: CPT

## 2018-08-11 PROCEDURE — 99284 EMERGENCY DEPT VISIT MOD MDM: CPT | Performed by: EMERGENCY MEDICINE

## 2018-08-11 NOTE — DISCHARGE INSTRUCTIONS
Parkinson's Disease: Care Instructions  Your Care Instructions  Parkinson's disease can cause tremors, stiffness, and problems with movement. Severe or advanced cases can also cause problems with thinking. In Parkinson's disease, part of the brain cannot make enough dopamine, a chemical that helps control movement. Taking your medicines correctly and getting regular exercise may help you maintain your quality of life. There are many things that can cause Parkinson's disease symptoms, including some medicine, some toxins, and trauma to the head. The cause in most cases is not known. Follow-up care is a key part of your treatment and safety. Be sure to make and go to all appointments, and call your doctor if you are having problems. It's also a good idea to know your test results and keep a list of the medicines you take. How can you care for yourself at home? General care  · Take your medicines exactly as prescribed. Call your doctor if you think you are having a problem with your medicine. · Make sure your home is safe:  ¨ Place furniture so that you have something to hold on to as you walk around the house. ¨ Use chairs that make it easier to sit down and stand up. ¨ Group the things you use most, such as reading glasses, keys, and the telephone, in one easy-to-reach place. ¨ Tack down rugs so that you do not trip. ¨ Put no-slip tape and handrails in the tub to prevent falls. · Use a cane, walker, or scooter if your doctor suggests it. · Keep up your normal activities as much as you can. · Find ways to manage stress, which can make symptoms worse. · Spend time with family and friends. Join a support group for people with Parkinson's disease if you want extra help. · Depression is common with this condition. Tell your doctor if you have trouble sleeping, are eating too much or are not hungry, or feel sad or tearful all the time. Depression can be treated with medicine and counseling.   Diet and exercise  · Eat a balanced diet. · If you are taking levodopa, do not eat protein at the same time you take your medicine. Levodopa may not work as well if you take it at the same time you eat protein. You can eat normal amounts of protein. Talk to your doctor if you have questions. · If you have problems swallowing, change how and what you eat:  ¨ Try thick drinks, such as milk shakes. They are easier to swallow than other fluids. ¨ Do not eat foods that crumble easily. These can cause choking. ¨ Use a  to prepare food. Soft foods need less chewing. ¨ Eat small meals often so that you do not get tired from eating heavy meals. · Drink plenty of water and eat a high-fiber diet to prevent constipation. Parkinson's-and the medicines that treat it-may slow your intestines. · Get exercise on most days. Work with your doctor to set up a program of walking, swimming, or other exercise you are able to do. When should you call for help? Call your doctor now or seek immediate medical care if:    · You have a change in your symptoms.     · You develop other problems from your condition, such as:  ¨ Injury from a fall. ¨ Thinking or memory problems. ¨ A urinary tract infection (burning pain when urinating).    Watch closely for changes in your health, and be sure to contact your doctor if:    · You lose weight because of problems with eating.     · You want more information about your condition or your medicines. Where can you learn more? Go to http://rylee-harvey.info/. Enter B691 in the search box to learn more about \"Parkinson's Disease: Care Instructions. \"  Current as of: October 9, 2017  Content Version: 11.7  © 4076-9765 [employer]. Care instructions adapted under license by [employer] (which disclaims liability or warranty for this information).  If you have questions about a medical condition or this instruction, always ask your healthcare professional. [employer] disclaims any warranty or liability for your use of this information.

## 2018-08-11 NOTE — ED NOTES
I have reviewed discharge instructions with the patient. The patient verbalized understanding. Patient left ED via Discharge Method:naya to Home with naya. Opportunity for questions and clarification provided. Patient given 0 scripts. To continue your aftercare when you leave the hospital, you may receive an automated call from our care team to check in on how you are doing. This is a free service and part of our promise to provide the best care and service to meet your aftercare needs.  If you have questions, or wish to unsubscribe from this service please call [phone]. Thank you for Choosing our [facility] Emergency Department.

## 2018-08-11 NOTE — ED TRIAGE NOTES
Pt arrives to the via ems per, pt fell off the bed, per ems pt has as h/x of HTN afib, pt is c/o left hip pain.

## 2018-08-11 NOTE — ED PROVIDER NOTES
HPI Comments: Per EMS and patient, patient fell out of bed onto the floor. She was complaining of left hip pain and was brought here by EMS. She lives at home with her elderly  who cares for her. She has advanced Parkinson's disease. .   No other family member is available, no other history is able. Patient states she fell \"because it seemed like the thing to do at the time\". Elements of this note were created using speech recognition software. As such, errors of speech recognition may be present. Patient is a 75 y.o. female presenting with fall. The history is provided by the patient. Fall          Past Medical History:   Diagnosis Date    Arrhythmia 7/14/2016    Atrial fibrillation (HCC) 8/24/2015    Dry eye     Heart disease 7/14/2016    High blood pressure 7/14/2016    History of basal cell cancer 7/14/2016    Hyperlipidemia 7/14/2016    Hypertension 7/14/2016    Presence of cardiac pacemaker 8/24/2015    Tremor 7/14/2016       Past Surgical History:   Procedure Laterality Date    HX COLONOSCOPY  12/2011    HX HEART CATHETERIZATION      HX OTHER SURGICAL      Cardiac Electrophysiology Mapping and Ablation    HX OTHER SURGICAL      Lung Biopsy    HX OTHER SURGICAL  1982    skin cancer excision, basal cell carcinoma on forehead    HX OTHER SURGICAL  6/12    Cardiac pacemaker placement    HX PARTIAL HYSTERECTOMY      Abdominal     HX SALPINGO-OOPHORECTOMY      Laparoscopic, with MARIA ELENA    HX TONSILLECTOMY           Family History:   Problem Relation Age of Onset    Heart Disease Mother     Thyroid Disease Mother     Heart Disease Father     Cancer Father      Lung Cancer       Social History     Social History    Marital status:      Spouse name: N/A    Number of children: N/A    Years of education: N/A     Occupational History    Not on file.      Social History Main Topics    Smoking status: Never Smoker    Smokeless tobacco: Never Used    Alcohol use No    Drug use: No    Sexual activity: Not on file     Other Topics Concern    Not on file     Social History Narrative         ALLERGIES: Codeine; Penicillins; and Sulfa (sulfonamide antibiotics)    Review of Systems   Unable to perform ROS: Dementia       Vitals:    08/11/18 1609   BP: 102/61   Pulse: 65   Resp: 16   Temp: 97.8 °F (36.6 °C)   SpO2: 97%   Weight: 45.4 kg (100 lb)   Height: 5' (1.524 m)            Physical Exam   Constitutional: She appears well-developed and well-nourished. Elderly appearing white female with repetitive jerking movements of her upper arms consistent with Parkinson's disease   HENT:   Head: Normocephalic and atraumatic. Eyes: Conjunctivae are normal. Pupils are equal, round, and reactive to light. Neck: Normal range of motion. Neck supple. Musculoskeletal: She exhibits no edema or tenderness. Neurological: She is alert. Skin: Skin is warm and dry. Psychiatric: She has a normal mood and affect. Her behavior is normal.   Nursing note and vitals reviewed.        MDM  Number of Diagnoses or Management Options  Essential hypertension:   Fall, initial encounter:   Hyperlipidemia, unspecified hyperlipidemia type:   Pain of left hip joint:   Parkinson's disease [facility]):   Diagnosis management comments: 5:23 PM discussed normal x-rays with patient       Amount and/or Complexity of Data Reviewed  Tests in the radiology section of CPT®: ordered and reviewed    Risk of Complications, Morbidity, and/or Mortality  Presenting problems: low  Diagnostic procedures: low  Management options: low    Patient Progress  Patient progress: stable        ED Course       Procedures

## 2018-09-16 ENCOUNTER — APPOINTMENT (OUTPATIENT)
Dept: GENERAL RADIOLOGY | Age: 68
DRG: 871 | End: 2018-09-16
Attending: EMERGENCY MEDICINE
Payer: MEDICARE

## 2018-09-16 ENCOUNTER — HOSPITAL ENCOUNTER (INPATIENT)
Age: 68
LOS: 5 days | Discharge: NURSING, REHAB OR LONG-TERM CARE | DRG: 871 | End: 2018-09-21
Attending: EMERGENCY MEDICINE | Admitting: INTERNAL MEDICINE
Payer: MEDICARE

## 2018-09-16 DIAGNOSIS — A41.9 SEPSIS, DUE TO UNSPECIFIED ORGANISM: Primary | ICD-10-CM

## 2018-09-16 DIAGNOSIS — G20 DEMENTIA DUE TO PARKINSON'S DISEASE WITH BEHAVIORAL DISTURBANCE (HCC): ICD-10-CM

## 2018-09-16 DIAGNOSIS — G20 PARKINSON DISEASE (HCC): ICD-10-CM

## 2018-09-16 DIAGNOSIS — L89.159 DECUBITUS ULCER OF SACRAL REGION, UNSPECIFIED ULCER STAGE: ICD-10-CM

## 2018-09-16 DIAGNOSIS — F02.818 DEMENTIA DUE TO PARKINSON'S DISEASE WITH BEHAVIORAL DISTURBANCE (HCC): ICD-10-CM

## 2018-09-16 PROBLEM — G93.41 METABOLIC ENCEPHALOPATHY: Status: ACTIVE | Noted: 2018-09-16

## 2018-09-16 PROBLEM — L98.429 SACRAL ULCER (HCC): Status: ACTIVE | Noted: 2018-09-16

## 2018-09-16 LAB
ALBUMIN SERPL-MCNC: 2.6 G/DL (ref 3.2–4.6)
ALBUMIN/GLOB SERPL: 0.8 {RATIO} (ref 1.2–3.5)
ALP SERPL-CCNC: 96 U/L (ref 50–136)
ALT SERPL-CCNC: 10 U/L (ref 12–65)
ANION GAP SERPL CALC-SCNC: 8 MMOL/L (ref 7–16)
AST SERPL-CCNC: 33 U/L (ref 15–37)
BASOPHILS # BLD: 0 K/UL (ref 0–0.2)
BASOPHILS NFR BLD: 0 % (ref 0–2)
BILIRUB SERPL-MCNC: 0.3 MG/DL (ref 0.2–1.1)
BUN SERPL-MCNC: 31 MG/DL (ref 8–23)
CALCIUM SERPL-MCNC: 8.7 MG/DL (ref 8.3–10.4)
CHLORIDE SERPL-SCNC: 102 MMOL/L (ref 98–107)
CO2 SERPL-SCNC: 28 MMOL/L (ref 21–32)
CREAT SERPL-MCNC: 0.71 MG/DL (ref 0.6–1)
DIFFERENTIAL METHOD BLD: ABNORMAL
EOSINOPHIL # BLD: 0 K/UL (ref 0–0.8)
EOSINOPHIL NFR BLD: 0 % (ref 0.5–7.8)
ERYTHROCYTE [DISTWIDTH] IN BLOOD BY AUTOMATED COUNT: 15.8 %
GLOBULIN SER CALC-MCNC: 3.3 G/DL (ref 2.3–3.5)
GLUCOSE SERPL-MCNC: 139 MG/DL (ref 65–100)
HCT VFR BLD AUTO: 31.4 % (ref 35.8–46.3)
HGB BLD-MCNC: 10 G/DL (ref 11.7–15.4)
IMM GRANULOCYTES # BLD: 0.1 K/UL (ref 0–0.5)
IMM GRANULOCYTES NFR BLD AUTO: 1 % (ref 0–5)
INR PPP: 4.7
LACTATE BLD-SCNC: 2.1 MMOL/L (ref 0.5–1.9)
LACTATE BLD-SCNC: 2.6 MMOL/L (ref 0.5–1.9)
LYMPHOCYTES # BLD: 1.4 K/UL (ref 0.5–4.6)
LYMPHOCYTES NFR BLD: 10 % (ref 13–44)
MAGNESIUM SERPL-MCNC: 2.1 MG/DL (ref 1.8–2.4)
MCH RBC QN AUTO: 29.3 PG (ref 26.1–32.9)
MCHC RBC AUTO-ENTMCNC: 31.8 G/DL (ref 31.4–35)
MCV RBC AUTO: 92.1 FL (ref 79.6–97.8)
MONOCYTES # BLD: 1.1 K/UL (ref 0.1–1.3)
MONOCYTES NFR BLD: 8 % (ref 4–12)
NEUTS SEG # BLD: 11.4 K/UL (ref 1.7–8.2)
NEUTS SEG NFR BLD: 82 % (ref 43–78)
NRBC # BLD: 0 K/UL (ref 0–0.2)
PLATELET # BLD AUTO: 379 K/UL (ref 150–450)
PMV BLD AUTO: 9.1 FL (ref 9.4–12.3)
POTASSIUM SERPL-SCNC: 4.7 MMOL/L (ref 3.5–5.1)
PROT SERPL-MCNC: 5.9 G/DL (ref 6.3–8.2)
PROTHROMBIN TIME: 42.1 SEC (ref 11.5–14.5)
RBC # BLD AUTO: 3.41 M/UL (ref 4.05–5.2)
SODIUM SERPL-SCNC: 138 MMOL/L (ref 136–145)
TROPONIN I BLD-MCNC: 0.02 NG/ML (ref 0.02–0.05)
WBC # BLD AUTO: 14 K/UL (ref 4.3–11.1)

## 2018-09-16 PROCEDURE — 71045 X-RAY EXAM CHEST 1 VIEW: CPT

## 2018-09-16 PROCEDURE — 93005 ELECTROCARDIOGRAM TRACING: CPT | Performed by: EMERGENCY MEDICINE

## 2018-09-16 PROCEDURE — 96367 TX/PROPH/DG ADDL SEQ IV INF: CPT | Performed by: EMERGENCY MEDICINE

## 2018-09-16 PROCEDURE — 96375 TX/PRO/DX INJ NEW DRUG ADDON: CPT | Performed by: EMERGENCY MEDICINE

## 2018-09-16 PROCEDURE — 87040 BLOOD CULTURE FOR BACTERIA: CPT

## 2018-09-16 PROCEDURE — 65270000029 HC RM PRIVATE

## 2018-09-16 PROCEDURE — 85025 COMPLETE CBC W/AUTO DIFF WBC: CPT

## 2018-09-16 PROCEDURE — 87804 INFLUENZA ASSAY W/OPTIC: CPT

## 2018-09-16 PROCEDURE — 96361 HYDRATE IV INFUSION ADD-ON: CPT | Performed by: EMERGENCY MEDICINE

## 2018-09-16 PROCEDURE — 74011000258 HC RX REV CODE- 258: Performed by: INTERNAL MEDICINE

## 2018-09-16 PROCEDURE — 80053 COMPREHEN METABOLIC PANEL: CPT

## 2018-09-16 PROCEDURE — 84484 ASSAY OF TROPONIN QUANT: CPT

## 2018-09-16 PROCEDURE — 74011250636 HC RX REV CODE- 250/636: Performed by: EMERGENCY MEDICINE

## 2018-09-16 PROCEDURE — 83605 ASSAY OF LACTIC ACID: CPT

## 2018-09-16 PROCEDURE — 74011000258 HC RX REV CODE- 258: Performed by: EMERGENCY MEDICINE

## 2018-09-16 PROCEDURE — 74011250636 HC RX REV CODE- 250/636: Performed by: INTERNAL MEDICINE

## 2018-09-16 PROCEDURE — 81003 URINALYSIS AUTO W/O SCOPE: CPT | Performed by: EMERGENCY MEDICINE

## 2018-09-16 PROCEDURE — 83735 ASSAY OF MAGNESIUM: CPT

## 2018-09-16 PROCEDURE — 99285 EMERGENCY DEPT VISIT HI MDM: CPT | Performed by: EMERGENCY MEDICINE

## 2018-09-16 PROCEDURE — 85610 PROTHROMBIN TIME: CPT

## 2018-09-16 PROCEDURE — 74011000250 HC RX REV CODE- 250: Performed by: INTERNAL MEDICINE

## 2018-09-16 PROCEDURE — 96365 THER/PROPH/DIAG IV INF INIT: CPT | Performed by: EMERGENCY MEDICINE

## 2018-09-16 RX ORDER — DILTIAZEM HYDROCHLORIDE 5 MG/ML
15 INJECTION INTRAVENOUS
Status: DISCONTINUED | OUTPATIENT
Start: 2018-09-16 | End: 2018-09-16

## 2018-09-16 RX ORDER — SODIUM CHLORIDE 0.9 % (FLUSH) 0.9 %
5-10 SYRINGE (ML) INJECTION AS NEEDED
Status: DISCONTINUED | OUTPATIENT
Start: 2018-09-16 | End: 2018-09-17 | Stop reason: SDUPTHER

## 2018-09-16 RX ORDER — LORAZEPAM 2 MG/ML
1 INJECTION INTRAMUSCULAR
Status: COMPLETED | OUTPATIENT
Start: 2018-09-16 | End: 2018-09-16

## 2018-09-16 RX ORDER — SODIUM CHLORIDE 0.9 % (FLUSH) 0.9 %
5-10 SYRINGE (ML) INJECTION EVERY 8 HOURS
Status: DISCONTINUED | OUTPATIENT
Start: 2018-09-16 | End: 2018-09-17 | Stop reason: SDUPTHER

## 2018-09-16 RX ADMIN — LORAZEPAM 1 MG: 2 INJECTION INTRAMUSCULAR; INTRAVENOUS at 23:17

## 2018-09-16 RX ADMIN — SODIUM CHLORIDE 1000 ML: 900 INJECTION, SOLUTION INTRAVENOUS at 20:20

## 2018-09-16 RX ADMIN — CLINDAMYCIN PHOSPHATE 600 MG: 150 INJECTION, SOLUTION INTRAVENOUS at 22:30

## 2018-09-16 RX ADMIN — CEFTRIAXONE SODIUM 1 G: 1 INJECTION, POWDER, FOR SOLUTION INTRAMUSCULAR; INTRAVENOUS at 21:36

## 2018-09-16 NOTE — ED TRIAGE NOTES
Pt reports hurting all over . Pt reports bad tremors from parkinson . Pt reports chest pain when pressed on . Pt meds were swap around at hospice care 2 weeks ago

## 2018-09-16 NOTE — ED PROVIDER NOTES
HPI Comments: 60-year-old lady presents with concerns from home health care and hospice that she may have a fever. Friends also noted that the , who is apparently debilitated and wheelchair bound, also noted that the patient has been more agitated and anxious over the last 24 hours. They report that been very fidgety and has not been as interactive as usual. 
 
She does have a history of Parkinson's disease and at baseline has some dementia. The friends who visit her daily say that her Parkinson symptoms and her confusion have gotten worse. He also thought that she felt very warm. They deny her having any recent vomiting or diarrhea and no cough. Friends also expressed concerns that the patient may not be getting her medications away she should be because her tremors do seem to be more course than usual.  Also note that she has become very cachectic and do not think that she is able to get any adequate nutrition. Elements of this note were created using speech recognition software. As such, errors of speech recognition may be present. The history is provided by the patient and a friend. Past Medical History:  
Diagnosis Date  Arrhythmia 7/14/2016  Atrial fibrillation ([address] 8/24/2015  Dry eye  Heart disease 7/14/2016  High blood pressure 7/14/2016  History of basal cell cancer 7/14/2016  Hyperlipidemia 7/14/2016  Hypertension 7/14/2016  Presence of cardiac pacemaker 8/24/2015  Tremor 7/14/2016 Past Surgical History:  
Procedure Laterality Date  HX COLONOSCOPY  12/2011  HX HEART CATHETERIZATION    
 HX OTHER SURGICAL Cardiac Electrophysiology Mapping and Ablation  HX OTHER SURGICAL Lung Biopsy  HX OTHER SURGICAL  1982  
 skin cancer excision, basal cell carcinoma on forehead  HX OTHER SURGICAL  6/12 Cardiac pacemaker placement  HX PARTIAL HYSTERECTOMY Abdominal   
 HX SALPINGO-OOPHORECTOMY Laparoscopic, with MARIA ELENA  
 HX TONSILLECTOMY Family History:  
Problem Relation Age of Onset  Heart Disease Mother  Thyroid Disease Mother  Heart Disease Father  Cancer Father Lung Cancer Social History Social History  Marital status:  Spouse name: N/A  
 Number of children: N/A  
 Years of education: N/A Occupational History  Not on file. Social History Main Topics  Smoking status: Never Smoker  Smokeless tobacco: Never Used  Alcohol use No  
 Drug use: No  
 Sexual activity: Not on file Other Topics Concern  Not on file Social History Narrative ALLERGIES: Codeine; Penicillins; and Sulfa (sulfonamide antibiotics) Review of Systems Constitutional: Positive for activity change, appetite change, fatigue and unexpected weight change. Negative for chills, diaphoresis and fever. HENT: Negative for congestion, rhinorrhea and sore throat. Eyes: Negative for redness and visual disturbance. Respiratory: Negative for cough, chest tightness, shortness of breath and wheezing. Cardiovascular: Negative for chest pain and palpitations. Gastrointestinal: Negative for abdominal pain, blood in stool, diarrhea, nausea and vomiting. Endocrine: Negative for polydipsia and polyuria. Genitourinary: Negative for dysuria and hematuria. Musculoskeletal: Negative for arthralgias, myalgias and neck stiffness. Skin: Negative for rash. Allergic/Immunologic: Negative for environmental allergies and food allergies. Neurological: Negative for dizziness, weakness and headaches. Hematological: Negative for adenopathy. Does not bruise/bleed easily. Psychiatric/Behavioral: Positive for agitation and confusion. Negative for sleep disturbance. The patient is not nervous/anxious. Vitals:  
 09/16/18 1918 09/16/18 1929 BP: 134/62 Pulse:  (!) 145 Resp: 20 Temp: 99.4 °F (37.4 °C) SpO2: (!) 85% 95% Weight: 45.4 kg (100 lb) Height: 5' (1.524 m) Physical Exam  
Constitutional: She appears well-developed and well-nourished. Very thin lady who looks much older than stated age HENT:  
Head: Normocephalic and atraumatic. Eyes: Conjunctivae and EOM are normal. Pupils are equal, round, and reactive to light. Right eye exhibits no discharge. Left eye exhibits no discharge. Neck: Normal range of motion. No tracheal deviation present. Cardiovascular: Normal rate. Exam reveals no gallop and no friction rub. Patient heart rate initially very tachycardic with an irregularly irregular rhythm Pulmonary/Chest: Effort normal and breath sounds normal. No respiratory distress. She has no wheezes. She has no rales. She exhibits no tenderness. Abdominal: Soft. Bowel sounds are normal. She exhibits no distension. There is no tenderness. There is no rebound and no guarding. Musculoskeletal: Normal range of motion. She exhibits no edema or tenderness. Lymphadenopathy:  
  She has no cervical adenopathy. Neurological: She is alert. Patient is awake and will answer some questions appropriate but others she is confused about. She did not know the month or the year. Skin: Skin is warm and dry. atient had a 3 cm diameter decubitus ulcer that had some small amounts of pus and surrounding erythema Psychiatric: She has a normal mood and affect. Nursing note and vitals reviewed. MDM Number of Diagnoses or Management Options Diagnosis management comments: Patient's initial heart rate was 140-160 with what appeared to be A. Fib. She then quickly spontaneously converted to a normal sinus rhythm with a rate in the 
 
Given her elevated lactic acid and elevated white count I am concerned for a developing sepsis. The source may be her decubitus ulcers since her x-ray and her urine appeared to be unremarkable. Given her penicillin allergy I will treat her with some Rocephin. 9:46 PM 
I discussed the case with the hospitalist who kindly agreed to see the patient. ED Course Procedures

## 2018-09-17 ENCOUNTER — APPOINTMENT (OUTPATIENT)
Dept: CT IMAGING | Age: 68
DRG: 871 | End: 2018-09-17
Attending: INTERNAL MEDICINE
Payer: MEDICARE

## 2018-09-17 PROBLEM — L89.153 PRESSURE ULCER OF SACRAL REGION, STAGE 3 (HCC): Status: ACTIVE | Noted: 2018-09-16

## 2018-09-17 PROBLEM — R53.81 DEBILITY: Status: ACTIVE | Noted: 2018-09-17

## 2018-09-17 LAB
ALBUMIN SERPL-MCNC: 2.3 G/DL (ref 3.2–4.6)
ALBUMIN/GLOB SERPL: 0.9 {RATIO} (ref 1.2–3.5)
ALP SERPL-CCNC: 80 U/L (ref 50–136)
ALT SERPL-CCNC: 7 U/L (ref 12–65)
ANION GAP SERPL CALC-SCNC: 8 MMOL/L (ref 7–16)
AST SERPL-CCNC: 29 U/L (ref 15–37)
ATRIAL RATE: 58 BPM
ATRIAL RATE: 80 BPM
BILIRUB SERPL-MCNC: 0.3 MG/DL (ref 0.2–1.1)
BUN SERPL-MCNC: 24 MG/DL (ref 8–23)
CALCIUM SERPL-MCNC: 7.9 MG/DL (ref 8.3–10.4)
CALCULATED P AXIS, ECG09: 71 DEGREES
CALCULATED P AXIS, ECG09: 84 DEGREES
CALCULATED R AXIS, ECG10: -10 DEGREES
CALCULATED R AXIS, ECG10: 68 DEGREES
CALCULATED T AXIS, ECG11: 51 DEGREES
CALCULATED T AXIS, ECG11: 78 DEGREES
CHLORIDE SERPL-SCNC: 106 MMOL/L (ref 98–107)
CO2 SERPL-SCNC: 27 MMOL/L (ref 21–32)
CREAT SERPL-MCNC: 0.31 MG/DL (ref 0.6–1)
DIAGNOSIS, 93000: NORMAL
DIAGNOSIS, 93000: NORMAL
ERYTHROCYTE [DISTWIDTH] IN BLOOD BY AUTOMATED COUNT: 15.9 %
FLUAV AG NPH QL IA: NEGATIVE
FLUBV AG NPH QL IA: NEGATIVE
GLOBULIN SER CALC-MCNC: 2.6 G/DL (ref 2.3–3.5)
GLUCOSE SERPL-MCNC: 82 MG/DL (ref 65–100)
HCT VFR BLD AUTO: 27.6 % (ref 35.8–46.3)
HGB BLD-MCNC: 8.6 G/DL (ref 11.7–15.4)
INR PPP: 4.9
LACTATE SERPL-SCNC: 0.5 MMOL/L (ref 0.4–2)
MAGNESIUM SERPL-MCNC: 2.2 MG/DL (ref 1.8–2.4)
MCH RBC QN AUTO: 29.3 PG (ref 26.1–32.9)
MCHC RBC AUTO-ENTMCNC: 31.2 G/DL (ref 31.4–35)
MCV RBC AUTO: 93.9 FL (ref 79.6–97.8)
NRBC # BLD: 0 K/UL (ref 0–0.2)
P-R INTERVAL, ECG05: 150 MS
P-R INTERVAL, ECG05: 162 MS
PLATELET # BLD AUTO: 285 K/UL (ref 150–450)
PMV BLD AUTO: 9.1 FL (ref 9.4–12.3)
POTASSIUM SERPL-SCNC: 4 MMOL/L (ref 3.5–5.1)
PROT SERPL-MCNC: 4.9 G/DL (ref 6.3–8.2)
PROTHROMBIN TIME: 43.5 SEC (ref 11.5–14.5)
Q-T INTERVAL, ECG07: 384 MS
Q-T INTERVAL, ECG07: 412 MS
QRS DURATION, ECG06: 78 MS
QRS DURATION, ECG06: 90 MS
QTC CALCULATION (BEZET), ECG08: 404 MS
QTC CALCULATION (BEZET), ECG08: 442 MS
RBC # BLD AUTO: 2.94 M/UL (ref 4.05–5.2)
SODIUM SERPL-SCNC: 141 MMOL/L (ref 136–145)
SPECIMEN SOURCE: NORMAL
TSH SERPL DL<=0.005 MIU/L-ACNC: 0.29 UIU/ML (ref 0.36–3.74)
VENTRICULAR RATE, ECG03: 58 BPM
VENTRICULAR RATE, ECG03: 80 BPM
WBC # BLD AUTO: 7.3 K/UL (ref 4.3–11.1)

## 2018-09-17 PROCEDURE — 74011000258 HC RX REV CODE- 258: Performed by: INTERNAL MEDICINE

## 2018-09-17 PROCEDURE — 74011250637 HC RX REV CODE- 250/637: Performed by: INTERNAL MEDICINE

## 2018-09-17 PROCEDURE — 77030020253 HC SOL INJ D545NS .05 DEX .45 SAL

## 2018-09-17 PROCEDURE — 87106 FUNGI IDENTIFICATION YEAST: CPT

## 2018-09-17 PROCEDURE — 93005 ELECTROCARDIOGRAM TRACING: CPT | Performed by: INTERNAL MEDICINE

## 2018-09-17 PROCEDURE — 74011250636 HC RX REV CODE- 250/636: Performed by: INTERNAL MEDICINE

## 2018-09-17 PROCEDURE — 83735 ASSAY OF MAGNESIUM: CPT

## 2018-09-17 PROCEDURE — 74011000250 HC RX REV CODE- 250: Performed by: PHYSICIAN ASSISTANT

## 2018-09-17 PROCEDURE — 72193 CT PELVIS W/DYE: CPT

## 2018-09-17 PROCEDURE — 74011000250 HC RX REV CODE- 250: Performed by: INTERNAL MEDICINE

## 2018-09-17 PROCEDURE — 65270000029 HC RM PRIVATE

## 2018-09-17 PROCEDURE — 77030032490 HC SLV COMPR SCD KNE COVD -B

## 2018-09-17 PROCEDURE — 80053 COMPREHEN METABOLIC PANEL: CPT

## 2018-09-17 PROCEDURE — 85027 COMPLETE CBC AUTOMATED: CPT

## 2018-09-17 PROCEDURE — 83605 ASSAY OF LACTIC ACID: CPT

## 2018-09-17 PROCEDURE — 99222 1ST HOSP IP/OBS MODERATE 55: CPT | Performed by: NURSE PRACTITIONER

## 2018-09-17 PROCEDURE — 84443 ASSAY THYROID STIM HORMONE: CPT

## 2018-09-17 PROCEDURE — 87077 CULTURE AEROBIC IDENTIFY: CPT

## 2018-09-17 PROCEDURE — 87186 SC STD MICRODIL/AGAR DIL: CPT

## 2018-09-17 PROCEDURE — 77030019605

## 2018-09-17 PROCEDURE — 36415 COLL VENOUS BLD VENIPUNCTURE: CPT

## 2018-09-17 PROCEDURE — 85610 PROTHROMBIN TIME: CPT

## 2018-09-17 PROCEDURE — 74011636320 HC RX REV CODE- 636/320: Performed by: INTERNAL MEDICINE

## 2018-09-17 PROCEDURE — 87205 SMEAR GRAM STAIN: CPT

## 2018-09-17 RX ORDER — PHYTONADIONE 5 MG/1
10 TABLET ORAL
Status: COMPLETED | OUTPATIENT
Start: 2018-09-17 | End: 2018-09-17

## 2018-09-17 RX ORDER — SODIUM CHLORIDE 0.9 % (FLUSH) 0.9 %
5 SYRINGE (ML) INJECTION EVERY 8 HOURS
Status: DISCONTINUED | OUTPATIENT
Start: 2018-09-17 | End: 2018-09-21 | Stop reason: HOSPADM

## 2018-09-17 RX ORDER — LANOLIN ALCOHOL/MO/W.PET/CERES
3 CREAM (GRAM) TOPICAL
Status: DISCONTINUED | OUTPATIENT
Start: 2018-09-17 | End: 2018-09-17

## 2018-09-17 RX ORDER — LORAZEPAM 2 MG/ML
1 INJECTION INTRAMUSCULAR
Status: DISCONTINUED | OUTPATIENT
Start: 2018-09-17 | End: 2018-09-21 | Stop reason: HOSPADM

## 2018-09-17 RX ORDER — SODIUM CHLORIDE 0.9 % (FLUSH) 0.9 %
10 SYRINGE (ML) INJECTION
Status: COMPLETED | OUTPATIENT
Start: 2018-09-17 | End: 2018-09-17

## 2018-09-17 RX ORDER — MORPHINE SULFATE 2 MG/ML
2 INJECTION, SOLUTION INTRAMUSCULAR; INTRAVENOUS
Status: DISCONTINUED | OUTPATIENT
Start: 2018-09-17 | End: 2018-09-21 | Stop reason: HOSPADM

## 2018-09-17 RX ORDER — OXYCODONE HYDROCHLORIDE 5 MG/1
5 TABLET ORAL
Status: DISCONTINUED | OUTPATIENT
Start: 2018-09-17 | End: 2018-09-21 | Stop reason: HOSPADM

## 2018-09-17 RX ORDER — CARBIDOPA AND LEVODOPA 25; 100 MG/1; MG/1
2 TABLET, EXTENDED RELEASE ORAL
Status: DISCONTINUED | OUTPATIENT
Start: 2018-09-17 | End: 2018-09-21 | Stop reason: HOSPADM

## 2018-09-17 RX ORDER — SODIUM CHLORIDE 9 MG/ML
75 INJECTION, SOLUTION INTRAVENOUS CONTINUOUS
Status: DISCONTINUED | OUTPATIENT
Start: 2018-09-17 | End: 2018-09-17

## 2018-09-17 RX ORDER — SODIUM CHLORIDE 0.9 % (FLUSH) 0.9 %
5-10 SYRINGE (ML) INJECTION AS NEEDED
Status: DISCONTINUED | OUTPATIENT
Start: 2018-09-17 | End: 2018-09-21 | Stop reason: HOSPADM

## 2018-09-17 RX ORDER — CARBIDOPA AND LEVODOPA 25; 250 MG/1; MG/1
1 TABLET ORAL 4 TIMES DAILY
Status: DISCONTINUED | OUTPATIENT
Start: 2018-09-17 | End: 2018-09-21 | Stop reason: HOSPADM

## 2018-09-17 RX ORDER — GABAPENTIN 100 MG/1
100 CAPSULE ORAL 3 TIMES DAILY
Status: DISCONTINUED | OUTPATIENT
Start: 2018-09-17 | End: 2018-09-21 | Stop reason: HOSPADM

## 2018-09-17 RX ORDER — ONDANSETRON 2 MG/ML
4 INJECTION INTRAMUSCULAR; INTRAVENOUS
Status: DISCONTINUED | OUTPATIENT
Start: 2018-09-17 | End: 2018-09-21 | Stop reason: HOSPADM

## 2018-09-17 RX ORDER — METOPROLOL SUCCINATE 25 MG/1
25 TABLET, EXTENDED RELEASE ORAL DAILY
Status: DISCONTINUED | OUTPATIENT
Start: 2018-09-17 | End: 2018-09-21 | Stop reason: HOSPADM

## 2018-09-17 RX ORDER — ACETAMINOPHEN 325 MG/1
650 TABLET ORAL
Status: DISCONTINUED | OUTPATIENT
Start: 2018-09-17 | End: 2018-09-21 | Stop reason: HOSPADM

## 2018-09-17 RX ORDER — WARFARIN SODIUM 5 MG/1
5 TABLET ORAL SEE ADMIN INSTRUCTIONS
Status: DISCONTINUED | OUTPATIENT
Start: 2018-09-17 | End: 2018-09-19

## 2018-09-17 RX ORDER — DEXTROSE MONOHYDRATE AND SODIUM CHLORIDE 5; .45 G/100ML; G/100ML
75 INJECTION, SOLUTION INTRAVENOUS CONTINUOUS
Status: DISCONTINUED | OUTPATIENT
Start: 2018-09-17 | End: 2018-09-20

## 2018-09-17 RX ADMIN — METOPROLOL SUCCINATE 25 MG: 25 TABLET, EXTENDED RELEASE ORAL at 08:55

## 2018-09-17 RX ADMIN — Medication 5 ML: at 13:40

## 2018-09-17 RX ADMIN — DIATRIZOATE MEGLUMINE AND DIATRIZOATE SODIUM 15 ML: 660; 100 LIQUID ORAL; RECTAL at 16:33

## 2018-09-17 RX ADMIN — IOPAMIDOL 100 ML: 755 INJECTION, SOLUTION INTRAVENOUS at 19:02

## 2018-09-17 RX ADMIN — CARBIDOPA AND LEVODOPA 2 TABLET: 25; 100 TABLET, EXTENDED RELEASE ORAL at 01:32

## 2018-09-17 RX ADMIN — VANCOMYCIN HYDROCHLORIDE 1000 MG: 1 INJECTION, POWDER, LYOPHILIZED, FOR SOLUTION INTRAVENOUS at 10:55

## 2018-09-17 RX ADMIN — SODIUM CHLORIDE 1 G: 900 INJECTION, SOLUTION INTRAVENOUS at 22:12

## 2018-09-17 RX ADMIN — PHYTONADIONE 10 MG: 5 TABLET ORAL at 13:40

## 2018-09-17 RX ADMIN — CARBIDOPA AND LEVODOPA 1 TABLET: 25; 250 TABLET ORAL at 09:21

## 2018-09-17 RX ADMIN — FAMOTIDINE 20 MG: 10 INJECTION, SOLUTION INTRAVENOUS at 08:55

## 2018-09-17 RX ADMIN — Medication 10 ML: at 19:02

## 2018-09-17 RX ADMIN — Medication 5 ML: at 05:33

## 2018-09-17 RX ADMIN — Medication 5 ML: at 01:33

## 2018-09-17 RX ADMIN — DAKIN'S SOLUTION 0.125% (QUARTER STRENGTH): 0.12 SOLUTION at 15:48

## 2018-09-17 RX ADMIN — GABAPENTIN 100 MG: 100 CAPSULE ORAL at 16:33

## 2018-09-17 RX ADMIN — GABAPENTIN 100 MG: 100 CAPSULE ORAL at 22:13

## 2018-09-17 RX ADMIN — GABAPENTIN 100 MG: 100 CAPSULE ORAL at 08:55

## 2018-09-17 RX ADMIN — LORAZEPAM 1 MG: 2 INJECTION INTRAMUSCULAR; INTRAVENOUS at 17:40

## 2018-09-17 RX ADMIN — Medication 1 AMPULE: at 08:55

## 2018-09-17 RX ADMIN — CARBIDOPA AND LEVODOPA 2 TABLET: 25; 100 TABLET, EXTENDED RELEASE ORAL at 22:11

## 2018-09-17 RX ADMIN — DAKIN'S SOLUTION 0.125% (QUARTER STRENGTH): 0.12 SOLUTION at 19:39

## 2018-09-17 RX ADMIN — SODIUM CHLORIDE 100 ML: 900 INJECTION, SOLUTION INTRAVENOUS at 19:02

## 2018-09-17 RX ADMIN — Medication 5 ML: at 22:13

## 2018-09-17 RX ADMIN — SODIUM CHLORIDE 1 G: 900 INJECTION, SOLUTION INTRAVENOUS at 08:55

## 2018-09-17 RX ADMIN — CARBIDOPA AND LEVODOPA 1 TABLET: 25; 250 TABLET ORAL at 13:40

## 2018-09-17 RX ADMIN — CARBIDOPA AND LEVODOPA 1 TABLET: 25; 250 TABLET ORAL at 17:05

## 2018-09-17 RX ADMIN — SODIUM CHLORIDE 75 ML/HR: 900 INJECTION, SOLUTION INTRAVENOUS at 01:12

## 2018-09-17 RX ADMIN — Medication 1 AMPULE: at 22:11

## 2018-09-17 RX ADMIN — CARBIDOPA AND LEVODOPA 1 TABLET: 25; 250 TABLET ORAL at 05:33

## 2018-09-17 RX ADMIN — DEXTROSE MONOHYDRATE AND SODIUM CHLORIDE 75 ML/HR: 5; .45 INJECTION, SOLUTION INTRAVENOUS at 13:59

## 2018-09-17 RX ADMIN — CLINDAMYCIN PHOSPHATE 600 MG: 150 INJECTION, SOLUTION INTRAVENOUS at 05:33

## 2018-09-17 RX ADMIN — Medication 1 AMPULE: at 05:33

## 2018-09-17 NOTE — CONSULTS
H&P/Consult Note/Progress Note/Office Note:   Mariella Michael  MRN: [id number]  :1950  Age:67 y.o.    HPI: Mariella Michael is a 75 y.o. female who has PMHx of Parkinson disease, a fib on Coumadin s/p PPM, HTn, HLD, tremors, meningioma who presented to ED on the night of 18 with increased confusion, agitation, and possible fever per a friend and home health. She lives at home with  who is wheelchair bound and has hospice and home health. On admission she was found to have INR 4.9 and sacral ulcer with purulent drainage. Tmax 99.4, tachycardic, on room air. She was started on IV Cefepime and Vanco. Based on ED notes, there is some concern that pt is not receiving adequate care in receiving her meds and diet. No family or friends have been present at bedside or been able to be reached by phone. Cultures and xrays for osteomyelitis are pending.     Past Medical History:   Diagnosis Date    Arrhythmia 2016    Atrial fibrillation (HCC) 2015    Dry eye     Heart disease 2016    High blood pressure 2016    History of basal cell cancer 2016    Hyperlipidemia 2016    Hypertension 2016    Presence of cardiac pacemaker 2015    Tremor 2016     Past Surgical History:   Procedure Laterality Date    HX COLONOSCOPY  2011    HX HEART CATHETERIZATION      HX OTHER SURGICAL      Cardiac Electrophysiology Mapping and Ablation    HX OTHER SURGICAL      Lung Biopsy    HX OTHER SURGICAL  1982    skin cancer excision, basal cell carcinoma on forehead    HX OTHER SURGICAL      Cardiac pacemaker placement    HX PARTIAL HYSTERECTOMY      Abdominal     HX SALPINGO-OOPHORECTOMY      Laparoscopic, with MARIA ELENA    HX TONSILLECTOMY       Current Facility-Administered Medications   Medication Dose Route Frequency    carbidopa-levodopa (SINEMET)  mg per tablet 1 Tab  1 Tab Oral QID    carbidopa-levodopa ER (SINEMET CR)  mg per tablet 2 Tab  2 Tab Oral QHS    gabapentin (NEURONTIN) capsule 100 mg  100 mg Oral TID    metoprolol succinate (TOPROL-XL) XL tablet 25 mg  25 mg Oral DAILY    . PHARMACY TO SUBSTITUTE PER PROTOCOL (Reordered from: Venlafaxine 150 mg tr24)    Per Protocol    famotidine (PF) (PEPCID) 20 mg in sodium chloride 0.9% 10 mL injection  20 mg IntraVENous DAILY    LORazepam (ATIVAN) injection 1 mg  1 mg IntraVENous Q4H PRN    sodium chloride (NS) flush 5 mL  5 mL InterCATHeter Q8H    sodium chloride (NS) flush 5-10 mL  5-10 mL InterCATHeter PRN    ondansetron (ZOFRAN) injection 4 mg  4 mg IntraVENous Q6H PRN    acetaminophen (TYLENOL) tablet 650 mg  650 mg Oral Q6H PRN    morphine injection 2 mg  2 mg IntraVENous Q4H PRN    oxyCODONE IR (ROXICODONE) tablet 5 mg  5 mg Oral Q6H PRN    warfarin (COUMADIN) tablet 5 mg - On Hold  5 mg Oral See Admin Instructions    alcohol 62% (NOZIN) nasal  1 Ampule  1 Ampule Topical Q12H    cefepime (MAXIPIME) 1 g in 0.9% sodium chloride (MBP/ADV) 50 mL ADV  1 g IntraVENous Q12H    influenza vaccine 2018-19 (6 mos+)(PF) (FLUARIX QUAD/FLULAVAL QUAD) injection 0.5 mL  0.5 mL IntraMUSCular PRIOR TO DISCHARGE    sodium hypochlorite (QUARTER STRENGTH DAKIN'S) 0.125% irrigation (bottle)   Topical BID    dextrose 5 % - 0.45% NaCl infusion  75 mL/hr IntraVENous CONTINUOUS    [START ON 9/18/2018] vancomycin (VANCOCIN) 750 mg in  ml infusion  750 mg IntraVENous Q18H    sodium chloride 0.9 % bolus infusion 100 mL  100 mL IntraVENous RAD ONCE    saline peripheral flush soln 10 mL  10 mL InterCATHeter RAD ONCE    iopamidol (ISOVUE-370) 76 % injection 100 mL  100 mL IntraVENous RAD ONCE     Codeine; Penicillins; and Sulfa (sulfonamide antibiotics)  Social History     Social History    Marital status:      Spouse name: N/A    Number of children: N/A    Years of education: N/A     Social History Main Topics    Smoking status: Never Smoker    Smokeless tobacco: Never Used    Alcohol use No    Drug use: No    Sexual activity: Not Asked     Other Topics Concern    None     Social History Narrative     History   Smoking Status    Never Smoker   Smokeless Tobacco    Never Used     Family History   Problem Relation Age of Onset    Heart Disease Mother     Thyroid Disease Mother     Heart Disease Father     Cancer Father      Lung Cancer     ROS: Limited by patients mental status. Subjective fever. Comprehensive review of systems was otherwise unremarkable except as noted above. Physical Exam:   Visit Vitals    /64 (BP 1 Location: Left arm, BP Patient Position: At rest)    Pulse 70    Temp 98.6 °F (37 °C)    Resp 20    Ht 5' (1.524 m)    Wt 91 lb 4.8 oz (41.4 kg)    LMP  (LMP Unknown)    SpO2 97%    Breastfeeding No    BMI 17.83 kg/m2     Constitutional: Alert, oriented to self only, cooperative patient in no acute distress; appears stated age    Eyes:Sclera are clear. EOMs intact  ENMT: no external lesions gross hearing normal; no obvious neck masses, no ear or lip lesions, nares normal  CV: regular rate  Resp: No JVD. Breathing is  non-labored; no audible wheezing. CTAB. GI: soft and non-distended, non tender. + BS. Musculoskeletal: severe bilateral UE tremor  Skin: stage 3-4 sacral ulcer. Wound bed is relatively clean with pink tissue, no foul smell, slough or eschar, with small amount of purulent material at 2 O'Clock position. ~2cm circumferential undermining from the 4 O'Clock to 1 O'Clock position. Wound is tender. Bone palpable but not visible, covered with very thin layer of tissue. Neuro:   Moves all 4, severe tremors  Psychiatric: + memory impairment    Recent vitals (if inpt):  Patient Vitals for the past 24 hrs:   BP Temp Pulse Resp SpO2 Height Weight   09/17/18 1600 124/64 98.6 °F (37 °C) 70 20 97 % - -   09/17/18 1245 142/73 - - - - - -   09/17/18 1130 (!) 115/101 98 °F (36.7 °C) 83 24 - - -   09/17/18 0750 147/81 97.4 °F (36.3 °C) 74 19 - - - 09/17/18 0400 126/63 98.1 °F (36.7 °C) 74 18 99 % - -   09/17/18 0120 - - 96 - - - -   09/17/18 0056 - 98.7 °F (37.1 °C) 75 - - 5' (1.524 m) 91 lb 4.8 oz (41.4 kg)   09/17/18 0052 142/65 - - 18 93 % - -   09/16/18 2357 - - 73 21 96 % - -   09/16/18 2348 120/56 99 °F (37.2 °C) 85 16 98 % - -   09/16/18 2336 - - 69 18 96 % - -   09/16/18 2318 - - (!) 110 (!) 46 96 % - -   09/16/18 2307 - - 85 16 98 % - -   09/16/18 2303 - - 82 21 96 % - -   09/16/18 2233 118/66 - - - - - -   09/16/18 2231 - - 77 12 98 % - -   09/16/18 2229 - - 77 - 96 % - -   09/16/18 2200 - - 81 14 96 % - -   09/16/18 2130 - - 92 - 97 % - -   09/16/18 2111 - - 84 18 94 % - -   09/16/18 2037 - - 86 21 92 % - -   09/16/18 2010 116/68 - - - - - -   09/16/18 2000 - - 87 - 97 % - -   09/16/18 1929 - - (!) 145 - 95 % - -   09/16/18 1918 134/62 99.4 °F (37.4 °C) - 20 (!) 85 % 5' (1.524 m) 100 lb (45.4 kg)       Labs:  Recent Labs      09/17/18   0431   WBC  7.3   HGB  8.6*   PLT  285   NA  141   K  4.0   CL  106   CO2  27   BUN  24*   CREA  0.31*   GLU  82   PTP  43.5*   INR  4.9*   TBILI  0.3   SGOT  29   ALT  7*   AP  80       Lab Results   Component Value Date/Time    WBC 7.3 09/17/2018 04:31 AM    HGB 8.6 (L) 09/17/2018 04:31 AM    PLATELET 005 74/72/9432 04:31 AM    Sodium 141 09/17/2018 04:31 AM    Potassium 4.0 09/17/2018 04:31 AM    Chloride 106 09/17/2018 04:31 AM    CO2 27 09/17/2018 04:31 AM    BUN 24 (H) 09/17/2018 04:31 AM    Creatinine 0.31 (L) 09/17/2018 04:31 AM    Glucose 82 09/17/2018 04:31 AM    INR 4.9 (HH) 09/17/2018 04:31 AM    aPTT 49.3 (H) 02/06/2018 01:04 AM    Bilirubin, total 0.3 09/17/2018 04:31 AM    AST (SGOT) 29 09/17/2018 04:31 AM    ALT (SGPT) 7 (L) 09/17/2018 04:31 AM    Alk. phosphatase 80 09/17/2018 04:31 AM    Ammonia <10 (L) 02/06/2018 01:04 AM    Troponin-I, Qt. <0.02 (L) 04/29/2018 05:34 AM       I reviewed recent labs and recent radiologic studies.   CT Results (most recent):    Results from [facility] encounter on 06/19/18   CT SPINE CERV WO CONT   Narrative History: Fall down concrete steps with head injury. Dizziness. EXAM: CT cervical spine without contrast    TECHNIQUE: Thin section axial CT images are obtained through the cervical spine. Coronal and sagittal reformatted images are obtained based on the axial data. Radiation dose reduction techniques were used for this study. Our CT scanners  use one or all of the following: Automated exposure control, adjustment of the  mA and/or kV according to patient size, use of iterative reconstruction. FINDINGS: There is fairly advanced multilevel cervical spondylosis with  multilevel facet arthropathy. There is mild anterolisthesis C4 on C5 and C3 on  C4. There is no prevertebral soft tissue edema. The craniocervical junction is  normal. There is no acute cervical spine fracture. The lung apices are clear. Impression IMPRESSION: Advanced multilevel cervical spondylosis with a degenerative  anterolisthesis C4 on C5 and C3 on C4. No acute cervical spine fracture. XR Results (most recent):    Results from Hospital Encounter encounter on 09/16/18   XR CHEST PORT   Narrative Portable chest xray      COMPARISON: April 29, 2018. CLINICAL HISTORY: Chest pain. FINDINGS:    There is a stable left-sided cardiac pacer. Cardiac mediastinal contour is  within normal limits. No pulmonary consolidation, pleural effusion or  pneumothorax thorax. No pulmonary edema. Bones are osteopenic. Small calcified  granuloma is present in the left lung. Impression IMPRESSION:    No acute pulmonary disease. I independently reviewed radiology images for studies I described above or studies I have ordered.    Admission date (for inpatients): 9/16/2018   * No surgery found *  * No surgery found *    ASSESSMENT/PLAN:  Problem List  Date Reviewed: 11/6/2017          Codes Class Noted    Debility ICD-10-CM: R53.81  ICD-9-CM: 799.3  2/49/9527        Metabolic encephalopathy ICD-10-CM: G93.41  ICD-9-CM: 348.31  9/16/2018        * (Principal)Sepsis ([facility] ICD-10-CM: A41.9  ICD-9-CM: 038.9, 995.91  9/16/2018        Pressure ulcer of sacral region, stage 3 (HCC) ICD-10-CM: X56.987  ICD-9-CM: 707.03, 707.23  9/16/2018        Tremors of nervous system ICD-10-CM: R25.1  ICD-9-CM: 781.0  2/6/2018        Meningioma ([facility] ICD-10-CM: D32.9  ICD-9-CM: 225.2  2/6/2018        Parkinson disease ([facility] ICD-10-CM: Agusto David  ICD-9-CM: 332.0  8/15/2016        RBD (REM behavioral disorder) ICD-10-CM: J03.27  ICD-9-CM: 327.42  8/15/2016        Hypertension ICD-10-CM: I10  ICD-9-CM: 401.9  7/14/2016        Hyperlipidemia ICD-10-CM: E78.5  ICD-9-CM: 272.4  7/14/2016        History of basal cell cancer ICD-10-CM: Z85.828  ICD-9-CM: V10.83  7/14/2016        Atrial fibrillation ([facility] ICD-10-CM: I48.91  ICD-9-CM: 427.31  8/24/2015        Presence of cardiac pacemaker ICD-10-CM: Z95.0  ICD-9-CM: V45.01  8/24/2015            Principal Problem:    Sepsis ([facility] (9/16/2018)    Active Problems:    Parkinson disease ([facility] (0/79/5349)      Metabolic encephalopathy (8/31/3727)      Pressure ulcer of sacral region, stage 3 ([facility] (9/16/2018)      Debility (9/17/2018)         Plan:  Wound care-- Dakins moist to dry dressing BID or PRN for soiling  IV Abx per primary team  Cultures pending  Await imaging results to evaluate possible osteomyelitis  Continue current care per primary team    Signed:  Michael Batters, MD     I have seen and examined the patient with the Nurse Practitioner and agree with the above assessment and plan.    Debilitated   IV Abx for sepsis  Hold Coumadin for now; INR 4.9   Daily dressing changes and air mattress for now  Consider surgical debridement when INR appropriate for intervention      Cam Chris MD

## 2018-09-17 NOTE — PROGRESS NOTES
End of Shift Note:  
 
Called  to complete the admission database however unable to reach him. Afebrile with am Lactic Acid 0.5. INR 4.9 this am, MD aware, no s/sx abnormal bleeding. For Neuro, Wound and Surgical consults today. Does not appear in acute distress. Repositioned frequently off sacrum. Continuing with POC. Report at [facility] to onclatisha RN.

## 2018-09-17 NOTE — PROGRESS NOTES
Hospitalist Progress Note 2018 Admit Date: 2018  7:28 PM  
NAME: Frances Arguelles :  1950 DOS:              18 MRN:  [id number] Attending: Amada Corcoran MD 
PCP:  Sofi Mehta MD 
Treatment Team: Attending Provider: Eulogio Lemus MD; Consulting Provider: Maria A Lux MD; Consulting Provider: Nathanael Mcintyre MD; Student Nurse: Endy Rosales; Utilization Review: Miri Costa RN Full Code SUBJECTIVE: As previously documented: 50-year-old female patient who has a very well known history of Parkinson disease, depression and atrial fibrillation on anticoagulation with Coumadin. She was brought into the emergency room this evening because according to her friends, since yesterday she has been more confused. She lives at home with her wheelchair bound , and apparently he also has a stone, which is not involved in her care. Her friends have severe concerns about proper medication administration, diet and general care. She has home health services and apparently she also has home hospice services, but even with that apparently her care has been suboptimal. In the ED she was found to have a  deep sacral ulcer with purulent secretion. Her initial blood workup reported a white blood cell count of 14,000, stable hemoglobin, normal platelets. INR of 4.7. Surgery evaluation pending for possible debridement. 18    Chuyita Casey is pleasantly confused laying in the bed. Patient stated \"I wish my  to be here\". Patient was talkative. No family members at bedside. 10+ ROS reviewed and negative except for positive in HPI. Allergies Allergen Reactions  Codeine Unknown (comments)  Penicillins Unknown (comments)  Sulfa (Sulfonamide Antibiotics) Unknown (comments) Current Facility-Administered Medications Medication Dose Route Frequency  carbidopa-levodopa (SINEMET)  mg per tablet 1 Tab  1 Tab Oral QID  carbidopa-levodopa ER (SINEMET CR)  mg per tablet 2 Tab  2 Tab Oral QHS  gabapentin (NEURONTIN) capsule 100 mg  100 mg Oral TID  metoprolol succinate (TOPROL-XL) XL tablet 25 mg  25 mg Oral DAILY  . PHARMACY TO SUBSTITUTE PER PROTOCOL (Reordered from: Venlafaxine 150 mg tr24)    Per Protocol  0.9% sodium chloride infusion  75 mL/hr IntraVENous CONTINUOUS  
 famotidine (PF) (PEPCID) 20 mg in sodium chloride 0.9% 10 mL injection  20 mg IntraVENous DAILY  LORazepam (ATIVAN) injection 1 mg  1 mg IntraVENous Q4H PRN  
 sodium chloride (NS) flush 5 mL  5 mL InterCATHeter Q8H  
 sodium chloride (NS) flush 5-10 mL  5-10 mL InterCATHeter PRN  
 ondansetron (ZOFRAN) injection 4 mg  4 mg IntraVENous Q6H PRN  
 acetaminophen (TYLENOL) tablet 650 mg  650 mg Oral Q6H PRN  
 morphine injection 2 mg  2 mg IntraVENous Q4H PRN  
 oxyCODONE IR (ROXICODONE) tablet 5 mg  5 mg Oral Q6H PRN  
 warfarin (COUMADIN) tablet 5 mg - On Hold  5 mg Oral See Admin Instructions  alcohol 62% (NOZIN) nasal  1 Ampule  1 Ampule Topical Q12H  cefepime (MAXIPIME) 1 g in 0.9% sodium chloride (MBP/ADV) 50 mL ADV  1 g IntraVENous Q12H  
 influenza vaccine 2018-19 (6 mos+)(PF) (FLUARIX QUAD/FLULAVAL QUAD) injection 0.5 mL  0.5 mL IntraMUSCular PRIOR TO DISCHARGE  sodium hypochlorite (QUARTER STRENGTH DAKIN'S) 0.125% irrigation (bottle)   Topical BID Immunization History Administered Date(s) Administered  TB Skin Test (PPD) Intradermal 02/06/2018 Objective:  
Patient Vitals for the past 24 hrs: 
 Temp Pulse Resp BP SpO2  
09/17/18 1130 98 °F (36.7 °C) 83 24 (!) 115/101 -  
09/17/18 0750 97.4 °F (36.3 °C) 74 19 147/81 -  
09/17/18 0400 98.1 °F (36.7 °C) 74 18 126/63 99 % 09/17/18 0120 - 96 - - -  
09/17/18 0056 98.7 °F (37.1 °C) 75 - - -  
09/17/18 0052 - - 18 142/65 93 % 09/16/18 2357 - 73 21 - 96 % 18 2348 99 °F (37.2 °C) 85 16 120/56 98 % 18 2336 - 69 18 - 96 % 18 2318 - (!) 110 (!) 46 - 96 % 18 2307 - 85 16 - 98 % 18 2303 - 82 21 - 96 % 183 - - - 118/66 -  
18 - 77 12 - 98 % 189 - 77 - - 96 % 180 - 81 14 - 96 % 180 - 92 - - 97 % 18 -  18 - 94 % 18 -  21 - 92 % 18 - - - 116/68 -  
18 - - 97 % 18 - (!) 145 - - 95 % 18 1918 99.4 °F (37.4 °C) - 20 134/62 (!) 85 % Temp (24hrs), Av.4 °F (36.9 °C), Min:97.4 °F (36.3 °C), Max:99.4 °F (37.4 °C) Oxygen Therapy O2 Sat (%): 99 % (18 0400) Pulse via Oximetry: 74 beats per minute (18 2357) O2 Device: Room air (18 0400) Oxygen Therapy O2 Sat (%): 99 % (18 0400) Pulse via Oximetry: 74 beats per minute (18 2357) O2 Device: Room air (18 0400) Physical Exam: 
General:         Alert, cooperative, no distress HEENT:               NCAT. No obvious deformity. Lungs: No wheezing/rhonchi/rales Cardiovascular:   RRR. No m/r/g. No pedal edema b/l. Abdomen:       S/nt/nd. Bowel sounds normal. .  
Skin:         Sacral ulcer about 3x 4 inches with yellowish drianage Neurologic:     contrated having tremors of upper extremities. Not following commands Psychiatric:         Good mood. Normal affect. DIAGNOSTIC STUDIES Data Review:  
Recent Results (from the past 24 hour(s)) POC LACTIC ACID Collection Time: 18  7:47 PM  
Result Value Ref Range Lactic Acid (POC) 2.6 (H) 0.5 - 1.9 mmol/L PROTHROMBIN TIME + INR Collection Time: 18  7:48 PM  
Result Value Ref Range Prothrombin time 42.1 (H) 11.5 - 14.5 sec INR 4.7 (HH)    
CBC WITH AUTOMATED DIFF Collection Time: 18  7:51 PM  
Result Value Ref Range WBC 14.0 (H) 4.3 - 11.1 K/uL  
 RBC 3.41 (L) 4.05 - 5.2 M/uL HGB 10.0 (L) 11.7 - 15.4 g/dL HCT 31.4 (L) 35.8 - 46.3 % MCV 92.1 79.6 - 97.8 FL  
 MCH 29.3 26.1 - 32.9 PG  
 MCHC 31.8 31.4 - 35.0 g/dL  
 RDW 15.8 % PLATELET 612 126 - 940 K/uL MPV 9.1 (L) 9.4 - 12.3 FL ABSOLUTE NRBC 0.00 0.0 - 0.2 K/uL  
 DF AUTOMATED NEUTROPHILS 82 (H) 43 - 78 % LYMPHOCYTES 10 (L) 13 - 44 % MONOCYTES 8 4.0 - 12.0 % EOSINOPHILS 0 (L) 0.5 - 7.8 % BASOPHILS 0 0.0 - 2.0 % IMMATURE GRANULOCYTES 1 0.0 - 5.0 %  
 ABS. NEUTROPHILS 11.4 (H) 1.7 - 8.2 K/UL  
 ABS. LYMPHOCYTES 1.4 0.5 - 4.6 K/UL  
 ABS. MONOCYTES 1.1 0.1 - 1.3 K/UL  
 ABS. EOSINOPHILS 0.0 0.0 - 0.8 K/UL  
 ABS. BASOPHILS 0.0 0.0 - 0.2 K/UL  
 ABS. IMM. GRANS. 0.1 0.0 - 0.5 K/UL METABOLIC PANEL, COMPREHENSIVE Collection Time: 09/16/18  7:51 PM  
Result Value Ref Range Sodium 138 136 - 145 mmol/L Potassium 4.7 3.5 - 5.1 mmol/L Chloride 102 98 - 107 mmol/L  
 CO2 28 21 - 32 mmol/L Anion gap 8 7 - 16 mmol/L Glucose 139 (H) 65 - 100 mg/dL BUN 31 (H) 8 - 23 MG/DL Creatinine 0.71 0.6 - 1.0 MG/DL  
 GFR est AA >60 >60 ml/min/1.73m2 GFR est non-AA >60 >60 ml/min/1.73m2 Calcium 8.7 8.3 - 10.4 MG/DL Bilirubin, total 0.3 0.2 - 1.1 MG/DL  
 ALT (SGPT) 10 (L) 12 - 65 U/L  
 AST (SGOT) 33 15 - 37 U/L Alk. phosphatase 96 50 - 136 U/L Protein, total 5.9 (L) 6.3 - 8.2 g/dL Albumin 2.6 (L) 3.2 - 4.6 g/dL Globulin 3.3 2.3 - 3.5 g/dL A-G Ratio 0.8 (L) 1.2 - 3.5 CULTURE, BLOOD Collection Time: 09/16/18  7:51 PM  
Result Value Ref Range Special Requests: RIGHT 
ARM Culture result: NO GROWTH AFTER 13 HOURS    
CULTURE, BLOOD Collection Time: 09/16/18  7:51 PM  
Result Value Ref Range Special Requests: LEFT 
ARM Culture result: NO GROWTH AFTER 13 HOURS MAGNESIUM Collection Time: 09/16/18  7:51 PM  
Result Value Ref Range Magnesium 2.1 1.8 - 2.4 mg/dL POC TROPONIN-I Collection Time: 09/16/18  8:17 PM  
Result Value Ref Range Troponin-I (POC) 0.02 0.02 - 0.05 ng/ml POC LACTIC ACID Collection Time: 09/16/18  8:18 PM  
Result Value Ref Range Lactic Acid (POC) 2.1 (H) 0.5 - 1.9 mmol/L INFLUENZA A & B AG (RAPID TEST) Collection Time: 09/16/18 11:52 PM  
Result Value Ref Range Influenza A Ag NEGATIVE  NEG Influenza B Ag NEGATIVE  NEG Source NASOPHARYNGEAL    
EKG, 12 LEAD, INITIAL Collection Time: 09/17/18 12:41 AM  
Result Value Ref Range Ventricular Rate 58 BPM  
 Atrial Rate 58 BPM  
 P-R Interval 162 ms QRS Duration 90 ms Q-T Interval 412 ms QTC Calculation (Bezet) 404 ms Calculated P Axis 71 degrees Calculated R Axis -10 degrees Calculated T Axis 51 degrees Diagnosis    
  !! AGE AND GENDER SPECIFIC ECG ANALYSIS !! Sinus bradycardia Otherwise normal ECG When compared with ECG of 16-SEP-2018 19:42, 
Vent. rate has decreased BY  36 BPM 
Questionable change in QRS axis Confirmed by NICKY CORDON ()Yasemin ([id number]) on 9/17/2018 7:31:00 AM 
  
CULTURE, WOUND W GRAM STAIN Collection Time: 09/17/18 12:49 AM  
Result Value Ref Range Special Requests: NO SPECIAL REQUESTS    
 GRAM STAIN 0 TO 20 WBC'S/OIF   
 GRAM STAIN NO DEFINITE ORGANISM SEEN Culture result:     
  NO GROWTH AFTER SHORT PERIOD OF INCUBATION. FURTHER RESULTS TO FOLLOW AFTER OVERNIGHT INCUBATION. EKG, 12 LEAD, INITIAL Collection Time: 09/17/18  1:51 AM  
Result Value Ref Range Ventricular Rate 80 BPM  
 Atrial Rate 80 BPM  
 P-R Interval 150 ms QRS Duration 78 ms Q-T Interval 384 ms QTC Calculation (Bezet) 442 ms Calculated P Axis 84 degrees Calculated R Axis 68 degrees Calculated T Axis 78 degrees Diagnosis Normal sinus rhythm Normal ECG When compared with ECG of 17-SEP-2018 01:50, 
Premature supraventricular complexes are no longer Present Confirmed by NICKY CORDON ()Yasemin ([id number]) on 9/17/2018 7:17:31 AM 
  
CBC W/O DIFF  Collection Time: 09/17/18  4:31 AM  
 Result Value Ref Range WBC 7.3 4.3 - 11.1 K/uL  
 RBC 2.94 (L) 4.05 - 5.2 M/uL HGB 8.6 (L) 11.7 - 15.4 g/dL HCT 27.6 (L) 35.8 - 46.3 % MCV 93.9 79.6 - 97.8 FL  
 MCH 29.3 26.1 - 32.9 PG  
 MCHC 31.2 (L) 31.4 - 35.0 g/dL  
 RDW 15.9 % PLATELET 548 639 - 274 K/uL MPV 9.1 (L) 9.4 - 12.3 FL ABSOLUTE NRBC 0.00 0.0 - 0.2 K/uL METABOLIC PANEL, COMPREHENSIVE Collection Time: 09/17/18  4:31 AM  
Result Value Ref Range Sodium 141 136 - 145 mmol/L Potassium 4.0 3.5 - 5.1 mmol/L Chloride 106 98 - 107 mmol/L  
 CO2 27 21 - 32 mmol/L Anion gap 8 7 - 16 mmol/L Glucose 82 65 - 100 mg/dL BUN 24 (H) 8 - 23 MG/DL Creatinine 0.31 (L) 0.6 - 1.0 MG/DL  
 GFR est AA >60 >60 ml/min/1.73m2 GFR est non-AA >60 >60 ml/min/1.73m2 Calcium 7.9 (L) 8.3 - 10.4 MG/DL Bilirubin, total 0.3 0.2 - 1.1 MG/DL  
 ALT (SGPT) 7 (L) 12 - 65 U/L  
 AST (SGOT) 29 15 - 37 U/L Alk. phosphatase 80 50 - 136 U/L Protein, total 4.9 (L) 6.3 - 8.2 g/dL Albumin 2.3 (L) 3.2 - 4.6 g/dL Globulin 2.6 2.3 - 3.5 g/dL A-G Ratio 0.9 (L) 1.2 - 3.5 MAGNESIUM Collection Time: 09/17/18  4:31 AM  
Result Value Ref Range Magnesium 2.2 1.8 - 2.4 mg/dL LACTIC ACID Collection Time: 09/17/18  4:31 AM  
Result Value Ref Range Lactic acid 0.5 0.4 - 2.0 MMOL/L  
PROTHROMBIN TIME + INR Collection Time: 09/17/18  4:31 AM  
Result Value Ref Range Prothrombin time 43.5 (H) 11.5 - 14.5 sec INR 4.9 (HH)    
TSH 3RD GENERATION Collection Time: 09/17/18  4:31 AM  
Result Value Ref Range TSH 0.287 (L) 0.358 - 3.740 uIU/mL All Micro Results Procedure Component Value Units Date/Time April Carwin STAIN [[id number]] Collected:  09/17/18 0049 Order Status:  Completed Specimen:  Wound from Wound Drainage Updated:  09/17/18 8545 Special Requests: NO SPECIAL REQUESTS     
  GRAM STAIN 0 TO 20 WBC'S/OIF  
   NO DEFINITE ORGANISM SEEN Culture result: NO GROWTH AFTER SHORT PERIOD OF INCUBATION. FURTHER RESULTS TO FOLLOW AFTER OVERNIGHT INCUBATION. CULTURE, BLOOD [[id number]] Collected:  09/16/18 1951 Order Status:  Completed Specimen:  Blood from Blood Updated:  09/17/18 1012 Special Requests: --     
  RIGHT 
ARM Culture result: NO GROWTH AFTER 13 HOURS     
 CULTURE, BLOOD [[id number]] Collected:  09/16/18 1951 Order Status:  Completed Specimen:  Blood from Blood Updated:  09/17/18 1012 Special Requests: --     
  LEFT 
ARM Culture result: NO GROWTH AFTER 13 HOURS INFLUENZA A & B AG (RAPID TEST) [[id number]] Collected:  09/16/18 2352 Order Status:  Completed Specimen:  Nasopharyngeal from Nasal washing Updated:  09/17/18 0014 Influenza A Ag NEGATIVE      
   NEGATIVE FOR THE PRESENCE OF INFLUENZA A ANTIGEN 
INFECTION DUE TO INFLUENZA A CANNOT BE RULED OUT. BECAUSE THE ANTIGEN PRESENT IN THE SAMPLE MAY BE BELOW 
THE DETECTION LIMIT OF THE TEST. A NEGATIVE TEST IS PRESUMPTIVE AND IT IS RECOMMENDED THAT THESE RESULTS BE CONFIRMED BY VIRAL CULTURE OR AN FDA-CLEARED INFLUENZA A AND B MOLECULAR ASSAY. Influenza B Ag NEGATIVE      
   NEGATIVE FOR THE PRESENCE OF INFLUENZA B ANTIGEN 
INFECTION DUE TO INFLUENZA B CANNOT BE RULED OUT. BECAUSE THE ANTIGEN PRESENT IN THE SAMPLE MAY BE BELOW 
THE DETECTION LIMIT OF THE TEST. A NEGATIVE TEST IS PRESUMPTIVE AND IT IS RECOMMENDED THAT THESE RESULTS BE CONFIRMED BY VIRAL CULTURE OR AN FDA-CLEARED INFLUENZA A AND B MOLECULAR ASSAY. Source NASOPHARYNGEAL Imaging Jaspal Freed /Studies: CXR Results  (Last 48 hours) 09/16/18 2006  XR CHEST PORT Final result Impression:  IMPRESSION:  
   
No acute pulmonary disease. Narrative:  Portable chest xray COMPARISON: April 29, 2018. CLINICAL HISTORY: Chest pain. FINDINGS:  
   
There is a stable left-sided cardiac pacer. Cardiac mediastinal contour is within normal limits. No pulmonary consolidation, pleural effusion or  
pneumothorax thorax. No pulmonary edema. Bones are osteopenic. Small calcified  
granuloma is present in the left lung. CT Results  (Last 48 hours) None Xr Chest [facility] Result Date: 9/16/2018 IMPRESSION: No acute pulmonary disease. No results found for this visit on 09/16/18. Labs and Studies from previous 24 hours have been personally reviewed by myself   
ASSESSMENT Active Hospital Problems Diagnosis Date Noted  Metabolic encephalopathy 11/87/3205  Sepsis ([facility] [address] 09/16/2018  Sacral ulcer ([facility] [address] 09/16/2018  Parkinson disease ([facility] [address] 08/15/2016 Hospital Problems as of 9/17/2018  Date Reviewed: 11/6/2017 Codes Class Noted - Resolved POA Metabolic encephalopathy ITY-73-TS: G93.41 
ICD-9-CM: 348.31  9/16/2018 - Present Unknown * (Principal)Sepsis ([email or web address] ICD-10-CM: A41.9 ICD-9-CM: 038.9, 995.91  9/16/2018 - Present Unknown Sacral ulcer ([email or web address] ICD-10-CM: F64.614 ICD-9-CM: 707.8  9/16/2018 - Present Unknown Parkinson disease [facility]) ICD-10-CM: G20 
ICD-9-CM: 332.0  8/15/2016 - Present Unknown A/P: 
 
-Sepsis due to infected sacral ulcer WBC trended down? Hemodiluted Cultures pending Surgery to evaluate patient today for possible debridement Wound care consul place MRI sacrum ordered Will switch vanco and zosyn as wound is near perirectal area and can be have polymicrobial infection Deescalate according to cultures 
 
-Acute metabolic encephalopathy Likely from dehydration and infection Cont treatment as above 
 
-Parkinson's disease Worsening at home Cont home medications Neuro eval pending 
 
-Dehydration Improving Switch fluid to d5+ 1/2 ns as having poor PO intake 
 
-Afib Rate controlled Cont BB Coumadin on hold, INR 4.9 Give vitamin K in case surgical intervention needed 
 
-Anemia Likely hemodilution. No active bleeding reported Check CBC on AM 
 
 
DVT Prophylaxis: coumadin Plan of Care Discussed with: patient. Care team. 
 
 
Nguyen Anguiano MD 
09/17/18

## 2018-09-17 NOTE — ED NOTES
Patient report received from [facility]. Patient care to be assumed at this time.

## 2018-09-17 NOTE — CONSULTS
Consult    Patient: Jania Huerta MRN: [id number]     YOB: 1950  Age: 75 y.o. Sex: female      Subjective:      Jania Huerta is a 75 y.o. female who is being seen for tremor. There is no family at bedside and the patient is unable to provide history. HPI is obtain from RN and previous notes. The patient was brought to the ED on 09/16 by friends after it was felt that her mental status was acutely worse. There was also concern for fever. It was noted that her tremor has been worse recently. She has an underlying history of dementia and PD. The patient was found to have a large sacral decubitus ulcer with concern for sepsis and concerns for malnutrition, dehydration. The friends who brought the patient to the ED expressed concern that she may not be getting the care she needs, specifically may not be getting/taking medications appropriately. She has a history of Afib and INR was supratherapeutic on admission. On initial exam, the patient is noted to have intention tremor in bilateral upper extremities. She is oriented only to person. Per RN, the patient is prescribed Sinemet, however there was concern she had not been taking appropriate dose, as the patient had prescription bottles with several different doses. RN plans to reconcile medications with patient's  today. She is a current patient of Dr. Ellen Gallardo, last seen in April. There was concern previously by Dr. Ellen Gallardo that the patient may not be taking medications appropriately.      Past Medical History:   Diagnosis Date    Arrhythmia 7/14/2016    Atrial fibrillation ([address] 8/24/2015    Dry eye     Heart disease 7/14/2016    High blood pressure 7/14/2016    History of basal cell cancer 7/14/2016    Hyperlipidemia 7/14/2016    Hypertension 7/14/2016    Presence of cardiac pacemaker 8/24/2015    Tremor 7/14/2016     Past Surgical History:   Procedure Laterality Date    HX COLONOSCOPY  12/2011   Errol Cervantes HX HEART CATHETERIZATION      HX OTHER SURGICAL      Cardiac Electrophysiology Mapping and Ablation    HX OTHER SURGICAL      Lung Biopsy    HX OTHER SURGICAL  1982    skin cancer excision, basal cell carcinoma on forehead    HX OTHER SURGICAL  6/12    Cardiac pacemaker placement    HX PARTIAL HYSTERECTOMY      Abdominal     HX SALPINGO-OOPHORECTOMY      Laparoscopic, with MARIA ELENA    HX TONSILLECTOMY        Family History   Problem Relation Age of Onset    Heart Disease Mother     Thyroid Disease Mother     Heart Disease Father     Cancer Father      Lung Cancer     Social History   Substance Use Topics    Smoking status: Never Smoker    Smokeless tobacco: Never Used    Alcohol use No      Current Facility-Administered Medications   Medication Dose Route Frequency Provider Last Rate Last Dose    carbidopa-levodopa (SINEMET)  mg per tablet 1 Tab  1 Tab Oral QID Júnior Akhtar MD   1 Tab at 09/17/18 5356    carbidopa-levodopa ER (SINEMET CR)  mg per tablet 2 Tab  2 Tab Oral QHS Yg Plata MD   2 Tab at 09/17/18 0132    gabapentin (NEURONTIN) capsule 100 mg  100 mg Oral TID Júnior Akhtar MD   100 mg at 09/17/18 0855    metoprolol succinate (TOPROL-XL) XL tablet 25 mg  25 mg Oral DAILY Júnior Akhtar MD   25 mg at 09/17/18 0855    . PHARMACY TO SUBSTITUTE PER PROTOCOL (Reordered from: Venlafaxine 150 mg tr24)    Per Protocol Júnior Akhtar MD        0.9% sodium chloride infusion  75 mL/hr IntraVENous CONTINUOUS Júnior Akhtar MD 75 mL/hr at 09/17/18 0112 75 mL/hr at 09/17/18 0112    famotidine (PF) (PEPCID) 20 mg in sodium chloride 0.9% 10 mL injection  20 mg IntraVENous DAILY Júnior Akhtar MD   20 mg at 09/17/18 0855    LORazepam (ATIVAN) injection 1 mg  1 mg IntraVENous Q4H PRN Júnior Akhtar MD        sodium chloride (NS) flush 5 mL  5 mL InterCATHeter Q8H Yg Plata MD   5 mL at 09/17/18 0516    sodium chloride (NS) flush 5-10 mL  5-10 mL InterCATHeter PRN Roel Doll MD        [facility]etron [facility]) injection 4 mg  4 mg IntraVENous Q6H PRN Roel Doll MD        acetaminophen (TYLENOL) tablet 650 mg  650 mg Oral Q6H PRN Roel Doll MD        morphine injection 2 mg  2 mg IntraVENous Q4H PRN Roel Doll MD        oxyCODONE IR (ROXICODONE) tablet 5 mg  5 mg Oral Q6H PRN Roel Doll MD        warfarin (COUMADIN) tablet 5 mg - On Hold  5 mg Oral See Admin Instructions Roel Doll MD        alcohol 62% (NOZIN) nasal  1 Ampule  1 Ampule Topical Q12H Roel Doll MD   1 Ampule at 09/17/18 0855    cefepime (MAXIPIME) 1 g in 0.9% sodium chloride (MBP/ADV) 50 mL ADV  1 g IntraVENous Q12H Alyx Atkinson MD   1 g at 09/17/18 0855    vancomycin (VANCOCIN) 1,000 mg in 0.9% sodium chloride (MBP/ADV) 250 mL  1,000 mg IntraVENous ONCE Roel Doll  mL/hr at 09/17/18 1055 1,000 mg at 09/17/18 1055    influenza vaccine 2018-19 (6 mos+)(PF) (FLUARIX QUAD/FLULAVAL QUAD) injection 0.5 mL  0.5 mL IntraMUSCular PRIOR TO DISCHARGE Roel Doll MD        sodium hypochlorite (QUARTER STRENGTH DAKIN'S) 0.125% irrigation (bottle)   Topical BID NIHARIKA Lozano            Allergies   Allergen Reactions    Codeine Unknown (comments)    Penicillins Unknown (comments)    Sulfa (Sulfonamide Antibiotics) Unknown (comments)       Review of Systems:  Unable to obtain d/t mental status    Objective:     Vitals:    09/17/18 0120 09/17/18 0400 09/17/18 0750 09/17/18 1130   BP:  126/63 147/81 (!) 115/101   Pulse: 96 74 74 83   Resp:  18 19 24   Temp:  98.1 °F (36.7 °C) 97.4 °F (36.3 °C) 98 °F (36.7 °C)   SpO2:  99%     Weight:       Height:            Physical Exam:  General - Thin, chronically ill appearing female. HEENT - Normocephalic, atraumatic. Conjunctiva and oropharynx are clear.    Neck - Supple without masses  Cardiovascular - Regular rate and rhythm. Normal S1, S2 without murmurs, rubs, or gallops. Lungs - Clear to auscultation. Abdomen - Soft, nontender with normal bowel sounds. Extremities - Peripheral pulses intact. Multiple bruises noted     Neurological examination - Oriented to person only. Pleasantly confused. Language and speech appear normal. On cranial nerve examination pupils are equal round and reactive to light. Extraocular motility is normal. Face is symmetric and sensation is intact to light touch. Hearing is intact to finger rustle bilaterally. Motor examination - Normal tone. Decreased muscle bulk. Power is full throughout. Hyperreflexic diffusley. Intention tremor in BUE. Sensation is intact to light touch in all extremities. Gait not examined.        No results found for: CHOL, CHOLPOCT, CHOLX, CHLST, CHOLV, HDL, HDLPOC, LDL, LDLCPOC, LDLC, DLDLP, VLDLC, VLDL, TGLX, TRIGL, TRIGP, TGLPOCT, CHHD, CHHDX     No results found for: HBA1C, HGBE8, XCP4XFSB, QRT5WAYW       Results for orders placed or performed during the hospital encounter of 09/16/18   EKG, 12 LEAD, INITIAL   Result Value Ref Range    Ventricular Rate 80 BPM    Atrial Rate 80 BPM    P-R Interval 150 ms    QRS Duration 78 ms    Q-T Interval 384 ms    QTC Calculation (Bezet) 442 ms    Calculated P Axis 84 degrees    Calculated R Axis 68 degrees    Calculated T Axis 78 degrees    Diagnosis       Normal sinus rhythm  Normal ECG  When compared with ECG of 17-SEP-2018 01:50,  Premature supraventricular complexes are no longer Present  Confirmed by NICKY CORDON (), Luis Stuart ([id number]) on 9/17/2018 7:17:31 AM              Assessment:     Hospital Problems  Date Reviewed: 11/6/2017          Codes Class Noted POA    Metabolic encephalopathy TDI-37-QJ: G93.41  ICD-9-CM: 348.31  9/16/2018 Unknown        * (Principal)Sepsis ([facility] [address] ICD-10-CM: A41.9  ICD-9-CM: 038.9, 995.91  9/16/2018 Unknown        [facility] ICD-10-CM: U72.906  ICD-9-CM: 707.8  9/16/2018 Unknown        [facility] ICD-10-CM: G20  ICD-9-CM: 332.0  8/15/2016 Unknown              Plan:     Continue current doses of Sinemet (Sinemet  QID and Sinemet ER  2 tablets qhs). No changes necessary in inpatient setting.      Patient is seen by Dr. Susi Delgado and should follow up with her in clinic after discharge     Signed By: Scott Moura NP     September 17, 2018

## 2018-09-17 NOTE — WOUND CARE
Noted 2 consults for wound, noted Dr. Angelina Hopkins is following wound and has ordered dakin's moist to dry bid and prn, defer to Dr. Pranav Riggs treatment. Available if needed for assistance with bandages please call.

## 2018-09-17 NOTE — PROGRESS NOTES
Problem: Nutrition Deficit Goal: *Optimize nutritional status Nutrition Reason for assessment: Referral received from nursing admission Malnutrition Screening Tool for recently lost unsure amount of weight without trying and eating poorly due to decreased appetite. Assessment:  
Diet order(s): mechanical soft with ensure enlive TID Food/Nutrition Patient History:  Pt sleeping during RD visit, no family at bedside. Pt was assisted with breakfast meal this morning. Per RN, pt ate well and was hungry. Note pt with h/o Parkinson's disease and was previously on hospice care. Sacral wound present at admission. Noted potential for lack of care at home? Anthropometrics:Height: 5' (152.4 cm),  Weight: 41.4 kg (91 lb 4.8 oz), Weight Source: Bed, Body mass index is 17.83 kg/(m^2). BMI class of underweight. WT / BMI 9/17/2018 8/11/2018 6/19/2018 4/29/2018 4/16/2018 WEIGHT 91 lb 4.8 oz 100 lb 100 lb 110 lb 110 lb 8 oz Per weights listed in EMR, potential for a 9 pound, 9% clinically significant weight loss over ~1 month and a 19 pound, 17.2% clinically significant weight loss within 6 months. Pt did have temporal wasting upon observation. Meets Criteria for Malnutrition in the context of Chronic Illness [x] Severe Malnutrition, as evidenced by: 
 [x] Severe loss of muscle mass 
 [] Nutritional intake of <75% of energy intake compared to estimated energy needs for > 1 month 
 [x] Weight loss of >5% in 1 month, >7.5% in 3 months,  >10% in 6 months, or >20% in 12 months 
 [] Severe edema 
 [] Severe loss of subcutaneous fat 
 [] Functional decline Potential for social/environmental malnutrition depending on care being provided at home. Unable to verify this currently. Macronutrient needs: EER:  2179-7476 kcal /day (30-35 kcal/kg listed BW) EPR:  41-53 grams protein/day (1-1.3 grams/kg listed BW) Intake/Comparative Standards: No recorded meal intakes. Nutrition Diagnosis: Severe/chronic malnutrition r/t predicted sub-optimal intake as evidenced by pt with significant weight loss noted above, physical signs of muscle loss upon observation, and skin breakdown (sacral wound) present at admission. Intervention: 
Meals and snacks: Continue current diet. Nutrition Supplement Therapy: continue ensure enlive TID Pt will continue to need assistance with meals. Discharge nutrition plan: Too soon to determine. Most likely meal assistance and supplementation as needed at discharge. Coordination of Nutrition Care: Mason Stinson RN, [address], [address], [phone]

## 2018-09-17 NOTE — PROGRESS NOTES
Patient has a MRI conditional [employer] pacemaker that will require a rep for exam. Patient has tremors. Nurse aware last MRI 7 months was cancelled due to tremors. Nurse will check on meds for patient.

## 2018-09-17 NOTE — ED NOTES
Patient has had bowel movement in brief. Patient changed and placed on her left side at this time. Vitals have been updated. Patient denies having any needs at this time. Will continue to monitor.

## 2018-09-17 NOTE — CDMP QUERY
Please clarify if this patient is being treated/managed for: SEVERE PROTEIN CALORIE MALNUTRITION in the setting of recent weight loss treating with Ensure Enlive TID, nutrition consult. =>Other Explanation of clinical findings =>Unable to Determine (no explanation of clinical findings) The medical record reflects the following: 
 
Risk Factors: recent weight loss, poor appetite Clinical Indicators: Meets Criteria for Malnutrition in the context of Chronic Illness Severe Malnutrition, as evidenced by: Severe loss of muscle mass, Weight loss of >5% in 1 month, >7.5% in 3 months,  >10% in 6 months, or >20% in 12 months Treatment: Ensure Enlive TID, nutrition consult Please clarify and document your clinical opinion in the progress notes and discharge summary including the definitive and/or presumptive diagnosis, (suspected or probable), related to the above clinical findings. Please include clinical findings supporting your diagnosis. Thanks, 
Juanis Webber RN Compliant Documentation Management Program 
[phone]

## 2018-09-17 NOTE — PROGRESS NOTES
Allevyn pad removed. Sacral wound bed is clean, red with scant amount of slough tissue noted. Wound has undermining of 0.5 cm at 12 o'clock, 1 cm at 5 o'clock, and 2 cm at 6-9 o'clock. 1 4x4 gauze moistened with Dakin's solution, packed in wound. Covered with 4x4 gauze and ABD pad. Griselda wound skin intact without redness.

## 2018-09-17 NOTE — H&P
Sanjulisa Genna [address] HISTORY AND PHYSICAL [place] Merlin 
MR#: [id number] : 1950 ACCOUNT #: [de-identified] ADMIT DATE: 2018 TIME OF ADMISSION:  11:00 p.m. CHIEF COMPLAINT:  Worsening tremors and confusion. HISTORY OF PRESENT ILLNESS:  This is a 59-year-old female patient who has a very well known history of Parkinson disease, depression and atrial fibrillation on anticoagulation with Coumadin. She was brought into the emergency room this evening because according to her friends, since yesterday she has been more confused. Her Parkinson's disease has gotten worse with extremely severe tremors and she seemed to be dehydrated. The amount of information that the patient can provide is limited. This is the same case with her friends and neighbors. She lives at home with her wheelchair bound , and apparently he also has a stone, which is not involved in her care. Her friends have severe concerns about proper medication administration, diet and general care. She has home health services and apparently she also has home hospice services, but even with that apparently her care has been suboptimal.  When she arrived into the emergency room, she was severely dehydrated. Her blood pressure was 134/62. Her heart rate was 145. Her O2 saturation was 95% and her respiratory rate was 20. She was having severe and violent tremors in her upper extremities every time she tried to do any movement. Her lungs were clear to auscultation. Her abdomen was soft and nontender, and she was found to have a deep sacral ulcer with purulent secretion. Her initial blood workup reported a white blood cell count of 14,000, stable hemoglobin, normal platelets. INR of 4.7. Normal electrolytes, normal creatinine level, normal liver function panel. A chest x-ray was done and showed no evidence of acute pulmonary disease.   Urinary dipstick was done and it was not suggestive of UTI. The patient received IV ceftriaxone in the emergency room and she was presented to the hospitalist team to be admitted. REVIEW OF SYSTEMS:  A review of 14 systems was performed and it was negative except for the findings that are reported in the HPI. PAST MEDICAL HISTORY: 
1. Parkinson disease. 2.  Atrial fibrillation, on anticoagulation with Coumadin. 3.  History of meningioma. 4.  History of pacemaker placement. PAST SURGICAL HISTORY:   
1.  Lung biopsy. 2.  Hysterectomy. 3.  Skin cancer excision. 4.  Cardiac pacemaker placement. SOCIAL HISTORY:  Denies smoking, alcohol use or illicit drug use. FAMILY HISTORY:  Positive for lung cancer, heart disease, thyroid disease. ALLERGIES: 
1.  CODEINE. 2.  PENICILLIN. 3.  SULFA. PHYSICAL EXAMINATION: 
VITAL SIGNS:  Blood pressure 118/66, heart rate 82, respiratory rate of 21, O2 saturation of 95%. EYES:  PERRLA. EARS, NOSE,  MOUTH AND THROAT:  There is no evidence of pharyngeal erythema, edema or purulent exudates. RESPIRATORY:  Clear breath sounds bilaterally. HEART:  Regular rate and rhythm with no murmurs. GASTROINTESTINAL:  Abdomen is soft and nontender with positive bowel sounds. GENITOURINARY:  Unremarkable. MUSCULOSKELETAL:  Cachectic. SKIN:  The patient has a 3-4 cm sacral ulcer, but she has extensive dead space under the skin and I suspect that once this ulcer is debrided, her ulcer will become larger. There is purulent secretion coming out. NEUROLOGIC:  Patient is alert. She is able to answer a few questions; however, generally speaking, she is confused. She has severe and violent tremors and ballistic movements of the upper extremities. PSYCHIATRIC:  Unable to be assessed. HEMATOLOGIC, LYMPHATIC, IMMUNOLOGIC:  No evidence of active bleeding. No abnormal lymphadenopathies.  
 
LABORATORY AND IMAGING RESULTS:  White blood cell count 14, hemoglobin 10.0, platelet count 987. Sodium 138, potassium 4.7, chloride 102, anion gap 8, glucose 139, BUN 31, creatinine 0.71. Magnesium 2.1. Total bilirubin 0.3, ALT 10, AST 33. INR 4.7. Chest x-ray seen and analyzed by me:  No lung infiltrates, no cardiomegaly. EKG pending to be done. ASSESSMENT:  This is a 48-year-old female patient who came into the emergency room with worsening confusion and tremors. She has been found septic and with an infected sacral ulcer. She is at high risk of further complications. She is going to be admitted to the hospital and her length of stay is calculated to be more than 2 midnights. PLAN: 
1. Sepsis secondary to an infected sacral ulcer. The patient will be admitted to the medical floor. She is extremely dehydrated and she will be resuscitated with IV fluids. Her initial lactic acid is slightly elevated. She will receive IV clindamycin, which is going to provide good coverage for MRSA, gram positives and anaerobes, and she will receive IV ceftriaxone to provide coverage for gram negatives and also gram-positive microorganisms. An x-ray of the sacrum has been ordered to rule out osteomyelitis. An ESR and a CRP have also been ordered. If the x-ray is not showing osteomyelitis, still I think that she will need either an MRI of the sacrum or a CT scan of this area. Wound Care has been consulted. General Surgery has been consulted as she most likely would need a surgical debridement.  has been consulted as there is some suspicion for elderly care neglect. 2.  Parkinson disease. According to her friends, her Parkinson's is much worse. She is having severe tremors with ballistic movements. I am not completely sure if her violent tremors are secondary to her Parkinson's disease or actually as a side effect of her medications.   For now, I will continue her regular dose of Sinemet and I have consulted Neurology to assess her. I have ordered IV Ativan because her movements are extreme and severely uncomfortable. 3.  Atrial fibrillation on anticoagulation with Coumadin. Her heart rate is controlled at this time. An EKG will be ordered. Her INR is supratherapeutic. No Coumadin will be provided and Pharmacy has been consulted to continue these medication, which as previously set is going to be held until her INR is therapeutic again. She is not bleeding and she does not require vitamin K at this time. 4.  Deep venous thrombosis prophylaxis. She is already on Coumadin. MD JEROME Warner/PHILLIP 
D: 09/16/2018 23:47    
T: 09/17/2018 01:34 
JOB #: [id number]

## 2018-09-17 NOTE — PROGRESS NOTES
Pharmacokinetic Consult to Pharmacist 
 
Kate Akersdiann is a 75 y.o. female being treated for sepsis due to infected sacral ulcer with vancomycin and cefepime. Height: 5' (152.4 cm)  Weight: 41.4 kg (91 lb 4.8 oz) Lab Results Component Value Date/Time BUN 24 (H) 09/17/2018 04:31 AM  
 Creatinine 0.31 (L) 09/17/2018 04:31 AM  
 WBC 7.3 09/17/2018 04:31 AM  
 Lactic acid 0.5 09/17/2018 04:31 AM  
 Lactic Acid (POC) 2.1 (H) 09/16/2018 08:18 PM  
  
Estimated Creatinine Clearance: 113.5 mL/min (based on Cr of 0.31). Day 1 of vancomycin. Goal trough is 15-20. Dosing started with 1g x 1 and then 750 mg q18h. Will continue to follow patient. Thank you, Jeremiah Jesus Pharm. D. Clinical Pharmacist 
[phone]

## 2018-09-17 NOTE — PROGRESS NOTES
Initial visit by  to convey care and concern and encourage patient that  services are available if desired. Knocked on door but heard no response. Provided business card at door for future reference. Chaplains remain available for follow-up if desired. Frank May MDiv Board Certified [employer]

## 2018-09-17 NOTE — PROGRESS NOTES
Per Nurse Diane Silva CT exam will be ordered at this time. Nurse will notify us if MRI still needs to be done

## 2018-09-17 NOTE — PROGRESS NOTES
Problem: Falls - Risk of 
Goal: *Absence of Falls Document Elliott Wilde Fall Risk and appropriate interventions in the flowsheet. Outcome: Progressing Towards Goal 
Fall Risk Interventions: 
Mobility Interventions: Bed/chair exit alarm, Patient to call before getting OOB, PT Consult for mobility concerns Mentation Interventions: Bed/chair exit alarm Medication Interventions: Bed/chair exit alarm, Patient to call before getting OOB, Teach patient to arise slowly Elimination Interventions: Bed/chair exit alarm, Call light in reach History of Falls Interventions: Bed/chair exit alarm, Room close to nurse's station

## 2018-09-17 NOTE — PROGRESS NOTES
TRANSFER - IN REPORT: 
 
Verbal report received at 2355 on 09/16/18 from Denver Favre, RN on Jose Martin Hires  being received from ER for routine progression of care Report consisted of patients Situation, Background, Assessment and  
Recommendations(SBAR). Information from the following report(s) SBAR, Kardex and ED Summary was reviewed with the receiving nurse. Opportunity for questions and clarification was provided. Unable to complete admission database. Pt unable to answer questions secondary to confusion. Completed as much as able to and will attempt to call  in am to complete database.

## 2018-09-17 NOTE — PROGRESS NOTES
DIANNE met with family friend Chucho Zuleta [phone], she stated that she has been helping take care of patient. She stated that she has been helping to clean and assist where needed around the house. Apperantly, pt was more confused over the weekend and she with another family friend Ms. Manish Shannon, brought pt to emergency room. She stated that house was in disarray and pt  needs assistance himself at home. She alleged pt's son is possibly abusing drugs, but, is not sure if he is taking pt's pain meds or not. She did say that pt spouse has home made urinals around the home that have not been emptied and the house smells of feces and urine. It is unclear what the spouses limitations are. After speaking to several sources, DIANNE called [facility] to report condition. Report was made to [phone]. DIANNE will continue to follow up on discharge planning.

## 2018-09-17 NOTE — PROGRESS NOTES
09/17/18 0120 Dual Skin Pressure Injury Assessment Second Care Provider (Based on [address] [place] [address]) Carlos Izaguirre RN Admission dual skin assessment performed. Pt with what appears to be stage 4 sacral decubitus ulcer. Opening is approximately 4cm x 4cm. Tunneling in all directions. Approximately tunnels 3cm in all directions. Purulent drainage noted, wound culture obtained and sent to lab. Awaiting wound and surgical consult. Currently placed large Allevyn over sacrum with entire wound covered. Pt tolerated fair. Also, large ecchymosis to left knee and multiple scattered ecchymosis to BUE. Area of skin to sternal region with redness & skin flaking. Pt has unclean odor as if not bathed in several days. Placed gown on pt and positioned on left side with pillows. Bed alarm enabled.

## 2018-09-17 NOTE — PROGRESS NOTES
DIANNE met with pt at bedside. She is not oriented to time or place. DIANNE called and spoke with spouse Annita Cruz [phone]. He stated that he is on O2 and in a wheelchair. SW discussed pateint care with  Spouse. Pt has sacral wound that may need surgical intervention. SW inquired about home dynamics. Son lives with them. Per spouse, he is helpful. Per other reports in chart, son may be hindering tx with alleged substance abuse. DIANNE called and spoke with rep from [address] [place] [address], where patient was on services up until it was revoked by spouse yesterday, due to hospital admission. She stated pt needed to be in a nursing home. DIANNE also spoke with lj Freeman [phone], who stated she goes to see patient 5x a week and the nurse comes 3x a week. SHe stated she has been taking care of patient and did not know of any additional concerns in the home. Recommendations for SNF placement were discussed with Spouse, who is in agreement. DIANNE spoke with Dr. Paul Tineo, and expressed discharge planning. DIANNE asked nurse to order PPD. DIANNE will continue to monitor and follow up with discharge planning. Care Management Interventions PCP Verified by CM: Yes Mode of Transport at Discharge: BLS Transition of Care Consult (CM Consult): SNF Partner SNF: Yes Discharge Durable Medical Equipment: No 
Speech Therapy Consult: No 
Current Support Network: Lives with Spouse, Family [facility] Plan discussed with Pt/Family/Caregiver: Yes Freedom of Choice Offered: Yes Discharge Location Discharge Placement: Home

## 2018-09-17 NOTE — PROGRESS NOTES
END OF SHIFT NOTE: 
 
Intake/Output 
09/17 0701 - 09/17 1900 In: 4242 [P.O.:875; I.V.:882] Out: 1 [Urine:1] Voiding: YES Catheter: NO 
Drain:   
 
 
 
 
 
Stool:  1 occurrences. Emesis:  0 occurrences. VITAL SIGNS Patient Vitals for the past 12 hrs: 
 Temp Pulse Resp BP SpO2  
09/17/18 1600 98.6 °F (37 °C) 70 20 124/64 97 % 09/17/18 1245 - - - 142/73 -  
09/17/18 1130 98 °F (36.7 °C) 83 24 (!) 115/101 -  
09/17/18 0750 97.4 °F (36.3 °C) 74 19 147/81 - Pain Assessment Pain 1 Pain Scale 1: Numeric (0 - 10) (09/17/18 1500) Pain Intensity 1: 0 (09/17/18 1500) Patient Stated Pain Goal: 0 (09/17/18 1500) Pain Reassessment 1: Patient sleeping (09/17/18 1245) Ambulating Yes with assist.  
 
Additional Information: Wound care performed today per MD orders. SW following Pt needs assistance eating. Confused, bed alarm in place. Vit K PO given today for INR 4.9. Pacemaker Shift report given to oncoming nurse at the bedside.  
 
Marli Thompson RN

## 2018-09-17 NOTE — PROGRESS NOTES
Warfarin dosing per pharmacist 
 
Merlin Bautista is a 75 y.o. female. Height: 5' (152.4 cm)    Weight: 41.4 kg (91 lb 4.8 oz) Indication:  Afib Goal INR:  2-3 Home dose:  5 mg daily Risk factors or significant drug interactions:  n/a Other anticoagulants:  none Daily Monitoring Date  INR     Warfarin dose HGB              Notes 9/16  4.7  Hold  10.0 
9/17  4.9  Hold  8.6 Pharmacy consulted to dose. INR 4.9. Warfarin on hold currently. Following daily INR and will look to restart when INR <3 unless surgical procedure pursued. Thank you, Isabel Castillo, Pharm. D. Clinical Pharmacist 
[phone]

## 2018-09-18 PROBLEM — E43 SEVERE PROTEIN-CALORIE MALNUTRITION (HCC): Status: ACTIVE | Noted: 2018-09-18

## 2018-09-18 LAB
ANION GAP SERPL CALC-SCNC: 6 MMOL/L (ref 7–16)
BASOPHILS # BLD: 0 K/UL (ref 0–0.2)
BASOPHILS NFR BLD: 1 % (ref 0–2)
BUN SERPL-MCNC: 11 MG/DL (ref 8–23)
CALCIUM SERPL-MCNC: 8.3 MG/DL (ref 8.3–10.4)
CHLORIDE SERPL-SCNC: 105 MMOL/L (ref 98–107)
CO2 SERPL-SCNC: 31 MMOL/L (ref 21–32)
CREAT SERPL-MCNC: 0.37 MG/DL (ref 0.6–1)
DIFFERENTIAL METHOD BLD: ABNORMAL
EOSINOPHIL # BLD: 0 K/UL (ref 0–0.8)
EOSINOPHIL NFR BLD: 0 % (ref 0.5–7.8)
ERYTHROCYTE [DISTWIDTH] IN BLOOD BY AUTOMATED COUNT: 15.9 %
GLUCOSE SERPL-MCNC: 95 MG/DL (ref 65–100)
HCT VFR BLD AUTO: 33.3 % (ref 35.8–46.3)
HGB BLD-MCNC: 10.4 G/DL (ref 11.7–15.4)
IMM GRANULOCYTES # BLD: 0 K/UL (ref 0–0.5)
IMM GRANULOCYTES NFR BLD AUTO: 0 % (ref 0–5)
INR PPP: 1.8
LYMPHOCYTES # BLD: 1.7 K/UL (ref 0.5–4.6)
LYMPHOCYTES NFR BLD: 24 % (ref 13–44)
MCH RBC QN AUTO: 29.1 PG (ref 26.1–32.9)
MCHC RBC AUTO-ENTMCNC: 31.2 G/DL (ref 31.4–35)
MCV RBC AUTO: 93.3 FL (ref 79.6–97.8)
MONOCYTES # BLD: 0.6 K/UL (ref 0.1–1.3)
MONOCYTES NFR BLD: 8 % (ref 4–12)
NEUTS SEG # BLD: 4.9 K/UL (ref 1.7–8.2)
NEUTS SEG NFR BLD: 67 % (ref 43–78)
NRBC # BLD: 0 K/UL (ref 0–0.2)
PLATELET # BLD AUTO: 361 K/UL (ref 150–450)
PMV BLD AUTO: 9.5 FL (ref 9.4–12.3)
POTASSIUM SERPL-SCNC: 4.2 MMOL/L (ref 3.5–5.1)
PROTHROMBIN TIME: 20.2 SEC (ref 11.5–14.5)
RBC # BLD AUTO: 3.57 M/UL (ref 4.05–5.2)
SODIUM SERPL-SCNC: 142 MMOL/L (ref 136–145)
WBC # BLD AUTO: 7.3 K/UL (ref 4.3–11.1)

## 2018-09-18 PROCEDURE — 65270000029 HC RM PRIVATE

## 2018-09-18 PROCEDURE — 85025 COMPLETE CBC W/AUTO DIFF WBC: CPT

## 2018-09-18 PROCEDURE — 85610 PROTHROMBIN TIME: CPT

## 2018-09-18 PROCEDURE — 36415 COLL VENOUS BLD VENIPUNCTURE: CPT

## 2018-09-18 PROCEDURE — 74011250636 HC RX REV CODE- 250/636: Performed by: INTERNAL MEDICINE

## 2018-09-18 PROCEDURE — 74011000258 HC RX REV CODE- 258: Performed by: INTERNAL MEDICINE

## 2018-09-18 PROCEDURE — 99231 SBSQ HOSP IP/OBS SF/LOW 25: CPT | Performed by: NURSE PRACTITIONER

## 2018-09-18 PROCEDURE — 77030020253 HC SOL INJ D545NS .05 DEX .45 SAL

## 2018-09-18 PROCEDURE — 74011250637 HC RX REV CODE- 250/637: Performed by: INTERNAL MEDICINE

## 2018-09-18 PROCEDURE — 74011000250 HC RX REV CODE- 250: Performed by: INTERNAL MEDICINE

## 2018-09-18 PROCEDURE — 80048 BASIC METABOLIC PNL TOTAL CA: CPT

## 2018-09-18 PROCEDURE — 80202 ASSAY OF VANCOMYCIN: CPT

## 2018-09-18 RX ORDER — HALOPERIDOL 5 MG/ML
2 INJECTION INTRAMUSCULAR ONCE
Status: COMPLETED | OUTPATIENT
Start: 2018-09-18 | End: 2018-09-18

## 2018-09-18 RX ADMIN — METOPROLOL SUCCINATE 25 MG: 25 TABLET, EXTENDED RELEASE ORAL at 09:10

## 2018-09-18 RX ADMIN — CARBIDOPA AND LEVODOPA 2 TABLET: 25; 100 TABLET, EXTENDED RELEASE ORAL at 21:42

## 2018-09-18 RX ADMIN — GABAPENTIN 100 MG: 100 CAPSULE ORAL at 09:10

## 2018-09-18 RX ADMIN — VANCOMYCIN HYDROCHLORIDE 750 MG: 10 INJECTION, POWDER, LYOPHILIZED, FOR SOLUTION INTRAVENOUS at 04:26

## 2018-09-18 RX ADMIN — DAKIN'S SOLUTION 0.125% (QUARTER STRENGTH): 0.12 SOLUTION at 21:41

## 2018-09-18 RX ADMIN — LORAZEPAM 1 MG: 2 INJECTION INTRAMUSCULAR; INTRAVENOUS at 13:06

## 2018-09-18 RX ADMIN — FAMOTIDINE 20 MG: 10 INJECTION, SOLUTION INTRAVENOUS at 08:09

## 2018-09-18 RX ADMIN — Medication 1 AMPULE: at 21:42

## 2018-09-18 RX ADMIN — MORPHINE SULFATE 2 MG: 2 INJECTION, SOLUTION INTRAMUSCULAR; INTRAVENOUS at 13:13

## 2018-09-18 RX ADMIN — DEXTROSE MONOHYDRATE AND SODIUM CHLORIDE 75 ML/HR: 5; .45 INJECTION, SOLUTION INTRAVENOUS at 08:07

## 2018-09-18 RX ADMIN — CARBIDOPA AND LEVODOPA 1 TABLET: 25; 250 TABLET ORAL at 10:41

## 2018-09-18 RX ADMIN — Medication 5 ML: at 21:42

## 2018-09-18 RX ADMIN — GABAPENTIN 100 MG: 100 CAPSULE ORAL at 21:42

## 2018-09-18 RX ADMIN — SODIUM CHLORIDE 1 G: 900 INJECTION, SOLUTION INTRAVENOUS at 21:24

## 2018-09-18 RX ADMIN — HALOPERIDOL LACTATE 2 MG: 5 INJECTION, SOLUTION INTRAMUSCULAR at 21:23

## 2018-09-18 RX ADMIN — DAKIN'S SOLUTION 0.125% (QUARTER STRENGTH): 0.12 SOLUTION at 08:11

## 2018-09-18 RX ADMIN — Medication 5 ML: at 13:02

## 2018-09-18 RX ADMIN — SODIUM CHLORIDE 1 G: 900 INJECTION, SOLUTION INTRAVENOUS at 08:07

## 2018-09-18 RX ADMIN — Medication 10 ML: at 08:09

## 2018-09-18 RX ADMIN — Medication 1 AMPULE: at 08:09

## 2018-09-18 RX ADMIN — CARBIDOPA AND LEVODOPA 1 TABLET: 25; 250 TABLET ORAL at 06:52

## 2018-09-18 NOTE — PROGRESS NOTES
Warfarin dosing per pharmacist 
 
Anne Sprague is a 75 y.o. female. Height: 5' (152.4 cm)    Weight: 41.4 kg (91 lb 4.8 oz) Indication:  Afib Goal INR:  2-3 Home dose:  5 mg daily Risk factors or significant drug interactions:  n/a Other anticoagulants:  none Daily Monitoring Date  INR     Warfarin dose HGB              Notes 9/16  4.7  Hold  10.0 
9/17  4.9  Hold  8.6  Vit K 10 mg PO x 1 
9/18  1.8  Hold  10.4 Pharmacy consulted to dose. INR down to 1.8 after 10 mg PO vitamin k yesterday. Continue to hold for possible debridement. Following daily INR. Thank you, Lam Reddy, Pharm. D. Clinical Pharmacist 
[phone]

## 2018-09-18 NOTE — PROGRESS NOTES
SW met with family friend at bedside, Ms. Clary Valentine and Ms. Bae. They have been taking care of patient when she was at home. SW will continue to monitor discharge planning and follow up.

## 2018-09-18 NOTE — PROGRESS NOTES
END OF SHIFT NOTE: 
 
Intake/Output Voiding: YES Catheter: YES Drain:   
 
 
 
 
 
Stool:  0 occurrences. Emesis:  0 occurrences. VITAL SIGNS Patient Vitals for the past 12 hrs: 
 Temp Pulse Resp BP SpO2  
09/18/18 0749 98.4 °F (36.9 °C) 73 20 125/66 95 % 09/18/18 0435 98 °F (36.7 °C) 76 22 144/79 98 % 09/17/18 2222 98.2 °F (36.8 °C) 75 20 (!) 143/103 98 % Pain Assessment Pain 1 Pain Scale 1: Numeric (0 - 10) (09/17/18 1500) Pain Intensity 1: 0 (09/17/18 1500) Patient Stated Pain Goal: 0 (09/17/18 1500) Pain Reassessment 1: Patient sleeping (09/17/18 1245) Ambulating No 
 
Additional Information: Pt is confused but pleasant, pulled out iv in left arm. IV in rt arm is still infusing. No pain meds required. Pt needed to be changed several times,prompting dressing change on sacral wound as dressing became soaked. Pure Wick placed in efforts to keep pt and dressing dry. VSS, no needs voiced at this time. Shift report given to oncoming nurse, Shea Chambers RN, at the bedside. Jesús Hernadez

## 2018-09-18 NOTE — PROGRESS NOTES
Problem: Nutrition Deficit Goal: *Optimize nutritional status Nutrition Reason for assessment: Referral received for pressure injury. Assessment:  
Diet order(s): mechanical soft with ensure enlive TID Food/Nutrition Patient History:  Pt seen by RD yesterday. Skin: Stage III pressure injury to sacrum/coccyx. Potential for debridement. Anthropometrics:Height: 5' (152.4 cm),  Weight: 41.4 kg (91 lb 4.8 oz) Macronutrient needs:*adjusted protein needs for wound healing EER:  6611-4002 kcal /day (30-35 kcal/kg listed BW) EPR:  62-83 grams protein/day (1.5-2.0 grams/kg listed BW) Intake/Comparative Standards: Three recorded meal intakes over 2 days averaging 67% and potentially meeting 100% of calorie and protein needs.  
  
Nutrition Diagnosis: Increased protein needs r/t wound healing as evidenced by pt with stage III pressure injury to sacrum/coccyx. 
  
Intervention: 
Meals and snacks: Continue current diet. Nutrition Supplement Therapy: continue ensure enlive TID Pt will continue to need assistance with meals. Would benefit from MVI with Zn and Vit C for wound healing. Discharge nutrition plan: Too soon to determine. Most likely meal assistance and supplementation as needed at discharge.  
  
Antonio Rascon [address], [address] [place] [address], [address] [place] [address], [phone]
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
## 2018-09-18 NOTE — PROGRESS NOTES
0705-Bedside report received from Tamiko Chao RN. Resting in bed. No needs voiced. No s/s of acute distress. 1320-Dr. Shemar Valentine notified of pt's agitation and pt trying to bite and hit staff. He stated to order haldol 2 mg IV OT. 
1810-END OF SHIFT NOTE: 
Pt's VSS and is in no acute distress. Pt awaiting long term placement for nursing home. Pt has stage 3 pressure ulcer to sacrum with dressing changes bid. Intake/Output 
09/18 0701 - 09/18 1900 In: 5841 [P.O.:590; I.V.:556] Out: 600 [Urine:600] Voiding: YES Catheter: NO 
Drain:  
External Female Catheter 09/18/18 (Active) Site Assessment Clean, dry, & intact 9/18/2018  1:34 PM  
Repositioned Yes 9/18/2018  1:34 PM  
Perineal Care Yes 9/18/2018  1:34 PM  
Wick Changed Yes 9/18/2018  1:34 PM  
Suction Canister/Tubing Changed No 9/18/2018  1:34 PM  
Urine Output (mL) 600 ml 9/18/2018  9:35 AM  
 
 
Stool:  0 occurrences. Emesis:  0 occurrences. VITAL SIGNS Patient Vitals for the past 12 hrs: 
 Temp Pulse Resp BP SpO2  
09/18/18 1615 98.6 °F (37 °C) 70 16 132/72 99 % 09/18/18 1515 98.4 °F (36.9 °C) 79 19 123/66 100 % 09/18/18 1119 98.2 °F (36.8 °C) 75 19 152/44 100 % 09/18/18 0749 98.4 °F (36.9 °C) 73 20 125/66 95 % Pain Assessment Pain 1 Pain Scale 1: Visual (09/18/18 1335) Pain Intensity 1: 0 (09/18/18 1335) Patient Stated Pain Goal: 0 (09/17/18 1500) Pain Reassessment 1: Patient sleeping (09/17/18 1245) Ambulating No 
 
Jacob Betancourt [facility] 1850-Bedside shift change report given to Ifeanyi Gibson RN (oncoming nurse) by Stevenson Calvillo RN (offgoing nurse). Report included the following information SBAR, Kardex, Intake/Output, MAR and Recent Results.

## 2018-09-18 NOTE — PROGRESS NOTES
Neurology Daily Progress Note Assessment:  
 
Parkinson's disease Plan:  
 
Continue current doses of Sinemet (Sinemet  QID and Sinemet ER  2 tablets qhs). No changes necessary in inpatient setting.  
  
Patient is seen by Dr. Parth Alvarez and should follow up with her in clinic after discharge Subjective: Interval history: 
 
Patient's friends who brought her to the hospital and assist in her care are present at bedside today. They report that her tremor is improved. They note a difference when medications are administered regularly and think patient's  may have difficulty with managing her medications at home. Otherwise, the patient remains neurologically unchanged today. Primary team/case management is currently working on dispo for the patient. History: 
 
Komal Cleveland is a 75 y.o. female who is being seen for tremor. There is no family at bedside and the patient is unable to provide history. HPI is obtain from RN and previous notes. The patient was brought to the ED on 09/16 by friends after it was felt that her mental status was acutely worse. There was also concern for fever. It was noted that her tremor has been worse recently. She has an underlying history of dementia and PD. The patient was found to have a large sacral decubitus ulcer with concern for sepsis and concerns for malnutrition, dehydration. The friends who brought the patient to the ED expressed concern that she may not be getting the care she needs, specifically may not be getting/taking medications appropriately. She has a history of Afib and INR was supratherapeutic on admission.  
  
On initial exam, the patient is noted to have intention tremor in bilateral upper extremities. She is oriented only to person.  Per RN, the patient is prescribed Sinemet, however there was concern she had not been taking appropriate dose, as the patient had prescription bottles with several different doses. RN plans to reconcile medications with patient's  today. She is a current patient of Dr. Man Warren, last seen in April. There was concern previously by Dr. Man Warren that the patient may not be taking medications appropriately. ROS unable to be obtained Objective:  
 
Vitals:  
 09/18/18 0435 09/18/18 0749 09/18/18 1119 09/18/18 1515 BP: 144/79 125/66 152/44 123/66 Pulse: 76 73 75 79 Resp: 22 20 19 19 Temp: 98 °F (36.7 °C) 98.4 °F (36.9 °C) 98.2 °F (36.8 °C) 98.4 °F (36.9 °C) SpO2: 98% 95% 100% 100% Weight:      
Height:      
  
 
 
Current Facility-Administered Medications:   Vancomycin Trough Level Reminder, , Other, ONCE, Cole Vasquez MD 
  haloperidol lactate (HALDOL) injection 2 mg, 2 mg, IntraVENous, ONCE, Lonn Minors. Allyssa Penn MD 
  carbidopa-levodopa (SINEMET)  mg per tablet 1 Tab, 1 Tab, Oral, QID, Angela Sears MD, 1 Tab at 09/18/18 1041   carbidopa-levodopa ER (SINEMET CR)  mg per tablet 2 Tab, 2 Tab, Oral, QHS, Jose R Luci Lanes, MD, 2 Tab at 09/17/18 2211 
  gabapentin (NEURONTIN) capsule 100 mg, 100 mg, Oral, TID, Angela Sears MD, 100 mg at 09/18/18 0910 
  metoprolol succinate (TOPROL-XL) XL tablet 25 mg, 25 mg, Oral, DAILY, Angela Sears MD, 25 mg at 09/18/18 4322   famotidine (PF) (PEPCID) 20 mg in sodium chloride 0.9% 10 mL injection, 20 mg, IntraVENous, DAILY, Jose R Luci Lanes, MD, 20 mg at 09/18/18 7134   LORazepam (ATIVAN) injection 1 mg, 1 mg, IntraVENous, Q4H PRN, Angela Sears MD, 1 mg at 09/18/18 1306   sodium chloride (NS) flush 5 mL, 5 mL, InterCATHeter, Q8H, Jose R Luci Lanes, MD, 5 mL at 09/18/18 1302   sodium chloride (NS) flush 5-10 mL, 5-10 mL, InterCATHeter, PRN, Angela Sears MD 
  ondansetron [facility]) injection 4 mg, 4 mg, IntraVENous, Q6H PRN, Angela Sears MD 
   acetaminophen (TYLENOL) tablet 650 mg, 650 mg, Oral, Q6H PRN, Raj Spence MD 
  morphine injection 2 mg, 2 mg, IntraVENous, Q4H PRN, Raj Spence MD, 2 mg at 09/18/18 1313 
  oxyCODONE IR (ROXICODONE) tablet 5 mg, 5 mg, Oral, Q6H PRN, Raj Sepnce MD 
  warfarin (COUMADIN) tablet 5 mg - On Hold, 5 mg, Oral, See Admin Instructions, Raj Spence MD 
  alcohol 62% (NOZIN) nasal  1 Ampule, 1 Ampule, Topical, Q12H, Yg Sam MD, 1 Ampule at 09/18/18 6568   cefepime (MAXIPIME) 1 g in 0.9% sodium chloride (MBP/ADV) 50 mL ADV, 1 g, IntraVENous, Q12H, Cole Lynch MD, 1 g at 09/18/18 2992   influenza vaccine 2018-19 (6 mos+)(PF) (FLUARIX QUAD/FLULAVAL QUAD) injection 0.5 mL, 0.5 mL, IntraMUSCular, PRIOR TO DISCHARGE, Yg Jerry MD 
  sodium hypochlorite (QUARTER STRENGTH DAKIN'S) 0.125% irrigation (bottle), , Topical, BID, NIHARIKA Bryant 
  dextrose 5 % - 0.45% NaCl infusion, 75 mL/hr, IntraVENous, CONTINUOUS, Cole Lynch MD, Last Rate: 75 mL/hr at 09/18/18 0807, 75 mL/hr at 09/18/18 0807 
  vancomycin (VANCOCIN) 750 mg in  ml infusion, 750 mg, IntraVENous, Q18H, Bing Berumen MD, Last Rate: 250 mL/hr at 09/18/18 0426, 750 mg at 09/18/18 0426 No results found for this or any previous visit (from the past 12 hour(s)). Physical Exam: 
General - Thin, chronically ill appearing female. HEENT - Normocephalic, atraumatic. Conjunctiva and oropharynx are clear. Neck - Supple without masses Extremities - Peripheral pulses intact. Multiple bruises noted  
  
Neurological examination - Oriented to person only. Pleasantly confused. Language and speech appear normal. On cranial nerve examination pupils are equal round and reactive to light. Extraocular motility is normal. Face is symmetric and sensation is intact to light touch.  Hearing is intact to finger rustle bilaterally. Motor examination - Normal tone. Decreased muscle bulk. Power is full throughout. Hyperreflexic diffusley. Intention tremor in BUE. Sensation is intact to light touch in all extremities. Gait not examined. Signed By: Joana Hare NP September 18, 2018

## 2018-09-18 NOTE — PROGRESS NOTES
SW received call back from APS. They called to verify information given on yesterday. SW will continue to monitor and follow up.

## 2018-09-18 NOTE — PROGRESS NOTES
Pt. Agitated when taking blood pressure (extremly tremulous and complaining of blood pressure cuff). Took blood pressure with manual cuff. Pt. Calmed by holding her hands and singing to her.

## 2018-09-18 NOTE — PROGRESS NOTES
H&P/Consult Note/Progress Note/Office Note:  
Claudia Delaney  MRN: [id number]  :1950  Age:67 y.o. 
 
HPI: Claudia Delaney is a 75 y.o. female who has PMHx of Parkinson disease, a fib on Coumadin s/p PPM, HTn, HLD, tremors, meningioma who presented to ED on the night of 18 with increased confusion, agitation, and possible fever per a friend and home health. She lives at home with  who is wheelchair bound and has hospice and home health. On admission she was found to have INR 4.9 and sacral ulcer with purulent drainage. Tmax 99.4, tachycardic, on room air. She was started on IV Cefepime and Vanco. Based on ED notes, there is some concern that pt is not receiving adequate care in receiving her meds and diet. No family or friends have been present at bedside for out initial consultation. Cultures and MRI for osteomyelitis are pending. 18 No Interval changes. confused Past Medical History:  
Diagnosis Date  Arrhythmia 2016  Atrial fibrillation ([address] 2015  Dry eye  Heart disease 2016  High blood pressure 2016  History of basal cell cancer 2016  Hyperlipidemia 2016  Hypertension 2016  Presence of cardiac pacemaker 2015  Tremor 2016 Past Surgical History:  
Procedure Laterality Date  HX COLONOSCOPY  2011  HX HEART CATHETERIZATION    
 HX OTHER SURGICAL Cardiac Electrophysiology Mapping and Ablation  HX OTHER SURGICAL Lung Biopsy  HX OTHER SURGICAL  1982  
 skin cancer excision, basal cell carcinoma on forehead  HX OTHER SURGICAL   Cardiac pacemaker placement  HX PARTIAL HYSTERECTOMY Abdominal   
 HX SALPINGO-OOPHORECTOMY Laparoscopic, with MARIA ELENA  
 HX TONSILLECTOMY Current Facility-Administered Medications Medication Dose Route Frequency  Vancomycin Trough Level Reminder   Other ONCE  
  carbidopa-levodopa (SINEMET)  mg per tablet 1 Tab  1 Tab Oral QID  carbidopa-levodopa ER (SINEMET CR)  mg per tablet 2 Tab  2 Tab Oral QHS  gabapentin (NEURONTIN) capsule 100 mg  100 mg Oral TID  metoprolol succinate (TOPROL-XL) XL tablet 25 mg  25 mg Oral DAILY  famotidine (PF) (PEPCID) 20 mg in sodium chloride 0.9% 10 mL injection  20 mg IntraVENous DAILY  LORazepam (ATIVAN) injection 1 mg  1 mg IntraVENous Q4H PRN  
 sodium chloride (NS) flush 5 mL  5 mL InterCATHeter Q8H  
 sodium chloride (NS) flush 5-10 mL  5-10 mL InterCATHeter PRN  
 ondansetron (ZOFRAN) injection 4 mg  4 mg IntraVENous Q6H PRN  
 acetaminophen (TYLENOL) tablet 650 mg  650 mg Oral Q6H PRN  
 morphine injection 2 mg  2 mg IntraVENous Q4H PRN  
 oxyCODONE IR (ROXICODONE) tablet 5 mg  5 mg Oral Q6H PRN  
 warfarin (COUMADIN) tablet 5 mg - On Hold  5 mg Oral See Admin Instructions  alcohol 62% (NOZIN) nasal  1 Ampule  1 Ampule Topical Q12H  cefepime (MAXIPIME) 1 g in 0.9% sodium chloride (MBP/ADV) 50 mL ADV  1 g IntraVENous Q12H  
 influenza vaccine 2018-19 (6 mos+)(PF) (FLUARIX QUAD/FLULAVAL QUAD) injection 0.5 mL  0.5 mL IntraMUSCular PRIOR TO DISCHARGE  sodium hypochlorite (QUARTER STRENGTH DAKIN'S) 0.125% irrigation (bottle)   Topical BID  dextrose 5 % - 0.45% NaCl infusion  75 mL/hr IntraVENous CONTINUOUS  
 vancomycin (VANCOCIN) 750 mg in  ml infusion  750 mg IntraVENous Q18H Codeine; Penicillins; and Sulfa (sulfonamide antibiotics) Social History Social History  Marital status:  Spouse name: N/A  
 Number of children: N/A  
 Years of education: N/A Social History Main Topics  Smoking status: Never Smoker  Smokeless tobacco: Never Used  Alcohol use No  
 Drug use: No  
 Sexual activity: Not Asked Other Topics Concern  None Social History Narrative History Smoking Status  Never Smoker Smokeless Tobacco  
  Never Used Family History Problem Relation Age of Onset  Heart Disease Mother  Thyroid Disease Mother  Heart Disease Father  Cancer Father Lung Cancer ROS: Limited by patients mental status. Subjective fever. Comprehensive review of systems was otherwise unremarkable except as noted above. Physical Exam:  
Visit Vitals  /66 (BP 1 Location: Left arm, BP Patient Position: At rest)  Pulse 73  Temp 98.4 °F (36.9 °C)  Resp 20  
 Ht 5' (1.524 m)  Wt 91 lb 4.8 oz (41.4 kg)  LMP  (LMP Unknown)  SpO2 95%  Breastfeeding No  
 BMI 17.83 kg/m2 Constitutional: Alert, oriented to self only, cooperative patient in no acute distress; appears stated age Eyes:Sclera are clear. EOMs intact ENMT: no external lesions gross hearing normal; no obvious neck masses, no ear or lip lesions, nares normal 
CV: regular rate Resp: No JVD. Breathing is  non-labored; no audible wheezing. CTAB. GI: soft and non-distended, non tender. + BS. Musculoskeletal: severe bilateral UE tremor Skin: stage 3-4 sacral ulcer. Wound bed is relatively clean with pink tissue, no foul smell, slough or eschar, with small amount of purulent material at 2 O'Clock position. ~2cm circumferential undermining from the 4 O'Clock to 1 O'Clock position. Wound is tender. Bone palpable but not visible, covered with very thin layer of tissue. Neuro: Moves all 4, severe tremors Psychiatric: + memory impairment Recent vitals (if inpt): 
Patient Vitals for the past 24 hrs: 
 BP Temp Pulse Resp SpO2  
09/18/18 0749 125/66 98.4 °F (36.9 °C) 73 20 95 % 09/18/18 0435 144/79 98 °F (36.7 °C) 76 22 98 % 09/17/18 2222 (!) 143/103 98.2 °F (36.8 °C) 75 20 98 % 09/17/18 1945 143/75 97.6 °F (36.4 °C) 71 18 100 % 09/17/18 1600 124/64 98.6 °F (37 °C) 70 20 97 % 09/17/18 1245 142/73 - - - -  
09/17/18 1130 (!) 115/101 98 °F (36.7 °C) 83 24 - Labs: 
Recent Labs  
   09/18/18 
 0414  09/17/18 3342 WBC  7.3  7.3 HGB  10.4*  8.6*  
PLT  361  285 NA  142  141  
K  4.2  4.0  
CL  105  106 CO2  31  27 BUN  11  24* CREA  0.37*  0.31* GLU  95  82 PTP  20.2*  43.5* INR  1.8  4.9* TBILI   --   0.3 SGOT   --   29 ALT   --   7* AP   --   80 Lab Results Component Value Date/Time WBC 7.3 09/18/2018 04:14 AM  
 HGB 10.4 (L) 09/18/2018 04:14 AM  
 PLATELET 515 09/57/7560 04:14 AM  
 Sodium 142 09/18/2018 04:14 AM  
 Potassium 4.2 09/18/2018 04:14 AM  
 Chloride 105 09/18/2018 04:14 AM  
 CO2 31 09/18/2018 04:14 AM  
 BUN 11 09/18/2018 04:14 AM  
 Creatinine 0.37 (L) 09/18/2018 04:14 AM  
 Glucose 95 09/18/2018 04:14 AM  
 INR 1.8 09/18/2018 04:14 AM  
 aPTT 49.3 (H) 02/06/2018 01:04 AM  
 Bilirubin, total 0.3 09/17/2018 04:31 AM  
 AST (SGOT) 29 09/17/2018 04:31 AM  
 ALT (SGPT) 7 (L) 09/17/2018 04:31 AM  
 Alk. phosphatase 80 09/17/2018 04:31 AM  
 Ammonia <10 (L) 02/06/2018 01:04 AM  
 Troponin-I, Qt. <0.02 (L) 04/29/2018 05:34 AM  
 
 
I reviewed recent labs and recent radiologic studies. CT Results (most recent): 
 
Results from Hospital Encounter encounter on 09/16/18 CT PELV W CONT Narrative CT of the pelvis INDICATION:  Sacral decubitus ulcer Multiple axial images were obtained through the pelvis after intravenous 
injection of 100mL of Isovue 370. Radiation dose reduction techniques were used 
for this study: All CT scans performed at this facility use one or all of the 
following: Automated exposure control, adjustment of the mA and/or kVp according 
to patient's size, iterative reconstruction. FINDINGS: There is a decubitus ulcer overlying the coccyx. The ulcer does 
extend to the bone. There is no definite erosion of the adjacent bone. There 
is no CT evidence of osteomyelitis. There are no fractures. There is 
degenerative change in the lower lumbar spine. There is mild prominence of the stool pattern. There are no focal inflammatory changes or fluid collections. There is mild diffuse atherosclerosis. Impression IMPRESSION: 
1. Sacral decubitus ulcer. No evidence of osteomyelitis. 2.  Constipation. XR Results (most recent): 
 
Results from Hospital Encounter encounter on 09/16/18 XR CHEST PORT Narrative Portable chest xray COMPARISON: April 29, 2018. CLINICAL HISTORY: Chest pain. FINDINGS: 
 
There is a stable left-sided cardiac pacer. Cardiac mediastinal contour is 
within normal limits. No pulmonary consolidation, pleural effusion or 
pneumothorax thorax. No pulmonary edema. Bones are osteopenic. Small calcified 
granuloma is present in the left lung. Impression IMPRESSION: 
 
No acute pulmonary disease. I independently reviewed radiology images for studies I described above or studies I have ordered. Admission date (for inpatients): 9/16/2018 * No surgery found *  * No surgery found * ASSESSMENT/PLAN: 
Problem List  Date Reviewed: 11/6/2017 Codes Class Noted Debility ICD-10-CM: R53.81 ICD-9-CM: 799.3  9/17/2018 Metabolic encephalopathy GPS-82-KX: G93.41 
ICD-9-CM: 348.31  9/16/2018 * (Principal)Sepsis ([facility] ICD-10-CM: A41.9 ICD-9-CM: 038.9, 995.91  9/16/2018 Pressure ulcer of sacral region, stage 3 (HCC) ICD-10-CM: H27.051 ICD-9-CM: 707.03, 707.23  9/16/2018 Tremors of nervous system ICD-10-CM: R25.1 ICD-9-CM: 781.0  2/6/2018 Meningioma ([facility] ICD-10-CM: D32.9 ICD-9-CM: 225.2  2/6/2018 Parkinson disease [facility]) ICD-10-CM: G20 
ICD-9-CM: 332.0  8/15/2016 RBD (REM behavioral disorder) ICD-10-CM: G47.52 
ICD-9-CM: 327.42  8/15/2016 Hypertension ICD-10-CM: I10 
ICD-9-CM: 401.9  7/14/2016 Hyperlipidemia ICD-10-CM: E78.5 ICD-9-CM: 272.4  7/14/2016 History of basal cell cancer ICD-10-CM: L35.097 ICD-9-CM: V10.83  7/14/2016 Atrial fibrillation ([facility] ICD-10-CM: I48.91 
ICD-9-CM: 427.31  8/24/2015 Presence of cardiac pacemaker ICD-10-CM: Z95.0 ICD-9-CM: V45.01  8/24/2015 Principal Problem: 
  Sepsis ([facility] (9/16/2018) Active Problems: 
  Parkinson disease ([facility] (8/15/2016) Metabolic encephalopathy (9/75/8695) Pressure ulcer of sacral region, stage 3 ([facility] (9/16/2018) Debility (9/17/2018) Plan: 
Debilitated Sepsis improved with IV Abx Continue to hold coumadin; INR 4.9-->1.8 Daily dressing changes and air mattress for now Consider surgical debridement when INR appropriate for intervention Will see if she needs debridement when INR <1.4 OR if Dakins is effective enough to avoid debridement 
    wound care-- Dakins moist to dry dressing BID or PRN for soiling + air mattress Cultures pending CT pelvis showed no evidence of osteomeylitis MRI pending Signed:  Lonnie Ross MD

## 2018-09-19 PROBLEM — L89.154 PRESSURE ULCER OF SACRAL REGION, STAGE 4 (HCC): Status: ACTIVE | Noted: 2018-09-16

## 2018-09-19 LAB
ANION GAP SERPL CALC-SCNC: 5 MMOL/L (ref 7–16)
BASOPHILS # BLD: 0 K/UL (ref 0–0.2)
BASOPHILS NFR BLD: 1 % (ref 0–2)
BUN SERPL-MCNC: 11 MG/DL (ref 8–23)
CALCIUM SERPL-MCNC: 8.1 MG/DL (ref 8.3–10.4)
CHLORIDE SERPL-SCNC: 104 MMOL/L (ref 98–107)
CO2 SERPL-SCNC: 32 MMOL/L (ref 21–32)
CREAT SERPL-MCNC: 0.49 MG/DL (ref 0.6–1)
DIFFERENTIAL METHOD BLD: ABNORMAL
EOSINOPHIL # BLD: 0 K/UL (ref 0–0.8)
EOSINOPHIL NFR BLD: 0 % (ref 0.5–7.8)
ERYTHROCYTE [DISTWIDTH] IN BLOOD BY AUTOMATED COUNT: 16 %
GLUCOSE SERPL-MCNC: 115 MG/DL (ref 65–100)
HCT VFR BLD AUTO: 32.8 % (ref 35.8–46.3)
HGB BLD-MCNC: 10.2 G/DL (ref 11.7–15.4)
IMM GRANULOCYTES # BLD: 0 K/UL (ref 0–0.5)
IMM GRANULOCYTES NFR BLD AUTO: 0 % (ref 0–5)
INR PPP: 1.3
LYMPHOCYTES # BLD: 1.7 K/UL (ref 0.5–4.6)
LYMPHOCYTES NFR BLD: 31 % (ref 13–44)
MCH RBC QN AUTO: 29.4 PG (ref 26.1–32.9)
MCHC RBC AUTO-ENTMCNC: 31.1 G/DL (ref 31.4–35)
MCV RBC AUTO: 94.5 FL (ref 79.6–97.8)
MONOCYTES # BLD: 0.7 K/UL (ref 0.1–1.3)
MONOCYTES NFR BLD: 13 % (ref 4–12)
NEUTS SEG # BLD: 3.1 K/UL (ref 1.7–8.2)
NEUTS SEG NFR BLD: 56 % (ref 43–78)
NRBC # BLD: 0 K/UL (ref 0–0.2)
PLATELET # BLD AUTO: 323 K/UL (ref 150–450)
PMV BLD AUTO: 9.4 FL (ref 9.4–12.3)
POTASSIUM SERPL-SCNC: 4 MMOL/L (ref 3.5–5.1)
PROTHROMBIN TIME: 15.4 SEC (ref 11.5–14.5)
RBC # BLD AUTO: 3.47 M/UL (ref 4.05–5.2)
SODIUM SERPL-SCNC: 141 MMOL/L (ref 136–145)
VANCOMYCIN TROUGH SERPL-MCNC: 5.7 UG/ML (ref 5–20)
WBC # BLD AUTO: 5.5 K/UL (ref 4.3–11.1)

## 2018-09-19 PROCEDURE — 80048 BASIC METABOLIC PNL TOTAL CA: CPT

## 2018-09-19 PROCEDURE — 74011000258 HC RX REV CODE- 258: Performed by: INTERNAL MEDICINE

## 2018-09-19 PROCEDURE — 99231 SBSQ HOSP IP/OBS SF/LOW 25: CPT | Performed by: NURSE PRACTITIONER

## 2018-09-19 PROCEDURE — 74011000302 HC RX REV CODE- 302: Performed by: INTERNAL MEDICINE

## 2018-09-19 PROCEDURE — 74011250637 HC RX REV CODE- 250/637: Performed by: INTERNAL MEDICINE

## 2018-09-19 PROCEDURE — 74011250636 HC RX REV CODE- 250/636: Performed by: INTERNAL MEDICINE

## 2018-09-19 PROCEDURE — 77030020263 HC SOL INJ SOD CL0.9% LFCR 1000ML

## 2018-09-19 PROCEDURE — 85025 COMPLETE CBC W/AUTO DIFF WBC: CPT

## 2018-09-19 PROCEDURE — 86580 TB INTRADERMAL TEST: CPT | Performed by: INTERNAL MEDICINE

## 2018-09-19 PROCEDURE — 74011000250 HC RX REV CODE- 250: Performed by: INTERNAL MEDICINE

## 2018-09-19 PROCEDURE — 85610 PROTHROMBIN TIME: CPT

## 2018-09-19 PROCEDURE — 65270000029 HC RM PRIVATE

## 2018-09-19 PROCEDURE — 97162 PT EVAL MOD COMPLEX 30 MIN: CPT

## 2018-09-19 PROCEDURE — 77030020253 HC SOL INJ D545NS .05 DEX .45 SAL

## 2018-09-19 RX ORDER — WARFARIN SODIUM 5 MG/1
5 TABLET ORAL EVERY EVENING
Status: DISCONTINUED | OUTPATIENT
Start: 2018-09-19 | End: 2018-09-20

## 2018-09-19 RX ORDER — QUETIAPINE FUMARATE 25 MG/1
12.5 TABLET, FILM COATED ORAL 2 TIMES DAILY
Status: DISCONTINUED | OUTPATIENT
Start: 2018-09-19 | End: 2018-09-21 | Stop reason: HOSPADM

## 2018-09-19 RX ADMIN — VANCOMYCIN HYDROCHLORIDE 750 MG: 10 INJECTION, POWDER, LYOPHILIZED, FOR SOLUTION INTRAVENOUS at 00:57

## 2018-09-19 RX ADMIN — MORPHINE SULFATE 2 MG: 2 INJECTION, SOLUTION INTRAMUSCULAR; INTRAVENOUS at 21:15

## 2018-09-19 RX ADMIN — GABAPENTIN 100 MG: 100 CAPSULE ORAL at 18:05

## 2018-09-19 RX ADMIN — QUETIAPINE FUMARATE 12.5 MG: 25 TABLET, FILM COATED ORAL at 15:30

## 2018-09-19 RX ADMIN — Medication 1 AMPULE: at 09:21

## 2018-09-19 RX ADMIN — MORPHINE SULFATE 2 MG: 2 INJECTION, SOLUTION INTRAMUSCULAR; INTRAVENOUS at 12:26

## 2018-09-19 RX ADMIN — VANCOMYCIN HYDROCHLORIDE 500 MG: 1 INJECTION, POWDER, LYOPHILIZED, FOR SOLUTION INTRAVENOUS at 12:44

## 2018-09-19 RX ADMIN — CARBIDOPA AND LEVODOPA 2 TABLET: 25; 100 TABLET, EXTENDED RELEASE ORAL at 21:15

## 2018-09-19 RX ADMIN — Medication 5 ML: at 15:30

## 2018-09-19 RX ADMIN — MORPHINE SULFATE 2 MG: 2 INJECTION, SOLUTION INTRAMUSCULAR; INTRAVENOUS at 01:25

## 2018-09-19 RX ADMIN — WARFARIN SODIUM 5 MG: 5 TABLET ORAL at 18:05

## 2018-09-19 RX ADMIN — DEXTROSE MONOHYDRATE AND SODIUM CHLORIDE 75 ML/HR: 5; .45 INJECTION, SOLUTION INTRAVENOUS at 23:46

## 2018-09-19 RX ADMIN — Medication 1 AMPULE: at 21:15

## 2018-09-19 RX ADMIN — DEXTROSE MONOHYDRATE AND SODIUM CHLORIDE 75 ML/HR: 5; .45 INJECTION, SOLUTION INTRAVENOUS at 00:03

## 2018-09-19 RX ADMIN — METOPROLOL SUCCINATE 25 MG: 25 TABLET, EXTENDED RELEASE ORAL at 09:22

## 2018-09-19 RX ADMIN — CARBIDOPA AND LEVODOPA 1 TABLET: 25; 250 TABLET ORAL at 09:21

## 2018-09-19 RX ADMIN — Medication 5 ML: at 21:15

## 2018-09-19 RX ADMIN — VANCOMYCIN HYDROCHLORIDE 500 MG: 1 INJECTION, POWDER, LYOPHILIZED, FOR SOLUTION INTRAVENOUS at 21:01

## 2018-09-19 RX ADMIN — LORAZEPAM 1 MG: 2 INJECTION INTRAMUSCULAR; INTRAVENOUS at 12:49

## 2018-09-19 RX ADMIN — FAMOTIDINE 20 MG: 10 INJECTION, SOLUTION INTRAVENOUS at 09:21

## 2018-09-19 RX ADMIN — SODIUM CHLORIDE 1 G: 900 INJECTION, SOLUTION INTRAVENOUS at 09:21

## 2018-09-19 RX ADMIN — GABAPENTIN 100 MG: 100 CAPSULE ORAL at 09:21

## 2018-09-19 RX ADMIN — TUBERCULIN PURIFIED PROTEIN DERIVATIVE 5 UNITS: 5 INJECTION, SOLUTION INTRADERMAL at 09:23

## 2018-09-19 RX ADMIN — Medication 5 ML: at 05:24

## 2018-09-19 RX ADMIN — CARBIDOPA AND LEVODOPA 1 TABLET: 25; 250 TABLET ORAL at 05:04

## 2018-09-19 RX ADMIN — GABAPENTIN 100 MG: 100 CAPSULE ORAL at 21:15

## 2018-09-19 RX ADMIN — CARBIDOPA AND LEVODOPA 1 TABLET: 25; 250 TABLET ORAL at 14:00

## 2018-09-19 RX ADMIN — CARBIDOPA AND LEVODOPA 1 TABLET: 25; 250 TABLET ORAL at 18:05

## 2018-09-19 RX ADMIN — DAKIN'S SOLUTION 0.125% (QUARTER STRENGTH): 0.12 SOLUTION at 09:25

## 2018-09-19 NOTE — PROGRESS NOTES
Wet to dry dressing change completed using 4x4, ABD and dakins solution. Morphine given prior and pt tolerated well.

## 2018-09-19 NOTE — PROGRESS NOTES
Warfarin dosing per pharmacist 
 
Geovanna Nagy is a 75 y.o. female. Height: 5' (152.4 cm)    Weight: 41.4 kg (91 lb 4.8 oz) Indication:  Afib Goal INR:  2-3 Home dose:  5 mg daily Risk factors or significant drug interactions:  n/a Other anticoagulants:  none Daily Monitoring Date  INR     Warfarin dose HGB              Notes 9/16  4.7  Hold  10.0 
9/17  4.9  Hold  8.6  Vit K 10 mg PO x 1 
9/18  1.8  Hold  10.4 9/19  1.3  5 mg  10.2 Pharmacy consulted to dose. INR 1.3. OK to resume warfarin per surgery. Will give 5 mg this evening and follow daily INR for changes. Thank you, Ronni Tomas, Pharm. D. Clinical Pharmacist 
[phone]

## 2018-09-19 NOTE — PROGRESS NOTES
END OF SHIFT NOTE: 
 
Intake/Output 
09/18 1901 - 09/19 0700 In: 6181 [P.O.:100; I.V.:1106] Out: 1850 [[id number]] Voiding: YES Catheter: NO 
Drain:  
External Female Catheter 09/18/18 (Active) Site Assessment Clean, dry, & intact 9/19/2018 12:33 AM  
Repositioned No 9/19/2018 12:33 AM  
Perineal Care Yes 9/19/2018 12:33 AM  
Wick Changed No 9/19/2018 12:33 AM  
Suction Canister/Tubing Changed No 9/19/2018 12:33 AM  
Urine Output (mL) 1050 ml 9/19/2018  3:00 AM  
 
 
 
 
 
 
Stool:  0 occurrences. Emesis:  0 occurrences. VITAL SIGNS Patient Vitals for the past 12 hrs: 
 Temp Pulse Resp BP SpO2  
09/19/18 0300 98.5 °F (36.9 °C) 72 18 131/67 98 % 09/18/18 2314 98.6 °F (37 °C) 77 18 147/86 98 % 09/18/18 1915 98 °F (36.7 °C) 79 16 138/68 98 % Pain Assessment Pain 1 Pain Scale 1: Numeric (0 - 10) (09/18/18 2050) Pain Intensity 1: 0 (09/18/18 2050) Patient Stated Pain Goal: 0 (09/17/18 1500) Pain Reassessment 1: Patient sleeping (09/17/18 1245) Ambulating No 
 
Additional Information: Morphine given 1x before wound drsg change. See previous note. Took meds crushed with applesauce INR <1.4 No needs voiced at this time Shift report given to oncoming nurse at the bedside. Carmela Slice

## 2018-09-19 NOTE — PROGRESS NOTES
H&P/Consult Note/Progress Note/Office Note:  
Jania Huerta  MRN: [id number]  :1950  Age:67 y.o. 
 
HPI: Jania Huerta is a 75 y.o. female who has PMHx of Parkinson disease, a fib on Coumadin s/p PPM, HTn, HLD, tremors, meningioma who presented to ED on the night of 18 with increased confusion, agitation, and possible fever per a friend and home health. She lives at home with  who is wheelchair bound and has hospice and home health. On admission she was found to have INR 4.9 and sacral ulcer with purulent drainage. Tmax 99.4, tachycardic, on room air. She was started on IV Cefepime and Vanco. Based on ED notes, there is some concern that pt is not receiving adequate care in receiving her meds and diet. No family or friends have been present at bedside for out initial consultation. Cultures with gm + cocci and yeast so far. 18 CT pelvis There is a decubitus ulcer overlying the coccyx. The ulcer does extend to the bone. There is no definite erosion of the adjacent bone. There is no CT evidence of osteomyelitis. There are no fractures. There is degenerative change in the lower lumbar spine. 
  
There is mild prominence of the stool pattern. There are no focal inflammatory 
changes or fluid collections. There is mild diffuse atherosclerosis. 
  
IMPRESSION: 
1. Sacral decubitus ulcer. No evidence of osteomyelitis. 2.  Constipation. 18 No Interval changes. confused 18 more lucid; no new complaints Past Medical History:  
Diagnosis Date  Arrhythmia 2016  Atrial fibrillation ([address] 2015  Dry eye  Heart disease 2016  High blood pressure 2016  History of basal cell cancer 2016  Hyperlipidemia 2016  Hypertension 2016  Presence of cardiac pacemaker 2015  Tremor 2016 Past Surgical History:  
Procedure Laterality Date  HX COLONOSCOPY  2011  HX HEART CATHETERIZATION    
 HX OTHER SURGICAL Cardiac Electrophysiology Mapping and Ablation  HX OTHER SURGICAL Lung Biopsy  HX OTHER SURGICAL  1982  
 skin cancer excision, basal cell carcinoma on forehead  HX OTHER SURGICAL  6/12 Cardiac pacemaker placement  HX PARTIAL HYSTERECTOMY Abdominal   
 HX SALPINGO-OOPHORECTOMY Laparoscopic, with MARIA ELENA  
 HX TONSILLECTOMY Current Facility-Administered Medications Medication Dose Route Frequency  carbidopa-levodopa (SINEMET)  mg per tablet 1 Tab  1 Tab Oral QID  carbidopa-levodopa ER (SINEMET CR)  mg per tablet 2 Tab  2 Tab Oral QHS  gabapentin (NEURONTIN) capsule 100 mg  100 mg Oral TID  metoprolol succinate (TOPROL-XL) XL tablet 25 mg  25 mg Oral DAILY  famotidine (PF) (PEPCID) 20 mg in sodium chloride 0.9% 10 mL injection  20 mg IntraVENous DAILY  LORazepam (ATIVAN) injection 1 mg  1 mg IntraVENous Q4H PRN  
 sodium chloride (NS) flush 5 mL  5 mL InterCATHeter Q8H  
 sodium chloride (NS) flush 5-10 mL  5-10 mL InterCATHeter PRN  
 ondansetron (ZOFRAN) injection 4 mg  4 mg IntraVENous Q6H PRN  
 acetaminophen (TYLENOL) tablet 650 mg  650 mg Oral Q6H PRN  
 morphine injection 2 mg  2 mg IntraVENous Q4H PRN  
 oxyCODONE IR (ROXICODONE) tablet 5 mg  5 mg Oral Q6H PRN  
 warfarin (COUMADIN) tablet 5 mg - On Hold  5 mg Oral See Admin Instructions  alcohol 62% (NOZIN) nasal  1 Ampule  1 Ampule Topical Q12H  cefepime (MAXIPIME) 1 g in 0.9% sodium chloride (MBP/ADV) 50 mL ADV  1 g IntraVENous Q12H  
 influenza vaccine 2018-19 (6 mos+)(PF) (FLUARIX QUAD/FLULAVAL QUAD) injection 0.5 mL  0.5 mL IntraMUSCular PRIOR TO DISCHARGE  sodium hypochlorite (QUARTER STRENGTH DAKIN'S) 0.125% irrigation (bottle)   Topical BID  dextrose 5 % - 0.45% NaCl infusion  75 mL/hr IntraVENous CONTINUOUS  
 vancomycin (VANCOCIN) 750 mg in  ml infusion  750 mg IntraVENous Q18H Codeine; Penicillins; and Sulfa (sulfonamide antibiotics) Social History Social History  Marital status:  Spouse name: N/A  
 Number of children: N/A  
 Years of education: N/A Social History Main Topics  Smoking status: Never Smoker  Smokeless tobacco: Never Used  Alcohol use No  
 Drug use: No  
 Sexual activity: Not Asked Other Topics Concern  None Social History Narrative History Smoking Status  Never Smoker Smokeless Tobacco  
 Never Used Family History Problem Relation Age of Onset  Heart Disease Mother  Thyroid Disease Mother  Heart Disease Father  Cancer Father Lung Cancer ROS: Limited by patients mental status. Subjective fever. Comprehensive review of systems was otherwise unremarkable except as noted above. Physical Exam:  
Visit Vitals  /67  Pulse 72  Temp 98.5 °F (36.9 °C)  Resp 18  Ht 5' (1.524 m)  Wt 91 lb 4.8 oz (41.4 kg)  LMP  (LMP Unknown)  SpO2 98%  Breastfeeding No  
 BMI 17.83 kg/m2 Constitutional: Alert, oriented to self only, cooperative patient in no acute distress; appears stated age Eyes:Sclera are clear. EOMs intact ENMT: no external lesions gross hearing normal; no obvious neck masses, no ear or lip lesions, nares normal 
CV: regular rate Resp: No JVD. Breathing is  non-labored; no audible wheezing. CTAB. GI: soft and non-distended, non tender. + BS. Musculoskeletal: severe bilateral UE tremor Skin: stage 3-4 sacral ulcer with some undermining inferiorly. Wound bed is clear following Dakins; no surrounding cellulitis; minimal debris Bone palpable but not visible, covered with very thin layer of tissue. Neuro: Moves all 4, severe tremors Psychiatric: + memory impairment Recent vitals (if inpt): 
Patient Vitals for the past 24 hrs: 
 BP Temp Pulse Resp SpO2  
09/19/18 0300 131/67 98.5 °F (36.9 °C) 72 18 98 % 09/18/18 2314 147/86 98.6 °F (37 °C) 77 18 98 % 09/18/18 1915 138/68 98 °F (36.7 °C) 79 16 98 % 09/18/18 1615 132/72 98.6 °F (37 °C) 70 16 99 % 09/18/18 1515 123/66 98.4 °F (36.9 °C) 79 19 100 % 09/18/18 1119 152/44 98.2 °F (36.8 °C) 75 19 100 % 09/18/18 0749 125/66 98.4 °F (36.9 °C) 73 20 95 % Labs: 
Recent Labs  
   09/19/18 
 0430  09/18/18 
 0414  09/17/18 
 0431 WBC  5.5  7.3  7.3 HGB  10.2*  10.4*  8.6*  
PLT  323  361  285 NA   --   142  141 K   --   4.2  4.0  
CL   --   105  106 CO2   --   31  27 BUN   --   11  24* CREA   --   0.37*  0.31* GLU   --   95  82 PTP  15.4*  20.2*  43.5* INR  1.3  1.8  4.9* TBILI   --    --   0.3 SGOT   --    --   29 ALT   --    --   7* AP   --    --   80 Lab Results Component Value Date/Time WBC 5.5 09/19/2018 04:30 AM  
 HGB 10.2 (L) 09/19/2018 04:30 AM  
 PLATELET 388 37/13/8004 04:30 AM  
 Sodium 142 09/18/2018 04:14 AM  
 Potassium 4.2 09/18/2018 04:14 AM  
 Chloride 105 09/18/2018 04:14 AM  
 CO2 31 09/18/2018 04:14 AM  
 BUN 11 09/18/2018 04:14 AM  
 Creatinine 0.37 (L) 09/18/2018 04:14 AM  
 Glucose 95 09/18/2018 04:14 AM  
 INR 1.3 09/19/2018 04:30 AM  
 aPTT 49.3 (H) 02/06/2018 01:04 AM  
 Bilirubin, total 0.3 09/17/2018 04:31 AM  
 AST (SGOT) 29 09/17/2018 04:31 AM  
 ALT (SGPT) 7 (L) 09/17/2018 04:31 AM  
 Alk. phosphatase 80 09/17/2018 04:31 AM  
 Ammonia <10 (L) 02/06/2018 01:04 AM  
 Troponin-I, Qt. <0.02 (L) 04/29/2018 05:34 AM  
 
 
I reviewed recent labs and recent radiologic studies. CT Results (most recent): 
 
Results from Hospital Encounter encounter on 09/16/18 CT PELV W CONT Narrative CT of the pelvis INDICATION:  Sacral decubitus ulcer Multiple axial images were obtained through the pelvis after intravenous 
injection of 100mL of Isovue 370. Radiation dose reduction techniques were used 
for this study:   All CT scans performed at this facility use one or all of the 
 following: Automated exposure control, adjustment of the mA and/or kVp according 
to patient's size, iterative reconstruction. FINDINGS: There is a decubitus ulcer overlying the coccyx. The ulcer does 
extend to the bone. There is no definite erosion of the adjacent bone. There 
is no CT evidence of osteomyelitis. There are no fractures. There is 
degenerative change in the lower lumbar spine. There is mild prominence of the stool pattern. There are no focal inflammatory 
changes or fluid collections. There is mild diffuse atherosclerosis. Impression IMPRESSION: 
1. Sacral decubitus ulcer. No evidence of osteomyelitis. 2.  Constipation. XR Results (most recent): 
 
Results from Hospital Encounter encounter on 09/16/18 XR CHEST PORT Narrative Portable chest xray COMPARISON: April 29, 2018. CLINICAL HISTORY: Chest pain. FINDINGS: 
 
There is a stable left-sided cardiac pacer. Cardiac mediastinal contour is 
within normal limits. No pulmonary consolidation, pleural effusion or 
pneumothorax thorax. No pulmonary edema. Bones are osteopenic. Small calcified 
granuloma is present in the left lung. Impression IMPRESSION: 
 
No acute pulmonary disease. I independently reviewed radiology images for studies I described above or studies I have ordered. Admission date (for inpatients): 9/16/2018 * No surgery found *  * No surgery found * ASSESSMENT/PLAN: 
Problem List  Date Reviewed: 11/6/2017 Codes Class Noted Severe protein-calorie malnutrition ([facility] ICD-10-CM: Q78 ICD-9-CM: 722  9/18/2018 Debility ICD-10-CM: R53.81 ICD-9-CM: 799.3  9/17/2018 Metabolic encephalopathy PRASHANT-25-PU: G93.41 
ICD-9-CM: 348.31  9/16/2018 * (Principal)Sepsis ([facility] ICD-10-CM: A41.9 ICD-9-CM: 038.9, 995.91  9/16/2018 Pressure ulcer of sacral region, stage 4 (HCC) ICD-10-CM: O41.392 ICD-9-CM: 707.03, 707.24  9/16/2018 Tremors of nervous system ICD-10-CM: R25.1 ICD-9-CM: 781.0  2/6/2018 Meningioma ([facility] ICD-10-CM: D32.9 ICD-9-CM: 225.2  2/6/2018 Parkinson disease [facility]) ICD-10-CM: G20 
ICD-9-CM: 332.0  8/15/2016 RBD (REM behavioral disorder) ICD-10-CM: G47.52 
ICD-9-CM: 327.42  8/15/2016 Hypertension ICD-10-CM: I10 
ICD-9-CM: 401.9  7/14/2016 Hyperlipidemia ICD-10-CM: E78.5 ICD-9-CM: 272.4  7/14/2016 History of basal cell cancer ICD-10-CM: G40.271 ICD-9-CM: V10.83  7/14/2016 Atrial fibrillation ([facility] ICD-10-CM: I48.91 
ICD-9-CM: 427.31  8/24/2015 Presence of cardiac pacemaker ICD-10-CM: Z95.0 ICD-9-CM: V45.01  8/24/2015 Principal Problem: 
  Sepsis ([facility] (9/16/2018) Active Problems: 
  Parkinson disease ([facility] (8/15/2016) Metabolic encephalopathy (5/66/1492) Pressure ulcer of sacral region, stage 4 ([facility] (9/16/2018) Debility (9/17/2018) Severe protein-calorie malnutrition ([facility] (9/18/2018) Plan: 
Debilitated with stage 4 sacral pressure ulcer. Sepsis appears resolved Continue IV Vanc/Cefepime while inpt;  Consider diflucan given wound cultures. PO Abx as outpt as per hospitalists CT pelvis showed no evidence of osteomeylitis The wound is showing signs of granulation and debris is mostly cleared with just a few days of Dakins She will not need debridement this hospitalization. I will follow her as outpt I recommend SNF secondary to profound debilitation and stage 4 sacral pressure ulcer wound needs She needs air mattress at SNF and much aggressive wound OK to resume coumadin; INR 4.9-->1.8 I wrote for wound care and follow-up in dc instructions section of connect care Signed:  Shena Sanchez MD

## 2018-09-19 NOTE — PROGRESS NOTES
Problem: Falls - Risk of 
Goal: *Absence of Falls Document Carmen Deal Fall Risk and appropriate interventions in the flowsheet. Fall Risk Interventions: 
Mobility Interventions: Strengthening exercises (ROM-active/passive) Mentation Interventions: Adequate sleep, hydration, pain control Medication Interventions: Evaluate medications/consider consulting pharmacy Elimination Interventions: Bed/chair exit alarm, Call light in reach History of Falls Interventions: Evaluate medications/consider consulting pharmacy

## 2018-09-19 NOTE — PROGRESS NOTES
Shift: 9/18/19 (4594-5217) AM Handoff Nurse: Jonathan Hernandez RN Pain: Intermittent c/o pain during the shift. Morphine administered. Neuro: A&Ox2. Intermittent confusion. No c/o numbness or tingling. Cardio: S1/S2. SR paced. CV VS WNL for specified parameters. Resp: RA. Clear bilaterally. Symmetric chest wall excursion. Resp VS WNL. GI/: Voiding. Urine is yellow and clear. Last BM: unknown. No c/o N/V/D/C. Diet: Tolerating diet. Adequate intake noted. See I&O for further details. Ambulation: Pt ambulates with assistance x2. No falls during the shift. Linen Change: 9/18/19 Additional Information: Pressure ulcer re-dressed per surgery instructions. PT consulted in an effort to decrease patient's restlessness. Ambulation greatly helped patient calm down and rest comfortably. PPD placed. OT consulted. Possible discharge to [facility] on Friday. Continue with POC. Patient Vitals for the past 12 hrs: 
 Temp Pulse Resp BP SpO2  
09/19/18 1500 97.7 °F (36.5 °C) 96 18 180/87 99 % 09/19/18 1126 98.8 °F (37.1 °C) 86 17 154/65 97 % 09/19/18 0725 98 °F (36.7 °C) 68 17 118/64 97 % EOS Handoff Nurse: Jonathan Hernandez RN
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
## 2018-09-19 NOTE — PROGRESS NOTES
DIANNE followed up with  note. Debriedment is not necessary. SNF has been recommended. DIANNE spoke with spouse about choice. He stated he would like [facility] choice and [id number] as his second choice. DIANNE will send referrals to facilities. Also, PPD has been ordered. Possible discharge on Friday.

## 2018-09-19 NOTE — PROGRESS NOTES
Problem: Mobility Impaired (Adult and Pediatric) Goal: *Acute Goals and Plan of Care (Insert Text) 1. Ms. Devon Devries will perform sit to stand and bed to chair with supervision in 7 days. 2.  Ms. Devon Devries will perform gait with contact guard of 1, 100 ft in 7 days. PHYSICAL THERAPY: Initial Assessment 9/19/2018 INPATIENT: Hospital Day: 4 [place] / Product Type: Medicare /  
  
NAME/AGE/GENDER: Komal Cleveland is a 75 y.o. female PRIMARY DIAGNOSIS: Sepsis ([address] Sacral ulcer ([address] Parkinson disease ([address] Metabolic encephalopathy Sepsis ([address] Sepsis ([facility] [address] [facility] ICD-10: Treatment Diagnosis:  
 · Generalized Muscle Weakness (M62.81) · Difficulty in walking, Not elsewhere classified (R26.2) · Ataxic gait (R26.0) Precaution/Allergies: 
Codeine; Penicillins; and Sulfa (sulfonamide antibiotics) ASSESSMENT:  
 
Ms. Devon Devries presents in bed with an uncontrollable tremor. She is pleasant and confused. She has been restless today trying to get out of bed. PT was consulted to assist as her parkinsons is fairly advanced. She would not work with me until I called her  so I did and he had a chance to talk to her and he also let me know that she ambulatory up until the day she came to the hospital this past Sunday. Ms. Devon Devries is very thin. She was able to come to the edge of the bed with min assist.  Sit to stand with min assist and gait a very typical parkinsons gait with contact to min assist of 2. Her tremor is quite something. Following gait she was returned to bed. It was Dr. Meaghan Crouch hope that the ambulation would settle her down so she could rest.  She was positioned with a pillow of a large sacral wound that does not seem to cause her any pain currently. Ms. Devon Devries is appropriate for skilled PT to maximize her rehab potential.   
 
This section established at most recent assessment PROBLEM LIST (Impairments causing functional limitations): 
 1. Decreased Ambulation Ability/Technique 2. Decreased Activity Tolerance INTERVENTIONS PLANNED: (Benefits and precautions of physical therapy have been discussed with the patient.) 1. Gait Training 2. Therapeutic Activites 3. Transfer Training TREATMENT PLAN: Frequency/Duration: 3 times a week for duration of hospital stay Rehabilitation Potential For Stated Goals: Fair RECOMMENDED REHABILITATION/EQUIPMENT: (at time of discharge pending progress): Due to the probability of continued deficits (see above) this patient will likely need continued skilled physical therapy after discharge. Equipment:  
? None at this time HISTORY:  
History of Present Injury/Illness (Reason for Referral): This is a 69-year-old female patient who has a very well known history of Parkinson disease, depression and atrial fibrillation on anticoagulation with Coumadin. She was brought into the emergency room this evening because according to her friends, since yesterday she has been more confused. Her Parkinson's disease has gotten worse with extremely severe tremors and she seemed to be dehydrated. The amount of information that the patient can provide is limited. This is the same case with her friends and neighbors. She lives at home with her wheelchair bound , and apparently he also has a stone, which is not involved in her care. Her friends have severe concerns about proper medication administration, diet and general care. She has home health services and apparently she also has home hospice services, but even with that apparently her care has been suboptimal.  When she arrived into the emergency room, she was severely dehydrated. Her blood pressure was 134/62. Her heart rate was 145. Her O2 saturation was 95% and her respiratory rate was 20. She was having severe and violent tremors in her upper extremities every time she tried to do any movement.   Her lungs were clear to auscultation. Her abdomen was soft and nontender, and she was found to have a deep sacral ulcer with purulent secretion. Her initial blood workup reported a white blood cell count of 14,000, stable hemoglobin, normal platelets. INR of 4.7. Normal electrolytes, normal creatinine level, normal liver function panel. A chest x-ray was done and showed no evidence of acute pulmonary disease. Urinary dipstick was done and it was not suggestive of UTI. The patient received IV ceftriaxone in the emergency room and she was presented to the hospitalist team to be admitted. Past Medical History/Comorbidities: Ms. Deb Saldaña  has a past medical history of Arrhythmia (7/14/2016); Atrial fibrillation ([facility] [address] [facility] (8/24/2015); Dry eye; Heart disease (7/14/2016); High blood pressure (7/14/2016); History of basal cell cancer (7/14/2016); Hyperlipidemia (7/14/2016); Hypertension (7/14/2016); Presence of cardiac pacemaker (8/24/2015); and Tremor (7/14/2016). Ms. Deb Saldaña  has a past surgical history that includes hx other surgical; hx other surgical; hx salpingo-oophorectomy; hx colonoscopy (12/2011); hx other surgical (1982); hx tonsillectomy; hx heart catheterization; hx other surgical (6/12); and hx partial hysterectomy. Social History/Living Environment:  
Home Environment: Private residence # Steps to Enter: 5 One/Two Story Residence: One story Living Alone: No 
Support Systems: Spouse/Significant Other/Partner (however spouse is on oxygen and in a w/c) Patient Expects to be Discharged to[de-identified] Skilled nursing facility Current DME Used/Available at Home: Walker, Oxygen, portable Prior Level of Function/Work/Activity: 
Shuffle gait  
age, parkinsons Number of Personal Factors/Comorbidities that affect the Plan of Care: 1-2: MODERATE COMPLEXITY EXAMINATION:  
Most Recent Physical Functioning:  
Gross Assessment: 
AROM: Generally decreased, functional 
PROM: Generally decreased, functional 
 Strength: Generally decreased, functional 
Coordination: Generally decreased, functional 
Tone: Abnormal 
         
  
Posture: 
  
Balance: 
Sitting: Impaired Sitting - Static: Supported sitting; Fair (occasional) Sitting - Dynamic: Poor (constant support) Standing: Impaired; With support Standing - Static: Fair Standing - Dynamic : Fair Bed Mobility: 
Rolling: Supervision Supine to Sit: Minimum assistance Sit to Supine: Moderate assistance Wheelchair Mobility: 
  
Transfers: 
Sit to Stand: Contact guard assistance;Minimum assistance;Assist x1 Stand to Sit: Contact guard assistance;Minimum assistance;Assist x1 Gait: 
  
Base of Support: Narrowed Speed/Jasmin: Slow;Shuffled Step Length: Right shortened;Left shortened Distance (ft): 40 Feet (ft) Ambulation - Level of Assistance: Assist x2;Minimal assistance Interventions: Verbal cues; Tactile cues; Safety awareness training;Manual cues Body Structures Involved: 1. Muscles Body Functions Affected: 1. Movement Related Activities and Participation Affected: 1. Mobility Number of elements that affect the Plan of Care: 3: MODERATE COMPLEXITY CLINICAL PRESENTATION:  
Presentation: Evolving clinical presentation with changing clinical characteristics: MODERATE COMPLEXITY CLINICAL DECISION MAKING:  
[facility] [place] [facility] -[facility] 6 Clicks Basic Mobility Inpatient Short Form How much difficulty does the patient currently have. .. Unable A Lot A Little None 1. Turning over in bed (including adjusting bedclothes, sheets and blankets)? [] 1   [] 2   [x] 3   [] 4  
2. Sitting down on and standing up from a chair with arms ( e.g., wheelchair, bedside commode, etc.)   [] 1   [] 2   [x] 3   [] 4  
3. Moving from lying on back to sitting on the side of the bed? [] 1   [] 2   [x] 3   [] 4 How much help from another person does the patient currently need. .. Total A Lot A Little None 4. Moving to and from a bed to a chair (including a wheelchair)? [] 1   [] 2   [x] 3   [] 4  
5. Need to walk in hospital room? [] 1   [x] 2   [] 3   [] 4  
6. Climbing 3-5 steps with a railing? [x] 1   [] 2   [] 3   [] 4  
© 2007, Trustees of [facility], under license to [employer]. All rights reserved Score:  Initial: 15 Most Recent: X (Date: -- ) Interpretation of Tool:  Represents activities that are increasingly more difficult (i.e. Bed mobility, Transfers, Gait). Score 24 23 22-20 19-15 14-10 9-7 6 Modifier CH CI CJ CK CL CM CN   
 
? Mobility - Walking and Moving Around:  
  - CURRENT STATUS: CK - 40%-59% impaired, limited or restricted  - GOAL STATUS: CK - 40%-59% impaired, limited or restricted  - D/C STATUS:  ---------------To be determined--------------- [place] / [place] B / Product Type: Medicare /   
 
Medical Necessity:    
· Patient is expected to demonstrate progress in functional technique to decrease assistance required with mobility and gait. . 
Reason for Services/Other Comments: 
· Patient continues to require present interventions due to patient's inability to function at baseline. .  
Use of outcome tool(s) and clinical judgement create a POC that gives a: Questionable prediction of patient's progress: MODERATE COMPLEXITY  
  
 
 
 
TREATMENT:  
(In addition to Assessment/Re-Assessment sessions the following treatments were rendered) Pre-treatment Symptoms/Complaints:  restless Pain: Initial:  
Pain Intensity 1: 0  Post Session:  She was resting peacefully when PT left the room. Assessment/Reassessment only, no treatment provided today Braces/Orthotics/Lines/Etc:  
· O2 Device: Room air Treatment/Session Assessment:   
· Response to Treatment:  good · Interdisciplinary Collaboration:  
o Registered Nurse · After treatment position/precautions:  
o Supine in bed 
o Call light within reach o RN notified · Compliance with Program/Exercises: Will assess as treatment progresses. · Recommendations/Intent for next treatment session: \"Next visit will focus on advancements to more challenging activities and reduction in assistance provided\". Total Treatment Duration: PT Patient Time In/Time Out Time In: 1938 Time Out: 1545 Christophe Conley, GALINA

## 2018-09-19 NOTE — PROGRESS NOTES
Pharmacokinetic Consult to Pharmacist 
 
Merlin Number is a 75 y.o. female being treated for sepsis due to infected sacral ulcer with vancomycin and cefepime. Height: 5' (152.4 cm)  Weight: 41.4 kg (91 lb 4.8 oz) Lab Results Component Value Date/Time BUN 11 09/19/2018 04:30 AM  
 Creatinine 0.49 (L) 09/19/2018 04:30 AM  
 WBC 5.5 09/19/2018 04:30 AM  
 Lactic acid 0.5 09/17/2018 04:31 AM  
 Lactic Acid (POC) 2.1 (H) 09/16/2018 08:18 PM  
  
Estimated Creatinine Clearance: 71.8 mL/min (based on Cr of 0.49). Lab Results Component Value Date/Time Vancomycin,trough 5.7 09/18/2018 11:38 PM  
 
 
Day 4 of vancomycin. Goal trough is 15-20. Change to 500 mg q8h, next dose at 1200. Will continue to follow patient. Thank you, Isabel Castillo, Pharm. D. Clinical Pharmacist 
[phone]

## 2018-09-19 NOTE — DISCHARGE INSTRUCTIONS
WOUND CARE INSTRUCTIONS FOR STAGE 4 SACRAL PRESSURE ULCER  Level 2 Air mattress    DAILY and PRN Wet-->dry gauze sacral wound packing (moistened with 0.125% Dakins solution-- dampen gauze with Dakins and squeeze out excess). Cover with ABD pad and tape. Change dressing qday and PRN if soiled.     Follow-up with Dr Leroy Romo in 2 weeks (or sooner if needed) in the office at:  [address], [address]  (Call for an appt time-->[phone]-->option 1)

## 2018-09-19 NOTE — PROGRESS NOTES
SW received notice from [facility], they have accepted patient. SW spoke with spouse and he is fine with that. PPD has been ordered and SW awaiting follow up. Possible discharge on Friday.

## 2018-09-19 NOTE — PROGRESS NOTES
Neurology Daily Progress Note Assessment:  
 
Parkinson's disease Plan:  
 
Continue current doses of Sinemet (Sinemet  QID and Sinemet ER  2 tablets qhs). No changes necessary in inpatient setting.  
  
Patient is seen by Dr. Indio Jimenes and should follow up with her in clinic after discharge Subjective: Interval history: 
 
The patient is overall about the same today. Tremor appears unchanged. She is pleasantly confused. Oriented to person only. No family or friends at bedside today. History: 
 
Wanda Elizabeth is a 75 y.o. female who is being seen for tremor. There is no family at bedside and the patient is unable to provide history. HPI is obtain from RN and previous notes. The patient was brought to the ED on 09/16 by friends after it was felt that her mental status was acutely worse. There was also concern for fever. It was noted that her tremor has been worse recently. She has an underlying history of dementia and PD. The patient was found to have a large sacral decubitus ulcer with concern for sepsis and concerns for malnutrition, dehydration. The friends who brought the patient to the ED expressed concern that she may not be getting the care she needs, specifically may not be getting/taking medications appropriately. She has a history of Afib and INR was supratherapeutic on admission.  
  
On initial exam, the patient is noted to have intention tremor in bilateral upper extremities. She is oriented only to person. Per RN, the patient is prescribed Sinemet, however there was concern she had not been taking appropriate dose, as the patient had prescription bottles with several different doses. RN plans to reconcile medications with patient's  today. She is a current patient of Dr. Indio Jimenes, last seen in April. There was concern previously by Dr. Indio Jimenes that the patient may not be taking medications appropriately. ROS unable to be obtained Objective:  
 
Vitals:  
 09/18/18 1915 09/18/18 2314 09/19/18 0300 09/19/18 0725 BP: 138/68 147/86 131/67 118/64 Pulse: 79 77 72 68 Resp: 16 18 18 17 Temp: 98 °F (36.7 °C) 98.6 °F (37 °C) 98.5 °F (36.9 °C) 98 °F (36.7 °C) SpO2: 98% 98% 98% 97% Weight:      
Height:      
  
 
 
Current Facility-Administered Medications:  
  tuberculin injection 5 Units, 5 Units, IntraDERMal, ONCE, Loni Sterling. Tomas Martin MD, 5 Units at 09/19/18 0923 
  vancomycin (VANCOCIN) 500 mg in 0.9% sodium chloride 100 mL IVPB, 500 mg, IntraVENous, Q8H, Cole Martínez MD 
  warfarin (COUMADIN) tablet 5 mg, 5 mg, Oral, QPM, Yg Plata MD 
  carbidopa-levodopa (SINEMET)  mg per tablet 1 Tab, 1 Tab, Oral, QID, Júnior Akhtar MD, 1 Tab at 09/19/18 2901   carbidopa-levodopa ER (SINEMET CR)  mg per tablet 2 Tab, 2 Tab, Oral, QHS, Yg Plata MD, 2 Tab at 09/18/18 2142 
  gabapentin (NEURONTIN) capsule 100 mg, 100 mg, Oral, TID, Júnior Akhtar MD, 100 mg at 09/19/18 8428 
  metoprolol succinate (TOPROL-XL) XL tablet 25 mg, 25 mg, Oral, DAILY, Júnior Akhtar MD, 25 mg at 09/19/18 9620   famotidine (PF) (PEPCID) 20 mg in sodium chloride 0.9% 10 mL injection, 20 mg, IntraVENous, DAILY, Yg Plata MD, 20 mg at 09/19/18 5377   LORazepam (ATIVAN) injection 1 mg, 1 mg, IntraVENous, Q4H PRN, Júnior Akhtar MD, 1 mg at 09/18/18 1306   sodium chloride (NS) flush 5 mL, 5 mL, InterCATHeter, Q8H, Yg Plata MD, 5 mL at 09/19/18 1694   sodium chloride (NS) flush 5-10 mL, 5-10 mL, InterCATHeter, PRN, Júnior Akhtar MD 
  ondansetron [facility]) injection 4 mg, 4 mg, IntraVENous, Q6H PRN, Júnior Akhtar MD 
  acetaminophen (TYLENOL) tablet 650 mg, 650 mg, Oral, Q6H PRN, Júnior Akhtar MD 
  morphine injection 2 mg, 2 mg, IntraVENous, Q4H PRN, Júnior Akhtar MD, 2 mg at 09/18/18 0369   oxyCODONE IR (ROXICODONE) tablet 5 mg, 5 mg, Oral, Q6H PRN, Yg Lamas MD 
  alcohol 62% (NOZIN) nasal  1 Ampule, 1 Ampule, Topical, Q12H, Yg Lamas MD, 1 Ampule at 09/19/18 1570   cefepime (MAXIPIME) 1 g in 0.9% sodium chloride (MBP/ADV) 50 mL ADV, 1 g, IntraVENous, Q12H, Moises Anola Kussmaul, MD, 1 g at 09/19/18 6444 
  influenza vaccine 2018-19 (6 mos+)(PF) (FLUARIX QUAD/FLULAVAL QUAD) injection 0.5 mL, 0.5 mL, IntraMUSCular, PRIOR TO DISCHARGE, Yg Lamas MD 
  sodium hypochlorite (QUARTER STRENGTH DAKIN'S) 0.125% irrigation (bottle), , Topical, BID, NIHARIKA Rey 
  dextrose 5 % - 0.45% NaCl infusion, 75 mL/hr, IntraVENous, CONTINUOUS, Moises Anola Kussmaul, MD, Last Rate: 75 mL/hr at 09/19/18 0003, 75 mL/hr at 09/19/18 0003 Recent Results (from the past 12 hour(s)) Eloisa Chakraborty Collection Time: 09/18/18 11:38 PM  
Result Value Ref Range Vancomycin,trough 5.7 5 - 20 ug/mL PROTHROMBIN TIME + INR Collection Time: 09/19/18  4:30 AM  
Result Value Ref Range Prothrombin time 15.4 (H) 11.5 - 14.5 sec INR 1.3 METABOLIC PANEL, BASIC Collection Time: 09/19/18  4:30 AM  
Result Value Ref Range Sodium 141 136 - 145 mmol/L Potassium 4.0 3.5 - 5.1 mmol/L Chloride 104 98 - 107 mmol/L  
 CO2 32 21 - 32 mmol/L Anion gap 5 (L) 7 - 16 mmol/L Glucose 115 (H) 65 - 100 mg/dL BUN 11 8 - 23 MG/DL Creatinine 0.49 (L) 0.6 - 1.0 MG/DL  
 GFR est AA >60 >60 ml/min/1.73m2 GFR est non-AA >60 >60 ml/min/1.73m2 Calcium 8.1 (L) 8.3 - 10.4 MG/DL  
CBC WITH AUTOMATED DIFF Collection Time: 09/19/18  4:30 AM  
Result Value Ref Range WBC 5.5 4.3 - 11.1 K/uL  
 RBC 3.47 (L) 4.05 - 5.2 M/uL  
 HGB 10.2 (L) 11.7 - 15.4 g/dL HCT 32.8 (L) 35.8 - 46.3 % MCV 94.5 79.6 - 97.8 FL  
 MCH 29.4 26.1 - 32.9 PG  
 MCHC 31.1 (L) 31.4 - 35.0 g/dL  
 RDW 16.0 % PLATELET 618 128 - 235 K/uL  MPV 9.4 9.4 - 12.3 FL  
 ABSOLUTE NRBC 0.00 0.0 - 0.2 K/uL  
 DF AUTOMATED NEUTROPHILS 56 43 - 78 % LYMPHOCYTES 31 13 - 44 % MONOCYTES 13 (H) 4.0 - 12.0 % EOSINOPHILS 0 (L) 0.5 - 7.8 % BASOPHILS 1 0.0 - 2.0 % IMMATURE GRANULOCYTES 0 0.0 - 5.0 %  
 ABS. NEUTROPHILS 3.1 1.7 - 8.2 K/UL  
 ABS. LYMPHOCYTES 1.7 0.5 - 4.6 K/UL  
 ABS. MONOCYTES 0.7 0.1 - 1.3 K/UL  
 ABS. EOSINOPHILS 0.0 0.0 - 0.8 K/UL  
 ABS. BASOPHILS 0.0 0.0 - 0.2 K/UL  
 ABS. IMM. GRANS. 0.0 0.0 - 0.5 K/UL Physical Exam: 
General - Thin, chronically ill appearing female. HEENT - Normocephalic, atraumatic. Conjunctiva and oropharynx are clear. Neck - Supple without masses Extremities - Peripheral pulses intact. Multiple bruises noted  
  
Neurological examination - Oriented to person only. Pleasantly confused. Language and speech appear normal. On cranial nerve examination pupils are equal round and reactive to light. Extraocular motility is normal. Face is symmetric. Hearing is intact to finger rustle bilaterally. Motor examination - Normal tone. Decreased muscle bulk. Power is full throughout. Hyperreflexic diffusley. Resting tremor in BUE. Gait not examined. Signed By: Rolando Bruno NP September 19, 2018

## 2018-09-20 LAB
ANION GAP SERPL CALC-SCNC: 6 MMOL/L (ref 7–16)
BACTERIA SPEC CULT: ABNORMAL
BACTERIA SPEC CULT: ABNORMAL
BASOPHILS # BLD: 0 K/UL (ref 0–0.2)
BASOPHILS NFR BLD: 1 % (ref 0–2)
BUN SERPL-MCNC: 8 MG/DL (ref 8–23)
CALCIUM SERPL-MCNC: 8.1 MG/DL (ref 8.3–10.4)
CHLORIDE SERPL-SCNC: 107 MMOL/L (ref 98–107)
CO2 SERPL-SCNC: 30 MMOL/L (ref 21–32)
CREAT SERPL-MCNC: 0.38 MG/DL (ref 0.6–1)
DIFFERENTIAL METHOD BLD: ABNORMAL
EOSINOPHIL # BLD: 0 K/UL (ref 0–0.8)
EOSINOPHIL NFR BLD: 0 % (ref 0.5–7.8)
ERYTHROCYTE [DISTWIDTH] IN BLOOD BY AUTOMATED COUNT: 16.1 %
GLUCOSE SERPL-MCNC: 81 MG/DL (ref 65–100)
GRAM STN SPEC: ABNORMAL
GRAM STN SPEC: ABNORMAL
HCT VFR BLD AUTO: 35.3 % (ref 35.8–46.3)
HGB BLD-MCNC: 10.9 G/DL (ref 11.7–15.4)
IMM GRANULOCYTES # BLD: 0 K/UL (ref 0–0.5)
IMM GRANULOCYTES NFR BLD AUTO: 0 % (ref 0–5)
INR PPP: 1.2
LYMPHOCYTES # BLD: 2 K/UL (ref 0.5–4.6)
LYMPHOCYTES NFR BLD: 35 % (ref 13–44)
MCH RBC QN AUTO: 29.2 PG (ref 26.1–32.9)
MCHC RBC AUTO-ENTMCNC: 30.9 G/DL (ref 31.4–35)
MCV RBC AUTO: 94.6 FL (ref 79.6–97.8)
MM INDURATION POC: 0 MM (ref 0–5)
MONOCYTES # BLD: 0.6 K/UL (ref 0.1–1.3)
MONOCYTES NFR BLD: 11 % (ref 4–12)
NEUTS SEG # BLD: 3.1 K/UL (ref 1.7–8.2)
NEUTS SEG NFR BLD: 53 % (ref 43–78)
NRBC # BLD: 0 K/UL (ref 0–0.2)
PLATELET # BLD AUTO: 335 K/UL (ref 150–450)
PMV BLD AUTO: 9.1 FL (ref 9.4–12.3)
POTASSIUM SERPL-SCNC: 3.9 MMOL/L (ref 3.5–5.1)
PPD POC: NEGATIVE NEGATIVE
PROTHROMBIN TIME: 14.9 SEC (ref 11.5–14.5)
RBC # BLD AUTO: 3.73 M/UL (ref 4.05–5.2)
SERVICE CMNT-IMP: ABNORMAL
SODIUM SERPL-SCNC: 143 MMOL/L (ref 136–145)
WBC # BLD AUTO: 5.8 K/UL (ref 4.3–11.1)

## 2018-09-20 PROCEDURE — 65270000029 HC RM PRIVATE

## 2018-09-20 PROCEDURE — 80048 BASIC METABOLIC PNL TOTAL CA: CPT

## 2018-09-20 PROCEDURE — 97166 OT EVAL MOD COMPLEX 45 MIN: CPT

## 2018-09-20 PROCEDURE — 36415 COLL VENOUS BLD VENIPUNCTURE: CPT

## 2018-09-20 PROCEDURE — 77030020263 HC SOL INJ SOD CL0.9% LFCR 1000ML

## 2018-09-20 PROCEDURE — 74011000250 HC RX REV CODE- 250: Performed by: INTERNAL MEDICINE

## 2018-09-20 PROCEDURE — 85610 PROTHROMBIN TIME: CPT

## 2018-09-20 PROCEDURE — 77030018836 HC SOL IRR NACL ICUM -A

## 2018-09-20 PROCEDURE — 74011250637 HC RX REV CODE- 250/637: Performed by: INTERNAL MEDICINE

## 2018-09-20 PROCEDURE — 74011250636 HC RX REV CODE- 250/636: Performed by: INTERNAL MEDICINE

## 2018-09-20 PROCEDURE — 97530 THERAPEUTIC ACTIVITIES: CPT

## 2018-09-20 PROCEDURE — 77030019605

## 2018-09-20 PROCEDURE — 74011000258 HC RX REV CODE- 258: Performed by: INTERNAL MEDICINE

## 2018-09-20 PROCEDURE — 85025 COMPLETE CBC W/AUTO DIFF WBC: CPT

## 2018-09-20 RX ORDER — CEFPODOXIME PROXETIL 200 MG/1
200 TABLET, FILM COATED ORAL EVERY 12 HOURS
Status: DISCONTINUED | OUTPATIENT
Start: 2018-09-20 | End: 2018-09-20

## 2018-09-20 RX ORDER — FACIAL-BODY WIPES
10 EACH TOPICAL DAILY PRN
Status: DISCONTINUED | OUTPATIENT
Start: 2018-09-20 | End: 2018-09-21 | Stop reason: HOSPADM

## 2018-09-20 RX ORDER — CEFPODOXIME PROXETIL 200 MG/1
200 TABLET, FILM COATED ORAL EVERY 12 HOURS
Status: DISCONTINUED | OUTPATIENT
Start: 2018-09-20 | End: 2018-09-21 | Stop reason: HOSPADM

## 2018-09-20 RX ADMIN — CARBIDOPA AND LEVODOPA 1 TABLET: 25; 250 TABLET ORAL at 06:05

## 2018-09-20 RX ADMIN — Medication 5 ML: at 22:31

## 2018-09-20 RX ADMIN — VANCOMYCIN HYDROCHLORIDE 500 MG: 1 INJECTION, POWDER, LYOPHILIZED, FOR SOLUTION INTRAVENOUS at 12:59

## 2018-09-20 RX ADMIN — Medication 1 AMPULE: at 08:44

## 2018-09-20 RX ADMIN — CEFPODOXIME PROXETIL 200 MG: 200 TABLET, FILM COATED ORAL at 22:30

## 2018-09-20 RX ADMIN — QUETIAPINE FUMARATE 12.5 MG: 25 TABLET, FILM COATED ORAL at 17:24

## 2018-09-20 RX ADMIN — CARBIDOPA AND LEVODOPA 2 TABLET: 25; 100 TABLET, EXTENDED RELEASE ORAL at 22:30

## 2018-09-20 RX ADMIN — GABAPENTIN 100 MG: 100 CAPSULE ORAL at 17:24

## 2018-09-20 RX ADMIN — VANCOMYCIN HYDROCHLORIDE 500 MG: 1 INJECTION, POWDER, LYOPHILIZED, FOR SOLUTION INTRAVENOUS at 04:19

## 2018-09-20 RX ADMIN — FAMOTIDINE 20 MG: 10 INJECTION, SOLUTION INTRAVENOUS at 08:43

## 2018-09-20 RX ADMIN — CARBIDOPA AND LEVODOPA 1 TABLET: 25; 250 TABLET ORAL at 11:34

## 2018-09-20 RX ADMIN — MORPHINE SULFATE 2 MG: 2 INJECTION, SOLUTION INTRAMUSCULAR; INTRAVENOUS at 15:21

## 2018-09-20 RX ADMIN — METOPROLOL SUCCINATE 25 MG: 25 TABLET, EXTENDED RELEASE ORAL at 08:44

## 2018-09-20 RX ADMIN — CARBIDOPA AND LEVODOPA 1 TABLET: 25; 250 TABLET ORAL at 15:00

## 2018-09-20 RX ADMIN — QUETIAPINE FUMARATE 12.5 MG: 25 TABLET, FILM COATED ORAL at 08:44

## 2018-09-20 RX ADMIN — MORPHINE SULFATE 2 MG: 2 INJECTION, SOLUTION INTRAMUSCULAR; INTRAVENOUS at 08:47

## 2018-09-20 RX ADMIN — LORAZEPAM 1 MG: 2 INJECTION INTRAMUSCULAR; INTRAVENOUS at 13:30

## 2018-09-20 RX ADMIN — MORPHINE SULFATE 2 MG: 2 INJECTION, SOLUTION INTRAMUSCULAR; INTRAVENOUS at 22:29

## 2018-09-20 RX ADMIN — LORAZEPAM 1 MG: 2 INJECTION INTRAMUSCULAR; INTRAVENOUS at 22:30

## 2018-09-20 RX ADMIN — DAKIN'S SOLUTION 0.125% (QUARTER STRENGTH): 0.12 SOLUTION at 08:44

## 2018-09-20 RX ADMIN — GABAPENTIN 100 MG: 100 CAPSULE ORAL at 22:30

## 2018-09-20 RX ADMIN — Medication 5 ML: at 06:06

## 2018-09-20 RX ADMIN — Medication 5 ML: at 13:41

## 2018-09-20 RX ADMIN — CARBIDOPA AND LEVODOPA 1 TABLET: 25; 250 TABLET ORAL at 18:05

## 2018-09-20 RX ADMIN — GABAPENTIN 100 MG: 100 CAPSULE ORAL at 08:44

## 2018-09-20 RX ADMIN — Medication 1 AMPULE: at 22:30

## 2018-09-20 NOTE — PROGRESS NOTES
Problem: Mobility Impaired (Adult and Pediatric) Goal: *Acute Goals and Plan of Care (Insert Text) 1. Ms. Fabi Willoughby will perform sit to stand and bed to chair with supervision in 7 days. 2.  Ms. Fabi Willoughby will perform gait 1000 ft with supervision on 1 in 7 days. PHYSICAL THERAPY: Daily Note, Treatment Day: 1st, AM 9/20/2018 INPATIENT: Hospital Day: 5 [place] / Product Type: Medicare /  
  
NAME/AGE/GENDER: Vandana rBadshaw is a 75 y.o. female PRIMARY DIAGNOSIS: Sepsis ([facility] Sacral ulcer ([address] Parkinson disease ([facility] [address] Metabolic encephalopathy Sepsis ([facility] Sepsis ([facility] [address] [facility] ICD-10: Treatment Diagnosis:  
 · Generalized Muscle Weakness (M62.81) · Difficulty in walking, Not elsewhere classified (R26.2) · Ataxic gait (R26.0) Precaution/Allergies: 
Codeine; Penicillins; and Sulfa (sulfonamide antibiotics) ASSESSMENT:  
 
Ms. Fabi Willoughby did settled last night after ambulation. We used the same strategy again today and waited until she was restless and wanted to get up. It only required 1 person assist today. Held both hands in 1 hand and used her brief as a gait belt. Her balance was good and she really responded to verbal cue \"big steps\" she would immediately increase her stride. She would revert back after a while however once given the command would again increase her stride. She stated she had to use the bathroom so we went in to the bathroom and she was successful. Just needed assist with cleaning. She was hungry and wanted a snack took the opportunity to see how her sitting balance would be in the chair. Her posture is perfect and she sits quite nicely. Once finished her snack she wanted to walk again to increased the distance this time. At the end of this trip it was clear she was tired because she was shuffling more and not as able to correct. She is making great progress with mobility completely met gait goal.  Will adjust it. This section established at most recent assessment PROBLEM LIST (Impairments causing functional limitations): 1. Decreased Ambulation Ability/Technique 2. Decreased Activity Tolerance INTERVENTIONS PLANNED: (Benefits and precautions of physical therapy have been discussed with the patient.) 1. Gait Training 2. Therapeutic Activites 3. Transfer Training TREATMENT PLAN: Frequency/Duration: 3 times a week for duration of hospital stay Rehabilitation Potential For Stated Goals: Fair RECOMMENDED REHABILITATION/EQUIPMENT: (at time of discharge pending progress): Due to the probability of continued deficits (see above) this patient will likely need continued skilled physical therapy after discharge. Equipment:  
? None at this time HISTORY:  
History of Present Injury/Illness (Reason for Referral): This is a 76-year-old female patient who has a very well known history of Parkinson disease, depression and atrial fibrillation on anticoagulation with Coumadin. She was brought into the emergency room this evening because according to her friends, since yesterday she has been more confused. Her Parkinson's disease has gotten worse with extremely severe tremors and she seemed to be dehydrated. The amount of information that the patient can provide is limited. This is the same case with her friends and neighbors. She lives at home with her wheelchair bound , and apparently he also has a stone, which is not involved in her care. Her friends have severe concerns about proper medication administration, diet and general care. She has home health services and apparently she also has home hospice services, but even with that apparently her care has been suboptimal.  When she arrived into the emergency room, she was severely dehydrated. Her blood pressure was 134/62. Her heart rate was 145.   Her O2 saturation was 95% and her respiratory rate was 20. She was having severe and violent tremors in her upper extremities every time she tried to do any movement. Her lungs were clear to auscultation. Her abdomen was soft and nontender, and she was found to have a deep sacral ulcer with purulent secretion. Her initial blood workup reported a white blood cell count of 14,000, stable hemoglobin, normal platelets. INR of 4.7. Normal electrolytes, normal creatinine level, normal liver function panel. A chest x-ray was done and showed no evidence of acute pulmonary disease. Urinary dipstick was done and it was not suggestive of UTI. The patient received IV ceftriaxone in the emergency room and she was presented to the hospitalist team to be admitted. Past Medical History/Comorbidities: Ms. Deyvi Hernandez  has a past medical history of Arrhythmia (7/14/2016); Atrial fibrillation ([facility] [address] (8/24/2015); Dry eye; Heart disease (7/14/2016); High blood pressure (7/14/2016); History of basal cell cancer (7/14/2016); Hyperlipidemia (7/14/2016); Hypertension (7/14/2016); Presence of cardiac pacemaker (8/24/2015); and Tremor (7/14/2016). Ms. Deyvi Hernandez  has a past surgical history that includes hx other surgical; hx other surgical; hx salpingo-oophorectomy; hx colonoscopy (12/2011); hx other surgical (1982); hx tonsillectomy; hx heart catheterization; hx other surgical (6/12); and hx partial hysterectomy. Social History/Living Environment:  
Home Environment: Private residence # Steps to Enter: 5 One/Two Story Residence: One story Living Alone: No 
Support Systems: Spouse/Significant Other/Partner (however spouse is on oxygen and in a w/c) Patient Expects to be Discharged to[de-identified] Skilled nursing facility Current DME Used/Available at Home: Walker, Oxygen, portable Prior Level of Function/Work/Activity: 
Shuffle gait  
age, parkinsons Number of Personal Factors/Comorbidities that affect the Plan of Care: 1-2: MODERATE COMPLEXITY EXAMINATION:  
 Most Recent Physical Functioning:  
Gross Assessment: 
AROM: Generally decreased, functional 
PROM: Generally decreased, functional 
Strength: Generally decreased, functional 
Coordination: Generally decreased, functional 
Tone: Abnormal 
         
  
Posture: 
  
Balance: 
Sitting: Impaired Sitting - Static: Good (unsupported) Sitting - Dynamic: Fair (occasional) Standing: Impaired Standing - Static: Good Standing - Dynamic : Fair Bed Mobility: 
Rolling: Supervision Supine to Sit: Contact guard assistance Wheelchair Mobility: 
  
Transfers: 
Sit to Stand: Contact guard assistance Stand to Sit: Contact guard assistance Gait: 
  
Base of Support: Narrowed Speed/Jasmin: Slow;Shuffled Step Length: Right shortened;Left shortened Distance (ft): 100 Feet (ft) (then 200 ft after using the bathroom and sitting and eating some pudding working on sitting balance. ) Ambulation - Level of Assistance: Contact guard assistance;Assist x1 Interventions: Verbal cues; Tactile cues; Safety awareness training;Manual cues Body Structures Involved: 1. Muscles Body Functions Affected: 1. Movement Related Activities and Participation Affected: 1. Mobility Number of elements that affect the Plan of Care: 3: MODERATE COMPLEXITY CLINICAL PRESENTATION:  
Presentation: Evolving clinical presentation with changing clinical characteristics: MODERATE COMPLEXITY CLINICAL DECISION MAKING:  
[facility] MIRAGE -PAC 6 Clicks Basic Mobility Inpatient Short Form How much difficulty does the patient currently have. .. Unable A Lot A Little None 1. Turning over in bed (including adjusting bedclothes, sheets and blankets)? [] 1   [] 2   [x] 3   [] 4  
2. Sitting down on and standing up from a chair with arms ( e.g., wheelchair, bedside commode, etc.)   [] 1   [] 2   [x] 3   [] 4  
3. Moving from lying on back to sitting on the side of the bed?    [] 1   [] 2   [x] 3   [] 4  
 How much help from another person does the patient currently need. .. Total A Lot A Little None 4. Moving to and from a bed to a chair (including a wheelchair)? [] 1   [] 2   [x] 3   [] 4  
5. Need to walk in hospital room? [] 1   [x] 2   [] 3   [] 4  
6. Climbing 3-5 steps with a railing? [x] 1   [] 2   [] 3   [] 4  
© 2007, Trustees of [address] [place] [address] [address] [id number], under license to [employer]. All rights reserved Score:  Initial: 15 Most Recent: X (Date: -- ) Interpretation of Tool:  Represents activities that are increasingly more difficult (i.e. Bed mobility, Transfers, Gait). Score 24 23 22-20 19-15 14-10 9-7 6 Modifier CH CI CJ CK CL CM CN   
 
? Mobility - Walking and Moving Around:  
  - CURRENT STATUS: CK - 40%-59% impaired, limited or restricted  - GOAL STATUS: CK - 40%-59% impaired, limited or restricted  - D/C STATUS:  ---------------To be determined--------------- [place] / [place] B / Product Type: Medicare /   
 
Medical Necessity:    
· Patient is expected to demonstrate progress in functional technique to decrease assistance required with mobility and gait. . 
Reason for Services/Other Comments: 
· Patient continues to require present interventions due to patient's inability to function at baseline. .  
Use of outcome tool(s) and clinical judgement create a POC that gives a: Questionable prediction of patient's progress: MODERATE COMPLEXITY  
  
 
 
 
TREATMENT:  
(In addition to Assessment/Re-Assessment sessions the following treatments were rendered) Pre-treatment Symptoms/Complaints: wanting to walk Pain: Initial:  
Pain Intensity 1: 0  Post Session:   Resting comfortably Therapeutic Activity: (    30): Therapeutic activities including Bed transfers, Chair transfers, Toilet transfers and Ambulation on level ground to improve mobility. Required moderate Verbal cues; Tactile cues;Safety awareness training;Manual cues to promote motor control of bilateral, upper extremity(s), lower extremity(s). Braces/Orthotics/Lines/Etc:  
· O2 Device: Room air Treatment/Session Assessment:   
· Response to Treatment:  good · Interdisciplinary Collaboration:  
o Registered Nurse · After treatment position/precautions:  
o Supine in bed 
o Call light within reach 
o RN notified · Compliance with Program/Exercises: Will assess as treatment progresses. · Recommendations/Intent for next treatment session: \"Next visit will focus on advancements to more challenging activities and reduction in assistance provided\". Total Treatment Duration: PT Patient Time In/Time Out Time In: 1100 Time Out: 1130 Christophe Conley, PT
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
## 2018-09-20 NOTE — PROGRESS NOTES
1. Patient will complete lower body bathing and dressing with SBA and adaptive equipment as needed. 2. Patient will complete toileting with SBA. 3. Patient will tolerate 23 minutes of OT treatment with 2-3 rest breaks to increase activity tolerance for ADLs. 4. Patient will complete functional transfers with MOD I and adaptive equipment as needed. Timeframe: 7 visits OCCUPATIONAL THERAPY: Initial Assessment and PM 9/20/2018 INPATIENT: Hospital Day: 5 [place] / Product Type: Medicare /  
  
NAME/AGE/GENDER: Marquita Huertas is a 75 y.o. female PRIMARY DIAGNOSIS:  Sepsis ([address] Sacral ulcer ([address] Parkinson disease ([address] Metabolic encephalopathy Sepsis ([facility] [address] Sepsis ([facility] [address] ICD-10: Treatment Diagnosis:  
 · Generalized Muscle Weakness (M62.81) Precautions/Allergies: 
   Codeine; Penicillins; and Sulfa (sulfonamide antibiotics) ASSESSMENT:  
 
Ms. Chas Whitley presents for the above. Upon arrival, pt supine in bed and very confused. Pt only able to orient to person this session. Pt believes to be at the movies and kept stating \"I want to go home. \" Unable to gather accurate background at this time. Today, pt performed rolling bed mobility with CGA. Upon 1st attempt at sitting up edge of bed, pt became very emotional. Pt placed back in supine. RN notified and requested PCT, who was familiar with pt, to come into room. PCT transferred pt to sitting upright edge of bed with no issues noted and OT evaluation resumed. Upon working on static and dynamic sitting balance, pt became very agitated and aggressive, with pt trying to stand to leave room; RN and PCT alerted. Pt left supine in bed with PCT and RN in room. At this time, pt seems to be functioning below baseline for ADLs and functional mobility. Pt would benefit from skilled OT services to address OT goals and plan of care. This section established at most recent assessment PROBLEM LIST (Impairments causing functional limitations): 1. Decreased Strength 2. Decreased ADL/Functional Activities 3. Decreased Transfer Abilities 4. Decreased Ambulation Ability/Technique 5. Decreased Balance 6. Decreased Activity Tolerance 7. Decreased Cognition INTERVENTIONS PLANNED: (Benefits and precautions of occupational therapy have been discussed with the patient.) 1. Activities of daily living training 2. Adaptive equipment training 3. Balance training 4. Clothing management 5. Cognitive training 6. Community reintergration 7. Donning&doffing training 8. Re-evaluation 9. Therapeutic activity 10. Therapeutic exercise TREATMENT PLAN: Frequency/Duration: Follow patient 3x/week to address above goals. Rehabilitation Potential For Stated Goals: Fair RECOMMENDED REHABILITATION/EQUIPMENT: (at time of discharge pending progress): Due to the probability of continued deficits (see above) this patient will likely need continued skilled occupational therapy after discharge. Equipment: ? TBD  
    
 
 
 
OCCUPATIONAL PROFILE AND HISTORY:  
History of Present Injury/Illness (Reason for Referral): 
See H&P Past Medical History/Comorbidities: Ms. Clifton Quan  has a past medical history of Arrhythmia (7/14/2016); Atrial fibrillation ([facility] [address] (8/24/2015); Dry eye; Heart disease (7/14/2016); High blood pressure (7/14/2016); History of basal cell cancer (7/14/2016); Hyperlipidemia (7/14/2016); Hypertension (7/14/2016); Presence of cardiac pacemaker (8/24/2015); and Tremor (7/14/2016). Ms. Clifton Quan  has a past surgical history that includes hx other surgical; hx other surgical; hx salpingo-oophorectomy; hx colonoscopy (12/2011); hx other surgical (1982); hx tonsillectomy; hx heart catheterization; hx other surgical (6/12); and hx partial hysterectomy. Social History/Living Environment:  
Home Environment: Private residence # Steps to Enter: 5 One/Two Story Residence: One story Living Alone: No 
Support Systems: Spouse/Significant Other/Partner Patient Expects to be Discharged to[de-identified] Skilled nursing facility Current DME Used/Available at Home: Walker, rolling Prior Level of Function/Work/Activity: 
Unable to assess Personal Factors:   
      Sex:  female Age:  75 y.o. Other factors that influence how disability is experienced by the patient:  Multiple co-morbidities, altered mental status Number of Personal Factors/Comorbidities that affect the Plan of Care: Expanded review of therapy/medical records (1-2):  MODERATE COMPLEXITY ASSESSMENT OF OCCUPATIONAL PERFORMANCE[de-identified]  
Activities of Daily Living:  
Basic ADLs (From Assessment) Complex ADLs (From Assessment) Feeding: Independent Oral Facial Hygiene/Grooming: Independent Bathing: Moderate assistance Upper Body Dressing: Minimum assistance Lower Body Dressing: Moderate assistance Toileting: Minimum assistance Instrumental ADL Meal Preparation: Maximum assistance Homemaking: Maximum assistance Grooming/Bathing/Dressing Activities of Daily Living Cognitive Retraining Safety/Judgement: Decreased awareness of environment;Decreased awareness of need for assistance;Decreased awareness of need for safety Bed/Mat Mobility Rolling: Minimum assistance Supine to Sit: Minimum assistance Sit to Supine: Minimum assistance Sit to Stand: Contact guard assistance Scooting: Minimum assistance Most Recent Physical Functioning:  
Gross Assessment: 
  
         
  
Posture: 
  
Balance: 
Sitting: Impaired Sitting - Static: Good (unsupported) Sitting - Dynamic: Fair (occasional) Standing: Impaired Standing - Static: Good Standing - Dynamic : Fair Bed Mobility: 
Rolling: Minimum assistance Supine to Sit: Minimum assistance Sit to Supine: Minimum assistance Scooting: Minimum assistance Wheelchair Mobility: 
  
Transfers: 
Sit to Stand: Contact guard assistance Stand to Sit: Contact guard assistance Patient Vitals for the past 6 hrs: 
 BP BP Patient Position SpO2 Pulse  
09/20/18 1135 167/62 Post activity 92 % 82 Mental Status Neurologic State: Alert Orientation Level: Oriented to person, Disoriented to place, Disoriented to situation, Disoriented to time Cognition: Decreased attention/concentration, Decreased command following, Poor safety awareness Safety/Judgement: Decreased awareness of environment, Decreased awareness of need for assistance, Decreased awareness of need for safety Physical Skills Involved: 
1. Balance 2. Strength 3. Gross Motor Control Cognitive Skills Affected (resulting in the inability to perform in a timely and safe manner): 1. Perception 2. Executive Function 3. Immediate Memory 4. Short Term Recall 5. Long Term Memory 6. Sustained Attention 7. Divided Attention 8. Comprehension 9. Expression Psychosocial Skills Affected: 1. Habits/Routines 2. Environmental Adaptation 3. Social Interaction Number of elements that affect the Plan of Care: 5+:  HIGH COMPLEXITY CLINICAL DECISION MAKING:  
[facility] AM-PAC 6 Clicks Daily Activity Inpatient Short Form How much help from another person does the patient currently need. .. Total A Lot A Little None 1. Putting on and taking off regular lower body clothing? [] 1   [x] 2   [] 3   [] 4  
2. Bathing (including washing, rinsing, drying)? [] 1   [x] 2   [] 3   [] 4  
3. Toileting, which includes using toilet, bedpan or urinal?   [] 1   [] 2   [x] 3   [] 4  
4. Putting on and taking off regular upper body clothing? [] 1   [] 2   [x] 3   [] 4  
5. Taking care of personal grooming such as brushing teeth? [] 1   [] 2   [] 3   [x] 4  
6. Eating meals? [] 1   [] 2   [] 3   [x] 4  
© 2007, Trustees of [facility] [place] [facility], under license to [employer]. All rights reserved Score:  Initial: 18 Most Recent: X (Date: -- ) Interpretation of Tool:  Represents activities that are increasingly more difficult (i.e. Bed mobility, Transfers, Gait). Score 24 23 22-20 19-15 14-10 9-7 6 Modifier CH CI CJ CK CL CM CN   
 
? Self Care:  
  - CURRENT STATUS: CK - 40%-59% impaired, limited or restricted  - GOAL STATUS: CJ - 20%-39% impaired, limited or restricted  - D/C STATUS:  ---------------To be determined--------------- [place] / [place] / Product Type: Medicare /   
 
Medical Necessity:    
· Patient is expected to demonstrate progress in strength, balance and functional technique to decrease assistance required with ADLs and functional mobility. [employer] [employer] Reason for Services/Other Comments: 
· Patient continues to require skilled intervention due to medical complications and patient unable to attend/participate in therapy as expected. Use of outcome tool(s) and clinical judgement create a POC that gives a: MODERATE COMPLEXITY  
 
 
 
TREATMENT:  
(In addition to Assessment/Re-Assessment sessions the following treatments were rendered) Pre-treatment Symptoms/Complaints:   
Pain: Initial:  
Pain Intensity 1: 0 /10 Post Session:  same Assessment/Reassessment only, no treatment provided today Braces/Orthotics/Lines/Etc:  
· IV 
· O2 Device: Room air Treatment/Session Assessment:   
· Response to Treatment:  eval only. · Interdisciplinary Collaboration:  
o Occupational Therapist 
o Registered Nurse 
o Certified Nursing Assistant/Patient Care Technician · After treatment position/precautions:  
o Supine in bed 
o Bed alarm/tab alert on 
o RN and PCT in room. · Compliance with Program/Exercises: Will assess as treatment progresses. · Recommendations/Intent for next treatment session: \"Next visit will focus on advancements to more challenging activities and reduction in assistance provided\". Total Treatment Duration: OT Patient Time In/Time Out Time In: 7147 Time Out: 6413 Una Nance, OT

## 2018-09-20 NOTE — PROGRESS NOTES
END OF SHIFT NOTE: 
 
Intake/Output Voiding: YES Catheter: NO 
Drain:  
External Female Catheter 09/18/18 (Active) Site Assessment Clean, dry, & intact 9/20/2018  3:10 AM  
Repositioned No 9/20/2018  3:10 AM  
Perineal Care No 9/20/2018  3:10 AM  
Wick Changed No 9/20/2018  3:10 AM  
Suction Canister/Tubing Changed Yes 9/20/2018  3:10 AM  
Urine Output (mL) 600 ml 9/20/2018  3:10 AM  
 
 
 
 
 
 
Stool:  1 occurrences. Stool Assessment Stool Appearance: Formed (09/19/18 2010) Emesis:  0 occurrences. VITAL SIGNS Patient Vitals for the past 12 hrs: 
 Temp Pulse Resp BP SpO2  
09/20/18 0340 98.1 °F (36.7 °C) 72 12 151/88 98 % 09/19/18 2350 97.7 °F (36.5 °C) 70 12 158/86 99 % 09/19/18 2112 97.1 °F (36.2 °C) (!) 110 16 (!) 132/93 100 % Pain Assessment Pain 1 Pain Scale 1: Visual (09/20/18 0340) Pain Intensity 1: 0 (09/20/18 0340) Patient Stated Pain Goal: 0 (09/19/18 2010) Pain Reassessment 1: Patient sleeping (09/19/18 2010) Pain Location 1: Buttocks (09/19/18 1227) Pain Orientation 1: Posterior (09/19/18 1227) Pain Description 1: Aching (09/19/18 1227) Pain Intervention(s) 1: Medication (see MAR) (09/19/18 1227) Ambulating No 
 
Additional Information:  
Pt had a good night and was alert with periodic confusion Raw area on chest possibly from tremors Morphine given 1x for wound dressing change. See [facility] Dressing change completed. See note and flowsheet Took meds with pudding No needs voiced at this time Shift report given to oncoming nurse at the bedside. Laverne Yoon

## 2018-09-20 NOTE — PROGRESS NOTES
Dressing change completed with 4x4, dakins solution and an abd. Morphine given and pt tolerated well.

## 2018-09-20 NOTE — PROGRESS NOTES
Shift: 9/20/18 (6526-3290) AM Handoff Nurse: Coral Glover RN Pain: Intermittent c/o pain to buttocks during the shift. Morphine IV administered. Neuro: A&Ox2. Intermittent confusion with hallucinations and agitation noted this afternoon. No c/o numbness or tingling. Cardio: S1/S2. SR paced. CV VS WNL for specified parameters. Resp: RA. Clear bilaterally. Symmetric chest wall excursion. Resp VS WNL. GI/: Incontinent briefs/purewick at night. Urine is yellow and clear. Last BM: 9/20/18. Patient with c/o constipation. Dulcolax suppository administered without success. Milk of molasses enema administered with some success; small hard stool noted. No c/o N/V. Diet: Tolerating diet. Moderate intake noted. See I&O for further details. Ambulation: Pt ambulates with assistance of nursing staff/PT. No falls during the shift. Linen Change & Chlorhexidine bath completed: 9/20/18 Additional Information: While working with OT this afternoon, patient grew agitated and appeared to be hallucinating. Ambulation at this time was not an option as patient did not recognize staff and refused to be touched. Ativan IV was administered. Patient calmed down an hour later. 24hr PPD read. Possible discharge to [facility] tomorrow 9/21. Dressing to pressure ulcer changed per Gen Surg instructions PRN (NS wet to dry). Elevated BP noted this afternoon.  Keyanna Guy would like to be notified at time of discharge [phone]. Continue with POC. Patient Vitals for the past 12 hrs: 
 Temp Pulse Resp BP SpO2  
09/20/18 1610 98 °F (36.7 °C) (!) 49 14 (!) 150/94 95 % 09/20/18 1135 98.4 °F (36.9 °C) 82 17 167/62 92 %  
09/20/18 0745 98.2 °F (36.8 °C) 70 16 127/67 97 % EOS Handoff Nurse: Coral Glover RN

## 2018-09-20 NOTE — PROGRESS NOTES
H&P/Consult Note/Progress Note/Office Note:  
Kathy Ruggiero  MRN: [id number]  :1950  Age:67 y.o. 
 
HPI: Kathy Ruggiero is a 75 y.o. female who has PMHx of Parkinson disease, a fib on Coumadin s/p PPM, HTn, HLD, tremors, meningioma who presented to ED on the night of 18 with increased confusion, agitation, and possible fever per a friend and home health. She lives at home with  who is wheelchair bound and has hospice and home health. On admission she was found to have INR 4.9 and sacral ulcer with purulent drainage. Tmax 99.4, tachycardic, on room air. She was started on IV Cefepime and Vanco. Based on ED notes, there is some concern that pt is not receiving adequate care in receiving her meds and diet. No family or friends have been present at bedside for out initial consultation. Cultures with gm + cocci and yeast so far. 18 CT pelvis There is a decubitus ulcer overlying the coccyx. The ulcer does extend to the bone. There is no definite erosion of the adjacent bone. There is no CT evidence of osteomyelitis. There are no fractures. There is degenerative change in the lower lumbar spine. 
  
There is mild prominence of the stool pattern. There are no focal inflammatory 
changes or fluid collections. There is mild diffuse atherosclerosis. 
  
IMPRESSION: 
1. Sacral decubitus ulcer. No evidence of osteomyelitis. 2.  Constipation. 18 No Interval changes. confused 18 more lucid; no new complaints 18 up on side of bed, no complaints, mildly confused Past Medical History:  
Diagnosis Date  Arrhythmia 2016  Atrial fibrillation ([facility] [address] 2015  Dry eye  Heart disease 2016  High blood pressure 2016  History of basal cell cancer 2016  Hyperlipidemia 2016  Hypertension 2016  Presence of cardiac pacemaker 2015  Tremor 2016 Past Surgical History: Procedure Laterality Date  HX COLONOSCOPY  12/2011  HX HEART CATHETERIZATION    
 HX OTHER SURGICAL Cardiac Electrophysiology Mapping and Ablation  HX OTHER SURGICAL Lung Biopsy  HX OTHER SURGICAL  1982  
 skin cancer excision, basal cell carcinoma on forehead  HX OTHER SURGICAL  6/12 Cardiac pacemaker placement  HX PARTIAL HYSTERECTOMY Abdominal   
 HX SALPINGO-OOPHORECTOMY Laparoscopic, with MARIA ELENA  
 HX TONSILLECTOMY Current Facility-Administered Medications Medication Dose Route Frequency  vancomycin (VANCOCIN) 500 mg in 0.9% sodium chloride 100 mL IVPB  500 mg IntraVENous Q8H  
 warfarin (COUMADIN) tablet 5 mg  5 mg Oral QPM  
 QUEtiapine (SEROquel) tablet 12.5 mg  12.5 mg Oral BID  carbidopa-levodopa (SINEMET)  mg per tablet 1 Tab  1 Tab Oral QID  carbidopa-levodopa ER (SINEMET CR)  mg per tablet 2 Tab  2 Tab Oral QHS  gabapentin (NEURONTIN) capsule 100 mg  100 mg Oral TID  metoprolol succinate (TOPROL-XL) XL tablet 25 mg  25 mg Oral DAILY  famotidine (PF) (PEPCID) 20 mg in sodium chloride 0.9% 10 mL injection  20 mg IntraVENous DAILY  LORazepam (ATIVAN) injection 1 mg  1 mg IntraVENous Q4H PRN  
 sodium chloride (NS) flush 5 mL  5 mL InterCATHeter Q8H  
 sodium chloride (NS) flush 5-10 mL  5-10 mL InterCATHeter PRN  
 ondansetron (ZOFRAN) injection 4 mg  4 mg IntraVENous Q6H PRN  
 acetaminophen (TYLENOL) tablet 650 mg  650 mg Oral Q6H PRN  
 morphine injection 2 mg  2 mg IntraVENous Q4H PRN  
 oxyCODONE IR (ROXICODONE) tablet 5 mg  5 mg Oral Q6H PRN  
 alcohol 62% (NOZIN) nasal  1 Ampule  1 Ampule Topical Q12H  
 influenza vaccine 2018-19 (6 mos+)(PF) (FLUARIX QUAD/FLULAVAL QUAD) injection 0.5 mL  0.5 mL IntraMUSCular PRIOR TO DISCHARGE  sodium hypochlorite (QUARTER STRENGTH DAKIN'S) 0.125% irrigation (bottle)   Topical BID  dextrose 5 % - 0.45% NaCl infusion  75 mL/hr IntraVENous CONTINUOUS  
 
 Codeine; Penicillins; and Sulfa (sulfonamide antibiotics) Social History Social History  Marital status:  Spouse name: N/A  
 Number of children: N/A  
 Years of education: N/A Social History Main Topics  Smoking status: Never Smoker  Smokeless tobacco: Never Used  Alcohol use No  
 Drug use: No  
 Sexual activity: Not Asked Other Topics Concern  None Social History Narrative History Smoking Status  Never Smoker Smokeless Tobacco  
 Never Used Family History Problem Relation Age of Onset  Heart Disease Mother  Thyroid Disease Mother  Heart Disease Father  Cancer Father Lung Cancer ROS: Limited by patients mental status. Subjective fever. Comprehensive review of systems was otherwise unremarkable except as noted above. Physical Exam:  
Visit Vitals  /67 (BP 1 Location: Left arm, BP Patient Position: At rest)  Pulse 70  Temp 98.2 °F (36.8 °C)  Resp 16  
 Ht 5' (1.524 m)  Wt 108 lb 8 oz (49.2 kg)  LMP  (LMP Unknown)  SpO2 97%  Breastfeeding No  
 BMI 21.19 kg/m2 Constitutional: Alert, oriented to self only, cooperative patient in no acute distress; appears stated age Eyes:Sclera are clear. EOMs intact ENMT: no external lesions gross hearing normal; no obvious neck masses, no ear or lip lesions, nares normal 
CV: regular rate Resp: No JVD. Breathing is  non-labored; no audible wheezing. CTAB. GI: soft and non-distended, non tender. + BS. Musculoskeletal: severe bilateral UE tremor Skin: stage 3-4 sacral ulcer with some undermining inferiorly. Wound bed is clear following Dakins; no surrounding cellulitis; no visible debris Bone palpable but not visible, covered with very thin layer of tissue. Neuro: Moves all 4, severe tremors Psychiatric: + memory impairment Recent vitals (if inpt): 
Patient Vitals for the past 24 hrs: 
 BP Temp Pulse Resp SpO2 Weight 09/20/18 0745 127/67 98.2 °F (36.8 °C) 70 16 97 % -  
09/20/18 0340 151/88 98.1 °F (36.7 °C) 72 12 98 % -  
09/19/18 2350 158/86 97.7 °F (36.5 °C) 70 12 99 % -  
09/19/18 2112 (!) 132/93 97.1 °F (36.2 °C) (!) 110 16 100 % 108 lb 8 oz (49.2 kg) 09/19/18 1500 180/87 97.7 °F (36.5 °C) 96 18 99 % -  
09/19/18 1126 154/65 98.8 °F (37.1 °C) 86 17 97 % - Labs: 
Recent Labs  
   09/20/18 
 0410 WBC  5.8 HGB  10.9* PLT  335 NA  143  
K  3.9 CL  107 CO2  30 BUN  8  
CREA  0.38* GLU  81 PTP  14.9* INR  1.2 Lab Results Component Value Date/Time WBC 5.8 09/20/2018 04:10 AM  
 HGB 10.9 (L) 09/20/2018 04:10 AM  
 PLATELET 451 80/49/5245 04:10 AM  
 Sodium 143 09/20/2018 04:10 AM  
 Potassium 3.9 09/20/2018 04:10 AM  
 Chloride 107 09/20/2018 04:10 AM  
 CO2 30 09/20/2018 04:10 AM  
 BUN 8 09/20/2018 04:10 AM  
 Creatinine 0.38 (L) 09/20/2018 04:10 AM  
 Glucose 81 09/20/2018 04:10 AM  
 INR 1.2 09/20/2018 04:10 AM  
 aPTT 49.3 (H) 02/06/2018 01:04 AM  
 Bilirubin, total 0.3 09/17/2018 04:31 AM  
 AST (SGOT) 29 09/17/2018 04:31 AM  
 ALT (SGPT) 7 (L) 09/17/2018 04:31 AM  
 Alk. phosphatase 80 09/17/2018 04:31 AM  
 Ammonia <10 (L) 02/06/2018 01:04 AM  
 Troponin-I, Qt. <0.02 (L) 04/29/2018 05:34 AM  
 
 
I reviewed recent labs and recent radiologic studies. CT Results (most recent): 
 
Results from Hospital Encounter encounter on 09/16/18 CT PELV W CONT Narrative CT of the pelvis INDICATION:  Sacral decubitus ulcer Multiple axial images were obtained through the pelvis after intravenous 
injection of 100mL of Isovue 370. Radiation dose reduction techniques were used 
for this study: All CT scans performed at this facility use one or all of the 
following: Automated exposure control, adjustment of the mA and/or kVp according 
to patient's size, iterative reconstruction. FINDINGS: There is a decubitus ulcer overlying the coccyx. The ulcer does extend to the bone. There is no definite erosion of the adjacent bone. There 
is no CT evidence of osteomyelitis. There are no fractures. There is 
degenerative change in the lower lumbar spine. There is mild prominence of the stool pattern. There are no focal inflammatory 
changes or fluid collections. There is mild diffuse atherosclerosis. Impression IMPRESSION: 
1. Sacral decubitus ulcer. No evidence of osteomyelitis. 2.  Constipation. XR Results (most recent): 
 
Results from Hospital Encounter encounter on 09/16/18 XR CHEST PORT Narrative Portable chest xray COMPARISON: April 29, 2018. CLINICAL HISTORY: Chest pain. FINDINGS: 
 
There is a stable left-sided cardiac pacer. Cardiac mediastinal contour is 
within normal limits. No pulmonary consolidation, pleural effusion or 
pneumothorax thorax. No pulmonary edema. Bones are osteopenic. Small calcified 
granuloma is present in the left lung. Impression IMPRESSION: 
 
No acute pulmonary disease. I independently reviewed radiology images for studies I described above or studies I have ordered. Admission date (for inpatients): 9/16/2018 * No surgery found *  * No surgery found * ASSESSMENT/PLAN: 
Problem List  Date Reviewed: 11/6/2017 Codes Class Noted Severe protein-calorie malnutrition ([facility] ICD-10-CM: F65 ICD-9-CM: 986  9/18/2018 Debility ICD-10-CM: R53.81 ICD-9-CM: 799.3  9/17/2018 Metabolic encephalopathy EVK-87-NM: G93.41 
ICD-9-CM: 348.31  9/16/2018 * (Principal)Sepsis ([facility] ICD-10-CM: A41.9 ICD-9-CM: 038.9, 995.91  9/16/2018 Pressure ulcer of sacral region, stage 4 (HCC) ICD-10-CM: T13.604 ICD-9-CM: 707.03, 707.24  9/16/2018 Tremors of nervous system ICD-10-CM: R25.1 ICD-9-CM: 781.0  2/6/2018 Meningioma ([facility] ICD-10-CM: D32.9 ICD-9-CM: 225.2  2/6/2018 Parkinson disease [facility]) ICD-10-CM: G20 
ICD-9-CM: 332.0  8/15/2016 RBD (REM behavioral disorder) ICD-10-CM: G47.52 
ICD-9-CM: 327.42  8/15/2016 Hypertension ICD-10-CM: I10 
ICD-9-CM: 401.9  7/14/2016 Hyperlipidemia ICD-10-CM: E78.5 ICD-9-CM: 272.4  7/14/2016 History of basal cell cancer ICD-10-CM: Y02.796 ICD-9-CM: V10.83  7/14/2016 Atrial fibrillation ([facility] [address] ICD-10-CM: I48.91 
ICD-9-CM: 427.31  8/24/2015 Presence of cardiac pacemaker ICD-10-CM: Z95.0 ICD-9-CM: V45.01  8/24/2015 Principal Problem: 
  Sepsis ([facility] [address] (9/16/2018) Active Problems: 
  Parkinson disease ([facility] [address] (8/15/2016) Metabolic encephalopathy (9/06/8337) Pressure ulcer of sacral region, stage 4 ([facility] [address] (9/16/2018) Debility (9/17/2018) Severe protein-calorie malnutrition ([facility] [address] (9/18/2018) Plan: 
Debilitated with stage 4 sacral pressure ulcer. Sepsis appears resolved CT pelvis showed no evidence of osteomeylitis IV Vanc/Cefepime --->PO Abx The wound is showing signs of granulation and debris is mostly cleared with just a few days of Dakins Will switch to saline wet to dry dressings She will not need debridement this hospitalization. I will follow her as outpt I recommend SNF secondary to profound debilitation and stage 4 sacral pressure ulcer wound needs She needs air mattress at SNF and much aggressive wound OK to resume coumadin; INR 4.9-->1.8 I wrote for wound care and follow-up in dc instructions section of connect care We will see her Monday if she is still inpt. Otherwise will see her in the office Signed:  Janelle Barber NP I have seen and examined the patient with the Nurse Practitioner and agree with the above assessment and plan.   
 
Julio Meeks MD

## 2018-09-20 NOTE — PROGRESS NOTES
Warfarin dosing per pharmacist 
 
Ness Cooney is a 75 y.o. female. Height: 5' (152.4 cm)    Weight: 49.2 kg (108 lb 8 oz) Indication:  Afib Goal INR:  2-3 Home dose:  5 mg daily Risk factors or significant drug interactions:  n/a Other anticoagulants:  none Daily Monitoring Date  INR     Warfarin dose HGB              Notes 9/16  4.7  Hold  10.0 
9/17  4.9  Hold  8.6  Vit K 10 mg PO x 1 
9/18  1.8  Hold  10.4 9/19  1.3  5 mg  10.2 Pharmacy consulted to dose. INR 1.2 after warfarin restarted just yesterday. Continue 5 mg currently. Following daily INR. Francine Aguilar Thank you, Yevgeniy Reid, Pharm. D. Clinical Pharmacist 
[phone]

## 2018-09-21 VITALS
HEART RATE: 71 BPM | HEIGHT: 60 IN | OXYGEN SATURATION: 99 % | DIASTOLIC BLOOD PRESSURE: 79 MMHG | WEIGHT: 108.9 LBS | RESPIRATION RATE: 16 BRPM | SYSTOLIC BLOOD PRESSURE: 138 MMHG | BODY MASS INDEX: 21.38 KG/M2 | TEMPERATURE: 98 F

## 2018-09-21 PROBLEM — G20 PARKINSON'S DISEASE DEMENTIA (HCC): Status: ACTIVE | Noted: 2018-09-21

## 2018-09-21 PROBLEM — F02.80 PARKINSON'S DISEASE DEMENTIA (HCC): Status: ACTIVE | Noted: 2018-09-21

## 2018-09-21 LAB
ANION GAP SERPL CALC-SCNC: 8 MMOL/L (ref 7–16)
BACTERIA SPEC CULT: NORMAL
BACTERIA SPEC CULT: NORMAL
BASOPHILS # BLD: 0.1 K/UL (ref 0–0.2)
BASOPHILS NFR BLD: 1 % (ref 0–2)
BUN SERPL-MCNC: 9 MG/DL (ref 8–23)
CALCIUM SERPL-MCNC: 8.7 MG/DL (ref 8.3–10.4)
CHLORIDE SERPL-SCNC: 102 MMOL/L (ref 98–107)
CO2 SERPL-SCNC: 31 MMOL/L (ref 21–32)
CREAT SERPL-MCNC: 0.47 MG/DL (ref 0.6–1)
DIFFERENTIAL METHOD BLD: ABNORMAL
EOSINOPHIL # BLD: 0 K/UL (ref 0–0.8)
EOSINOPHIL NFR BLD: 0 % (ref 0.5–7.8)
ERYTHROCYTE [DISTWIDTH] IN BLOOD BY AUTOMATED COUNT: 16.1 %
GLUCOSE SERPL-MCNC: 102 MG/DL (ref 65–100)
HCT VFR BLD AUTO: 38.6 % (ref 35.8–46.3)
HGB BLD-MCNC: 12.1 G/DL (ref 11.7–15.4)
IMM GRANULOCYTES # BLD: 0 K/UL (ref 0–0.5)
IMM GRANULOCYTES NFR BLD AUTO: 0 % (ref 0–5)
LYMPHOCYTES # BLD: 2.3 K/UL (ref 0.5–4.6)
LYMPHOCYTES NFR BLD: 33 % (ref 13–44)
MCH RBC QN AUTO: 29.4 PG (ref 26.1–32.9)
MCHC RBC AUTO-ENTMCNC: 31.3 G/DL (ref 31.4–35)
MCV RBC AUTO: 93.9 FL (ref 79.6–97.8)
MM INDURATION POC: NORMAL MM (ref 0–5)
MONOCYTES # BLD: 0.6 K/UL (ref 0.1–1.3)
MONOCYTES NFR BLD: 8 % (ref 4–12)
NEUTS SEG # BLD: 4 K/UL (ref 1.7–8.2)
NEUTS SEG NFR BLD: 58 % (ref 43–78)
NRBC # BLD: 0 K/UL (ref 0–0.2)
PLATELET # BLD AUTO: 390 K/UL (ref 150–450)
PMV BLD AUTO: 9.5 FL (ref 9.4–12.3)
POTASSIUM SERPL-SCNC: 3.9 MMOL/L (ref 3.5–5.1)
PPD POC: NORMAL NEGATIVE
RBC # BLD AUTO: 4.11 M/UL (ref 4.05–5.2)
SERVICE CMNT-IMP: NORMAL
SERVICE CMNT-IMP: NORMAL
SODIUM SERPL-SCNC: 141 MMOL/L (ref 136–145)
WBC # BLD AUTO: 6.9 K/UL (ref 4.3–11.1)

## 2018-09-21 PROCEDURE — 74011000250 HC RX REV CODE- 250: Performed by: INTERNAL MEDICINE

## 2018-09-21 PROCEDURE — 90471 IMMUNIZATION ADMIN: CPT

## 2018-09-21 PROCEDURE — 36415 COLL VENOUS BLD VENIPUNCTURE: CPT

## 2018-09-21 PROCEDURE — 74011250636 HC RX REV CODE- 250/636: Performed by: INTERNAL MEDICINE

## 2018-09-21 PROCEDURE — 74011250637 HC RX REV CODE- 250/637: Performed by: INTERNAL MEDICINE

## 2018-09-21 PROCEDURE — 90686 IIV4 VACC NO PRSV 0.5 ML IM: CPT | Performed by: INTERNAL MEDICINE

## 2018-09-21 PROCEDURE — 80048 BASIC METABOLIC PNL TOTAL CA: CPT

## 2018-09-21 PROCEDURE — 85025 COMPLETE CBC W/AUTO DIFF WBC: CPT

## 2018-09-21 RX ORDER — CEFPODOXIME PROXETIL 200 MG/1
200 TABLET, FILM COATED ORAL EVERY 12 HOURS
Qty: 18 TAB | Refills: 0 | Status: SHIPPED
Start: 2018-09-21 | End: 2018-09-30

## 2018-09-21 RX ORDER — LORAZEPAM 1 MG/1
1 TABLET ORAL
Qty: 28 TAB | Refills: 0 | Status: SHIPPED | OUTPATIENT
Start: 2018-09-21

## 2018-09-21 RX ORDER — DOCUSATE SODIUM 100 MG/1
100 CAPSULE, LIQUID FILLED ORAL 2 TIMES DAILY
Qty: 60 CAP | Refills: 2 | Status: SHIPPED
Start: 2018-09-21 | End: 2018-12-20

## 2018-09-21 RX ORDER — FACIAL-BODY WIPES
10 EACH TOPICAL
Qty: 30 SUPPOSITORY | Refills: 0 | Status: SHIPPED
Start: 2018-09-21

## 2018-09-21 RX ORDER — FAMOTIDINE 20 MG/1
20 TABLET, FILM COATED ORAL DAILY
Status: DISCONTINUED | OUTPATIENT
Start: 2018-09-22 | End: 2018-09-21 | Stop reason: HOSPADM

## 2018-09-21 RX ORDER — OXYCODONE HYDROCHLORIDE 5 MG/1
5 TABLET ORAL
Qty: 28 TAB | Refills: 0 | Status: SHIPPED | OUTPATIENT
Start: 2018-09-21 | End: 2019-04-01

## 2018-09-21 RX ORDER — ACETAMINOPHEN 325 MG/1
650 TABLET ORAL
Qty: 120 TAB | Refills: 0 | Status: SHIPPED
Start: 2018-09-21

## 2018-09-21 RX ORDER — ASPIRIN 81 MG/1
81 TABLET ORAL DAILY
Qty: 30 TAB | Refills: 0 | Status: SHIPPED
Start: 2018-09-21

## 2018-09-21 RX ADMIN — Medication 5 ML: at 06:15

## 2018-09-21 RX ADMIN — INFLUENZA VIRUS VACCINE 0.5 ML: 15; 15; 15; 15 SUSPENSION INTRAMUSCULAR at 10:16

## 2018-09-21 RX ADMIN — CARBIDOPA AND LEVODOPA 1 TABLET: 25; 250 TABLET ORAL at 09:01

## 2018-09-21 RX ADMIN — CEFPODOXIME PROXETIL 200 MG: 200 TABLET, FILM COATED ORAL at 08:53

## 2018-09-21 RX ADMIN — QUETIAPINE FUMARATE 12.5 MG: 25 TABLET, FILM COATED ORAL at 08:53

## 2018-09-21 RX ADMIN — GABAPENTIN 100 MG: 100 CAPSULE ORAL at 08:53

## 2018-09-21 RX ADMIN — METOPROLOL SUCCINATE 25 MG: 25 TABLET, EXTENDED RELEASE ORAL at 08:53

## 2018-09-21 RX ADMIN — Medication 1 AMPULE: at 08:53

## 2018-09-21 RX ADMIN — FAMOTIDINE 20 MG: 10 INJECTION, SOLUTION INTRAVENOUS at 08:53

## 2018-09-21 RX ADMIN — CARBIDOPA AND LEVODOPA 1 TABLET: 25; 250 TABLET ORAL at 06:30

## 2018-09-21 NOTE — PROGRESS NOTES
DIANNE spoke with doctor who stated he would like to discharge patient today to [facility]. SW called liasion to get bed information. Spouse has been notified. Dr. Caty Easton with him about DNR at this time he has not made a decision. DIANNE will continue   To assist with discharge planning. Care Management Interventions PCP Verified by CM: Yes Mode of Transport at Discharge: BLS Transition of Care Consult (CM Consult): SNF Partner SNF: Yes Discharge Durable Medical Equipment: No 
Speech Therapy Consult: No 
Current Support Network: Lives with Spouse, Family Lives Cope Plan discussed with Pt/Family/Caregiver: Yes Freedom of Choice Offered: Yes Discharge Location Discharge Placement: Home

## 2018-09-21 NOTE — PROGRESS NOTES
END OF SHIFT NOTE: 
 
Intake/Output 
09/20 1901 - 09/21 0700 In: 50 [I.V.:48] Out: 1200 [Urine:1200] Voiding: YES Catheter: NO 
Drain:  
External Female Catheter 09/18/18 (Active) Site Assessment Clean, dry, & intact 9/21/2018  1:00 AM  
Repositioned Yes 9/21/2018  1:00 AM  
Perineal Care Yes 9/21/2018  1:00 AM  
Wick Changed Yes 9/21/2018  1:00 AM  
Suction Canister/Tubing Changed Yes 9/21/2018  1:00 AM  
Urine Output (mL) 1200 ml 9/21/2018  1:02 AM  
 
 
 
 
 
 
Stool:  1 occurrences. Stool Assessment Stool Color: Judith Margaret (09/21/18 0102) Stool Appearance: Formed (09/21/18 0102) Stool Amount: Small (09/21/18 0102) Stool Source/Status: Rectum (09/21/18 0102) Emesis:  0 occurrences. VITAL SIGNS Patient Vitals for the past 12 hrs: 
 Temp Pulse Resp BP SpO2  
09/21/18 0444 98.5 °F (36.9 °C) 70 15 149/82 99 % 09/21/18 0102 97.8 °F (36.6 °C) 66 14 166/69 95 % 09/20/18 2058 97.9 °F (36.6 °C) 68 16 161/87 97 % 09/20/18 1853 98 °F (36.7 °C) 76 16 147/90 99 % Pain Assessment Pain 1 Pain Scale 1: Numeric (0 - 10) (09/21/18 0100) Pain Intensity 1: 0 (09/21/18 0100) Patient Stated Pain Goal: 0 (09/20/18 1553) Pain Reassessment 1: Patient sleeping (09/20/18 1553) Pain Location 1: Buttocks (09/20/18 0848) Pain Orientation 1: Posterior (09/20/18 0848) Pain Description 1: Aching (09/20/18 1521) Pain Intervention(s) 1: Medication (see MAR) (09/20/18 1521) Ambulating No 
 
Additional Information:  
Pt had more confusion this shift and states she is ready to go Wound dressing completed 2x. Pt denies pain Morphine and ativan [id number]. See MAR. Plans to D/C today and  wishes to be notified No needs voiced at this time. Shift report given to oncoming nurse at the bedside. Rodolfo Kim

## 2018-09-21 NOTE — DISCHARGE SUMMARY
Hospitalist Discharge Summary     Patient ID:  Shanthi Amado  [id number]  25 y.o.  1950  Admit date: 9/16/2018  7:28 PM  Discharge date and time: 9/21/2018  Attending: Flower Canales, *  PCP:  Gissell Arthur MD  Treatment Team: Attending Provider: Flower Canales MD; Consulting Provider: Chloe Mayfield MD; Consulting Provider: Armando Castellanos MD; Student Nurse: Sofia Landeros; Utilization Review: Jesse Ramos RN    Principal Diagnosis Pressure ulcer of sacral region, stage 4 [facility]   Hospital Problems as of 9/21/2018  Date Reviewed: 11/6/2017          Codes Class Noted - Resolved POA    Parkinson's disease dementia ([facility] ICD-10-CM: Mary Eldridge, F02.80  ICD-9-CM: 332.0, 294.10  9/21/2018 - Present Yes        Severe protein-calorie malnutrition ([facility] ICD-10-CM: E43  ICD-9-CM: 262  9/18/2018 - Present Yes        Debility ICD-10-CM: R53.81  ICD-9-CM: 799.3  9/17/2018 - Present Yes        Metabolic encephalopathy Z-09-NC: G93.41  ICD-9-CM: 348.31  9/16/2018 - Present Yes        Sepsis ([facility] ICD-10-CM: A41.9  ICD-9-CM: 038.9, 995.91  9/16/2018 - Present Yes        * (Principal)Pressure ulcer of sacral region, stage 4 ([facility] ICD-10-CM: K36.834  ICD-9-CM: 707.03, 707.24  9/16/2018 - Present Yes        Parkinson disease ([facility] ICD-10-CM: G20  ICD-9-CM: 332.0  8/15/2016 - Present Yes                 HPI:  'This is a 79-year-old female patient who has a very well known history of Parkinson disease, depression and atrial fibrillation on anticoagulation with Coumadin. She was brought into the emergency room this evening because according to her friends, since yesterday she has been more confused. Her Parkinson's disease has gotten worse with extremely severe tremors and she seemed to be dehydrated. The amount of information that the patient can provide is limited. This is the same case with her friends and neighbors.   She lives at home with her wheelchair bound , and apparently he also has a stone, which is not involved in her care. Her friends have severe concerns about proper medication administration, diet and general care. She has home health services and apparently she also has home hospice services, but even with that apparently her care has been suboptimal.  When she arrived into the emergency room, she was severely dehydrated. Her blood pressure was 134/62. Her heart rate was 145. Her O2 saturation was 95% and her respiratory rate was 20. She was having severe and violent tremors in her upper extremities every time she tried to do any movement. Her lungs were clear to auscultation. Her abdomen was soft and nontender, and she was found to have a deep sacral ulcer with purulent secretion. Her initial blood workup reported a white blood cell count of 14,000, stable hemoglobin, normal platelets. INR of 4.7. Normal electrolytes, normal creatinine level, normal liver function panel. A chest x-ray was done and showed no evidence of acute pulmonary disease. Urinary dipstick was done and it was not suggestive of UTI. The patient received IV ceftriaxone in the emergency room and she was presented to the hospitalist team to be admitted.'    Hospital Course:  1. Stage 4 sacral wound ulcer- she was started on IV cefepime and vancomycin. Wound culture grew MSSA and she was converted oral vantin. General surgery consulted and decided against debridement as the wound started to granulate with aggressive wound care. 2. Sepsis- secondary to above. Resolved with abx and IVF. 3. Advanced Parkinson's with dementia- she was continued on her home dose sinemet. Neurology consulted and recommended no change. She had some intermittent hallucinations and was treated with home dose seroquel, which may need to be increased over time. She has very severe tremors at times. 4. History of a fib- warfarin stopped due to fall risk.   This was discussed with Mr. Menendezlisa Gila and he was agreeable and actually under the impression that she was no longer taking coumadin at home. She will be started on 81 mg aspirin. She worked with PT and deemed candidate for short term rehab. There are concerns about her living conditions as her  is wheelchair bound and cannot tend to her very well, so she may need long-term placement. She will benefit from hospice (either at home or in long-term care) after her short term rehab. Significant Diagnostic Studies:       Labs: Results:       Chemistry Recent Labs      09/21/18 0442 09/20/18 0410 09/19/18   0430   GLU  102*  81  115*   NA  141  143  141   K  3.9  3.9  4.0   CL  102  107  104   CO2  31  30  32   BUN  9  8  11   CREA  0.47*  0.38*  0.49*   CA  8.7  8.1*  8.1*   AGAP  8  6*  5*      CBC w/Diff Recent Labs      09/21/18 0442 09/20/18 0410 09/19/18 0430   WBC  6.9  5.8  5.5   RBC  4.11  3.73*  3.47*   HGB  12.1  10.9*  10.2*   HCT  38.6  35.3*  32.8*   PLT  390  335  323   GRANS  58  53  56   LYMPH  33  35  31   EOS  0*  0*  0*      Cardiac Enzymes No results for input(s): CPK, CKND1, DARÍO in the last 72 hours. No lab exists for component: CKRMB, TROIP   Coagulation Recent Labs      09/20/18 0410 09/19/18 0430   PTP  14.9*  15.4*   INR  1.2  1.3       Lipid Panel No results found for: CHOL, CHOLPOCT, CHOLX, CHLST, CHOLV, 780746, HDL, LDL, LDLC, DLDLP, 571028, VLDLC, VLDL, TGLX, TRIGL, TRIGP, TGLPOCT, CHHD, CHHDX   BNP No results for input(s): BNPP in the last 72 hours. Liver Enzymes No results for input(s): TP, ALB, TBIL, AP, SGOT, GPT in the last 72 hours.     No lab exists for component: DBIL   Thyroid Studies Lab Results   Component Value Date/Time    TSH 0.287 (L) 09/17/2018 04:31 AM            Discharge Exam:  Visit Vitals    /83 (BP 1 Location: Right arm, BP Patient Position: At rest)    Pulse 70    Temp 98 °F (36.7 °C)    Resp 16    Ht 5' (1.524 m)    Wt 49.4 kg (108 lb 14.4 oz)    LMP  (LMP Unknown)    SpO2 99%    Breastfeeding No    BMI 21.27 kg/m2     General appearance: alert, cooperative, no distress, chronically ill and thin appearing  Lungs: clear to auscultation bilaterally  Heart: regular rate and rhythm, S1, S2 normal, no murmur, click, rub or gallop  Abdomen: soft, non-tender. Bowel sounds normal. No masses,  no organomegaly  Extremities: no cyanosis or edema  Neurologic: Slight hand tremors noted    Disposition: SNF  Discharge Condition: stable  Patient Instructions:   Current Discharge Medication List      START taking these medications    Details   acetaminophen (TYLENOL) 325 mg tablet Take 2 Tabs by mouth every six (6) hours as needed. Qty: 120 Tab, Refills: 0      bisacodyl (DULCOLAX) 10 mg suppository Insert 10 mg into rectum daily as needed. Qty: 30 Suppository, Refills: 0      cefpodoxime (VANTIN) 200 mg tablet Take 1 Tab by mouth every twelve (12) hours for 9 days. Indications: MSSA Skin ulcer infection  Qty: 18 Tab, Refills: 0      oxyCODONE IR (ROXICODONE) 5 mg immediate release tablet Take 1 Tab by mouth every six (6) hours as needed. Max Daily Amount: 20 mg. Indications: Pain  Qty: 28 Tab, Refills: 0    Associated Diagnoses: Decubitus ulcer of sacral region, unspecified ulcer stage      LORazepam (ATIVAN) 1 mg tablet Take 1 Tab by mouth every six (6) hours as needed for Anxiety. Max Daily Amount: 4 mg. Indications: anxiety  Qty: 28 Tab, Refills: 0    Associated Diagnoses: Parkinson disease ([facility] Dementia due to Parkinson's disease with behavioral disturbance (HCC)      docusate sodium (COLACE) 100 mg capsule Take 1 Cap by mouth two (2) times a day for 90 days. Qty: 60 Cap, Refills: 2      aspirin delayed-release 81 mg tablet Take 1 Tab by mouth daily.  Indications: Cerebral Thromboembolism Prevention  Qty: 30 Tab, Refills: 0         CONTINUE these medications which have NOT CHANGED    Details   carbidopa-levodopa (SINEMET-25/250)  mg per tablet Take 1 Tab by mouth four (4) times daily.  Qty: 360 Tab, Refills: 3    Associated Diagnoses: Parkinson disease (HCC)      carbidopa-levodopa ER (SINEMET CR)  mg per tablet 1 tab Qhs  Qty: 90 Tab, Refills: 3    Associated Diagnoses: Parkinson disease (HCC)      omega-3 fatty acids-vitamin e (FISH OIL) 1,000 mg cap Take 1 Cap by mouth.      lovastatin (MEVACOR) 40 mg tablet Take 40 mg by mouth nightly. metoprolol succinate (TOPROL-XL) 25 mg XL tablet Take  by mouth two (2) times a day. QUEtiapine (SEROQUEL) 25 mg tablet TAKE 1/2 TABS BY MOUTH TWO (2) TIMES A DAY. Qty: 30 Tab, Refills: 4    Associated Diagnoses: Parkinson disease ([address]      gabapentin (NEURONTIN) 100 mg capsule Take 1 Cap by mouth three (3) times daily. Qty: 60 Cap, Refills: 0      melatonin 3 mg tablet Take 1 Tab by mouth nightly. Qty: 30 Tab, Refills: 0    Associated Diagnoses: RBD (REM behavioral disorder)      Venlafaxine 150 mg tr24 Take  by mouth. STOP taking these medications       POTASSIUM CHLORIDE (KLOR-CON M20 PO) Comments:   Reason for Stopping:         triamcinolone acetonide (KENALOG) 0.1 % ointment Comments:   Reason for Stopping:         warfarin (COUMADIN) 2.5 mg tablet Comments:   Reason for Stopping:               Activity: PT/OT Eval and Treat  Diet: Mechanical soft with Ensure Enlive with meals  Wound Care: WOUND CARE INSTRUCTIONS FOR STAGE 4 SACRAL PRESSURE ULCER  Level 2 Air mattress. DAILY and PRN Wet-->dry gauze sacral wound packing (moistened with 0.125% Dakins solution-- dampen gauze with Dakins and squeeze out excess). Cover with ABD pad and tape. Change dressing qday and PRN if soiled. Code status:  DNR (form will need to be completed when  available) per discussion with Mr. Amy Roche. Discharge plan discussed over the phone with Thad Guerrier, her . Follow-up      Follow-up Appointments   Procedures    FOLLOW UP VISIT Appointment in: Other (Christy Echavarria) 1.  Dr. Osuna Rockwood (General surgery) in 2 weeks for wound recheck. 2. Dr. Gregg Card (Neurology) in 2 weeks for Parkinson's recheck. 1. Dr. Ruchi Valles (General surgery) in 2 weeks for wound recheck. 2. Dr. Gregg Card (Neurology) in 2 weeks for Parkinson's recheck. Standing Status:   Standing     Number of Occurrences:   1     Order Specific Question:   Appointment in     Answer: Other (Specify)   ·   ·   Time spent to discharge patient 35 minutes  Signed:  Irina Anthony.  Meka Ramires MD  9/21/2018  8:37 AM

## 2018-09-24 ENCOUNTER — PATIENT OUTREACH (OUTPATIENT)
Dept: CASE MANAGEMENT | Age: 68
End: 2018-09-24

## 2018-10-06 PROBLEM — Z91.199 NO-SHOW FOR APPOINTMENT: Status: ACTIVE | Noted: 2018-10-06

## 2018-10-18 ENCOUNTER — PATIENT OUTREACH (OUTPATIENT)
Dept: CASE MANAGEMENT | Age: 68
End: 2018-10-18

## 2018-10-18 PROBLEM — D68.9 COAGULOPATHY (HCC): Status: ACTIVE | Noted: 2018-10-18

## 2018-10-18 NOTE — PROGRESS NOTES
Care Coordinator called home # [phone], no answer, voicemail box full unable to leave voicemail message, Care Coordinator will  plan follow up call within 24 hours if no call is received. This note will not be viewable in [address]

## 2018-10-19 ENCOUNTER — PATIENT OUTREACH (OUTPATIENT)
Dept: CASE MANAGEMENT | Age: 68
End: 2018-10-19

## 2018-10-19 NOTE — PROGRESS NOTES
Care Coordinator called home # [phone], no answer, voicemail box not set up, unable to leave voicemail message. Care Coordinator will close encounter due to Unable to Reach patient for Follow Up Call Care Coordinator will reopen encounter if or when patient returns call. This note will not be viewable in [address]

## 2018-10-25 PROBLEM — G90.3 NEUROLOGIC ORTHOSTATIC HYPOTENSION (HCC): Status: ACTIVE | Noted: 2018-10-25

## 2018-10-25 PROBLEM — G20 PSYCHOSIS DUE TO PARKINSON'S DISEASE (HCC): Status: ACTIVE | Noted: 2018-10-25

## 2018-10-25 PROBLEM — R49.8 HYPOPHONIA: Status: ACTIVE | Noted: 2018-10-25

## 2018-11-19 PROBLEM — L89.154 PRESSURE INJURY OF SACRAL REGION, STAGE 4 (HCC): Status: ACTIVE | Noted: 2018-11-19

## 2018-11-30 ENCOUNTER — APPOINTMENT (OUTPATIENT)
Dept: GENERAL RADIOLOGY | Age: 68
End: 2018-11-30
Attending: EMERGENCY MEDICINE
Payer: MEDICARE

## 2018-11-30 ENCOUNTER — HOSPITAL ENCOUNTER (OUTPATIENT)
Age: 68
Setting detail: OBSERVATION
Discharge: HOME | End: 2018-12-01
Attending: EMERGENCY MEDICINE | Admitting: INTERNAL MEDICINE
Payer: MEDICARE

## 2018-11-30 DIAGNOSIS — G20 PARKINSON'S DISEASE (HCC): ICD-10-CM

## 2018-11-30 DIAGNOSIS — A41.9 SEPSIS, DUE TO UNSPECIFIED ORGANISM: Primary | ICD-10-CM

## 2018-11-30 PROBLEM — R45.1 AGITATION: Status: ACTIVE | Noted: 2018-11-30

## 2018-11-30 LAB
ALBUMIN SERPL-MCNC: 3.4 G/DL (ref 3.2–4.6)
ALBUMIN/GLOB SERPL: 1 {RATIO} (ref 1.2–3.5)
ALP SERPL-CCNC: 85 U/L (ref 50–136)
ALT SERPL-CCNC: 21 U/L (ref 12–65)
ANION GAP SERPL CALC-SCNC: 6 MMOL/L (ref 7–16)
APPEARANCE UR: CLEAR
AST SERPL-CCNC: 20 U/L (ref 15–37)
ATRIAL RATE: 202 BPM
BACTERIA URNS QL MICRO: 0 /HPF
BASOPHILS # BLD: 0 K/UL (ref 0–0.2)
BASOPHILS NFR BLD: 1 % (ref 0–2)
BILIRUB SERPL-MCNC: 0.3 MG/DL (ref 0.2–1.1)
BILIRUB UR QL: ABNORMAL
BUN SERPL-MCNC: 22 MG/DL (ref 8–23)
CALCIUM SERPL-MCNC: 9 MG/DL (ref 8.3–10.4)
CALCULATED R AXIS, ECG10: 63 DEGREES
CALCULATED T AXIS, ECG11: 78 DEGREES
CASTS URNS QL MICRO: ABNORMAL /LPF
CHLORIDE SERPL-SCNC: 106 MMOL/L (ref 98–107)
CO2 SERPL-SCNC: 30 MMOL/L (ref 21–32)
COLOR UR: YELLOW
CREAT SERPL-MCNC: 0.65 MG/DL (ref 0.6–1)
DIAGNOSIS, 93000: NORMAL
DIFFERENTIAL METHOD BLD: ABNORMAL
EOSINOPHIL # BLD: 0 K/UL (ref 0–0.8)
EOSINOPHIL NFR BLD: 0 % (ref 0.5–7.8)
EPI CELLS #/AREA URNS HPF: ABNORMAL /HPF
ERYTHROCYTE [DISTWIDTH] IN BLOOD BY AUTOMATED COUNT: 14 % (ref 11.9–14.6)
FLUAV AG NPH QL IA: NEGATIVE
FLUBV AG NPH QL IA: NEGATIVE
GLOBULIN SER CALC-MCNC: 3.4 G/DL (ref 2.3–3.5)
GLUCOSE SERPL-MCNC: 75 MG/DL (ref 65–100)
GLUCOSE UR STRIP.AUTO-MCNC: NEGATIVE MG/DL
HCT VFR BLD AUTO: 40.9 % (ref 35.8–46.3)
HGB BLD-MCNC: 12.9 G/DL (ref 11.7–15.4)
HGB UR QL STRIP: ABNORMAL
IMM GRANULOCYTES # BLD: 0 K/UL (ref 0–0.5)
IMM GRANULOCYTES NFR BLD AUTO: 0 % (ref 0–5)
KETONES UR QL STRIP.AUTO: ABNORMAL MG/DL
LEUKOCYTE ESTERASE UR QL STRIP.AUTO: NEGATIVE
LYMPHOCYTES # BLD: 2.6 K/UL (ref 0.5–4.6)
LYMPHOCYTES NFR BLD: 30 % (ref 13–44)
MCH RBC QN AUTO: 28.7 PG (ref 26.1–32.9)
MCHC RBC AUTO-ENTMCNC: 31.5 G/DL (ref 31.4–35)
MCV RBC AUTO: 91.1 FL (ref 79.6–97.8)
MONOCYTES # BLD: 0.9 K/UL (ref 0.1–1.3)
MONOCYTES NFR BLD: 11 % (ref 4–12)
NEUTS SEG # BLD: 5 K/UL (ref 1.7–8.2)
NEUTS SEG NFR BLD: 58 % (ref 43–78)
NITRITE UR QL STRIP.AUTO: NEGATIVE
NRBC # BLD: 0 K/UL (ref 0–0.2)
PH UR STRIP: 5.5 [PH] (ref 5–9)
PLATELET # BLD AUTO: 295 K/UL (ref 150–450)
PMV BLD AUTO: 9.9 FL (ref 9.4–12.3)
POTASSIUM SERPL-SCNC: 4.6 MMOL/L (ref 3.5–5.1)
PROT SERPL-MCNC: 6.8 G/DL (ref 6.3–8.2)
PROT UR STRIP-MCNC: NEGATIVE MG/DL
Q-T INTERVAL, ECG07: 332 MS
QRS DURATION, ECG06: 76 MS
QTC CALCULATION (BEZET), ECG08: 410 MS
RBC # BLD AUTO: 4.49 M/UL (ref 4.05–5.2)
RBC #/AREA URNS HPF: ABNORMAL /HPF
SODIUM SERPL-SCNC: 142 MMOL/L (ref 136–145)
SP GR UR REFRACTOMETRY: 1.03 (ref 1–1.02)
SPECIMEN SOURCE: NORMAL
TROPONIN I BLD-MCNC: 0 NG/ML (ref 0.02–0.05)
UROBILINOGEN UR QL STRIP.AUTO: 1 EU/DL (ref 0.2–1)
VENTRICULAR RATE, ECG03: 92 BPM
WBC # BLD AUTO: 8.6 K/UL (ref 4.3–11.1)
WBC URNS QL MICRO: ABNORMAL /HPF

## 2018-11-30 PROCEDURE — 87086 URINE CULTURE/COLONY COUNT: CPT

## 2018-11-30 PROCEDURE — 99285 EMERGENCY DEPT VISIT HI MDM: CPT | Performed by: EMERGENCY MEDICINE

## 2018-11-30 PROCEDURE — 87077 CULTURE AEROBIC IDENTIFY: CPT

## 2018-11-30 PROCEDURE — 74011000258 HC RX REV CODE- 258: Performed by: EMERGENCY MEDICINE

## 2018-11-30 PROCEDURE — 96375 TX/PRO/DX INJ NEW DRUG ADDON: CPT | Performed by: EMERGENCY MEDICINE

## 2018-11-30 PROCEDURE — 77030011943: Performed by: EMERGENCY MEDICINE

## 2018-11-30 PROCEDURE — 74011250636 HC RX REV CODE- 250/636: Performed by: PHYSICIAN ASSISTANT

## 2018-11-30 PROCEDURE — 93005 ELECTROCARDIOGRAM TRACING: CPT | Performed by: EMERGENCY MEDICINE

## 2018-11-30 PROCEDURE — 87804 INFLUENZA ASSAY W/OPTIC: CPT

## 2018-11-30 PROCEDURE — 96372 THER/PROPH/DIAG INJ SC/IM: CPT

## 2018-11-30 PROCEDURE — 87186 SC STD MICRODIL/AGAR DIL: CPT

## 2018-11-30 PROCEDURE — 96365 THER/PROPH/DIAG IV INF INIT: CPT | Performed by: EMERGENCY MEDICINE

## 2018-11-30 PROCEDURE — 81003 URINALYSIS AUTO W/O SCOPE: CPT | Performed by: EMERGENCY MEDICINE

## 2018-11-30 PROCEDURE — 85025 COMPLETE CBC W/AUTO DIFF WBC: CPT

## 2018-11-30 PROCEDURE — 74011250636 HC RX REV CODE- 250/636: Performed by: EMERGENCY MEDICINE

## 2018-11-30 PROCEDURE — 81001 URINALYSIS AUTO W/SCOPE: CPT

## 2018-11-30 PROCEDURE — 84484 ASSAY OF TROPONIN QUANT: CPT

## 2018-11-30 PROCEDURE — 74011250637 HC RX REV CODE- 250/637: Performed by: PHYSICIAN ASSISTANT

## 2018-11-30 PROCEDURE — 71045 X-RAY EXAM CHEST 1 VIEW: CPT

## 2018-11-30 PROCEDURE — 87040 BLOOD CULTURE FOR BACTERIA: CPT

## 2018-11-30 PROCEDURE — 99218 HC RM OBSERVATION: CPT

## 2018-11-30 PROCEDURE — 51701 INSERT BLADDER CATHETER: CPT | Performed by: EMERGENCY MEDICINE

## 2018-11-30 PROCEDURE — 87070 CULTURE OTHR SPECIMN AEROBIC: CPT

## 2018-11-30 PROCEDURE — 80053 COMPREHEN METABOLIC PANEL: CPT

## 2018-11-30 RX ORDER — HEPARIN SODIUM 5000 [USP'U]/ML
5000 INJECTION, SOLUTION INTRAVENOUS; SUBCUTANEOUS EVERY 8 HOURS
Status: DISCONTINUED | OUTPATIENT
Start: 2018-11-30 | End: 2018-12-01 | Stop reason: HOSPADM

## 2018-11-30 RX ORDER — LORAZEPAM 1 MG/1
1 TABLET ORAL
Status: DISCONTINUED | OUTPATIENT
Start: 2018-11-30 | End: 2018-12-01 | Stop reason: HOSPADM

## 2018-11-30 RX ORDER — SODIUM CHLORIDE 0.9 % (FLUSH) 0.9 %
5-10 SYRINGE (ML) INJECTION EVERY 8 HOURS
Status: DISCONTINUED | OUTPATIENT
Start: 2018-11-30 | End: 2018-12-01 | Stop reason: HOSPADM

## 2018-11-30 RX ORDER — ASPIRIN 81 MG/1
81 TABLET ORAL DAILY
Status: DISCONTINUED | OUTPATIENT
Start: 2018-12-01 | End: 2018-12-01 | Stop reason: HOSPADM

## 2018-11-30 RX ORDER — ACETAMINOPHEN 325 MG/1
650 TABLET ORAL
Status: DISCONTINUED | OUTPATIENT
Start: 2018-11-30 | End: 2018-12-01 | Stop reason: HOSPADM

## 2018-11-30 RX ORDER — OXYCODONE HYDROCHLORIDE 5 MG/1
5 TABLET ORAL
Status: DISCONTINUED | OUTPATIENT
Start: 2018-11-30 | End: 2018-12-01 | Stop reason: HOSPADM

## 2018-11-30 RX ORDER — LORAZEPAM 2 MG/ML
1 INJECTION INTRAMUSCULAR
Status: COMPLETED | OUTPATIENT
Start: 2018-11-30 | End: 2018-11-30

## 2018-11-30 RX ORDER — FACIAL-BODY WIPES
10 EACH TOPICAL
Status: DISCONTINUED | OUTPATIENT
Start: 2018-11-30 | End: 2018-12-01 | Stop reason: HOSPADM

## 2018-11-30 RX ORDER — METOPROLOL SUCCINATE 25 MG/1
25 TABLET, EXTENDED RELEASE ORAL 2 TIMES DAILY
Status: DISCONTINUED | OUTPATIENT
Start: 2018-11-30 | End: 2018-12-01 | Stop reason: HOSPADM

## 2018-11-30 RX ORDER — SODIUM CHLORIDE 0.9 % (FLUSH) 0.9 %
5-10 SYRINGE (ML) INJECTION AS NEEDED
Status: DISCONTINUED | OUTPATIENT
Start: 2018-11-30 | End: 2018-12-01 | Stop reason: HOSPADM

## 2018-11-30 RX ORDER — DOCUSATE SODIUM 100 MG/1
100 CAPSULE, LIQUID FILLED ORAL 2 TIMES DAILY
Status: DISCONTINUED | OUTPATIENT
Start: 2018-11-30 | End: 2018-12-01 | Stop reason: HOSPADM

## 2018-11-30 RX ORDER — CARBIDOPA AND LEVODOPA 25; 250 MG/1; MG/1
1 TABLET ORAL 4 TIMES DAILY
Status: DISCONTINUED | OUTPATIENT
Start: 2018-11-30 | End: 2018-12-01 | Stop reason: HOSPADM

## 2018-11-30 RX ORDER — SODIUM CHLORIDE 9 MG/ML
125 INJECTION, SOLUTION INTRAVENOUS ONCE
Status: DISPENSED | OUTPATIENT
Start: 2018-11-30 | End: 2018-12-01

## 2018-11-30 RX ORDER — SIMVASTATIN 20 MG/1
20 TABLET, FILM COATED ORAL
Status: DISCONTINUED | OUTPATIENT
Start: 2018-11-30 | End: 2018-12-01 | Stop reason: HOSPADM

## 2018-11-30 RX ORDER — QUETIAPINE FUMARATE 25 MG/1
12.5 TABLET, FILM COATED ORAL 2 TIMES DAILY
Status: DISCONTINUED | OUTPATIENT
Start: 2018-11-30 | End: 2018-12-01 | Stop reason: HOSPADM

## 2018-11-30 RX ORDER — CARBIDOPA AND LEVODOPA 25; 100 MG/1; MG/1
2 TABLET, EXTENDED RELEASE ORAL
Status: DISCONTINUED | OUTPATIENT
Start: 2018-11-30 | End: 2018-12-01 | Stop reason: HOSPADM

## 2018-11-30 RX ORDER — GABAPENTIN 100 MG/1
100 CAPSULE ORAL 3 TIMES DAILY
Status: DISCONTINUED | OUTPATIENT
Start: 2018-11-30 | End: 2018-12-01 | Stop reason: HOSPADM

## 2018-11-30 RX ORDER — SODIUM CHLORIDE 9 MG/ML
500 INJECTION, SOLUTION INTRAVENOUS ONCE
Status: COMPLETED | OUTPATIENT
Start: 2018-11-30 | End: 2018-12-01

## 2018-11-30 RX ADMIN — METOPROLOL SUCCINATE 25 MG: 25 TABLET, EXTENDED RELEASE ORAL at 23:15

## 2018-11-30 RX ADMIN — CEFTRIAXONE SODIUM 1 G: 1 INJECTION, POWDER, FOR SOLUTION INTRAMUSCULAR; INTRAVENOUS at 16:09

## 2018-11-30 RX ADMIN — LORAZEPAM 1 MG: 1 TABLET ORAL at 23:14

## 2018-11-30 RX ADMIN — SIMVASTATIN 20 MG: 20 TABLET, FILM COATED ORAL at 23:16

## 2018-11-30 RX ADMIN — LORAZEPAM 1 MG: 2 INJECTION INTRAMUSCULAR; INTRAVENOUS at 15:23

## 2018-11-30 RX ADMIN — DOCUSATE SODIUM 100 MG: 100 CAPSULE, LIQUID FILLED ORAL at 23:15

## 2018-11-30 RX ADMIN — CARBIDOPA AND LEVODOPA 1 TABLET: 25; 250 TABLET ORAL at 21:09

## 2018-11-30 RX ADMIN — HEPARIN SODIUM 5000 UNITS: 5000 INJECTION INTRAVENOUS; SUBCUTANEOUS at 23:15

## 2018-11-30 RX ADMIN — Medication 10 ML: at 23:17

## 2018-11-30 RX ADMIN — SODIUM CHLORIDE 500 ML: 900 INJECTION, SOLUTION INTRAVENOUS at 16:10

## 2018-11-30 RX ADMIN — GABAPENTIN 100 MG: 100 CAPSULE ORAL at 23:15

## 2018-11-30 RX ADMIN — QUETIAPINE FUMARATE 12.5 MG: 25 TABLET ORAL at 23:15

## 2018-11-30 NOTE — ED NOTES
This RN at bedside to assist with IV placement. Patient not answering questions correctly at this time. Telling this RN not to Marlane Batty it to the pervert\" and she continues to say things that do not make sense. Primary RN aware.

## 2018-11-30 NOTE — H&P
Admission History and Physical 
 
Patient: Cong Gutierrez MRN: [id number]  SSN: [id number] YOB: 1950  Age: 70 y.o. Sex: female Chief Complaint Patient presents with  Shortness of Breath Subjective:  
  
Cong Gutierrez is a 70 y.o. female with a PMH of Parkinson's disease with dementia, HTN, Atrial fibrillation who presents to [facility] being sent from home by home health aid for evaluation of patient stating she felt faint and developing diaphoresis earlier today. The patient is agitated and is unable to appropriately answer questions. She apparently appeared SOB at home. Per aid, her O2 sat was 69%. EMS was called and also had hypoxia, but with poor waveform. She was sent to the ER. She has been normotensive since arrival with normal BG. Her waveform on pulse oximetry during interview is unable to be obtained due to her tremors. She appears normal in color and in no distress. She is unable to verbalize any complaints. When asked, she does not answer appropriately. She is having significant tremors. She is not diaphoretic. Past Medical History:  
Diagnosis Date  Arrhythmia 7/14/2016  Atrial fibrillation ([address] 8/24/2015  Dry eye  Heart disease 7/14/2016  High blood pressure 7/14/2016  History of basal cell cancer 7/14/2016  Hyperlipidemia 7/14/2016  Hypertension 7/14/2016  Presence of cardiac pacemaker 8/24/2015  Tremor 7/14/2016 Past Surgical History:  
Procedure Laterality Date  HX COLONOSCOPY  12/2011  HX HEART CATHETERIZATION    
 HX OTHER SURGICAL Cardiac Electrophysiology Mapping and Ablation  HX OTHER SURGICAL Lung Biopsy  HX OTHER SURGICAL  1982  
 skin cancer excision, basal cell carcinoma on forehead  HX OTHER SURGICAL  6/12 Cardiac pacemaker placement  HX PARTIAL HYSTERECTOMY Abdominal   
 HX SALPINGO-OOPHORECTOMY Laparoscopic, with MARIA ELENA  
 HX TONSILLECTOMY Family History Problem Relation Age of Onset  Heart Disease Mother  Thyroid Disease Mother  Heart Disease Father  Cancer Father Lung Cancer Social History Tobacco Use  Smoking status: Never Smoker  Smokeless tobacco: Never Used Substance Use Topics  Alcohol use: No  
  
Prior to Admission medications Medication Sig Start Date End Date Taking? Authorizing Provider  
acetaminophen (TYLENOL) 325 mg tablet Take 2 Tabs by mouth every six (6) hours as needed. 9/21/18   Guillermo Gibson MD  
bisacodyl (DULCOLAX) 10 mg suppository Insert 10 mg into rectum daily as needed. 9/21/18   Guillermo Gibson MD  
oxyCODONE IR (ROXICODONE) 5 mg immediate release tablet Take 1 Tab by mouth every six (6) hours as needed. Max Daily Amount: 20 mg. Indications: Pain 9/21/18   Guillermo Gibson MD  
LORazepam (ATIVAN) 1 mg tablet Take 1 Tab by mouth every six (6) hours as needed for Anxiety. Max Daily Amount: 4 mg. Indications: anxiety 9/21/18   Guillermo Gibson MD  
docusate sodium (COLACE) 100 mg capsule Take 1 Cap by mouth two (2) times a day for 90 days. 9/21/18 12/20/18  Guillermo Gibson MD  
aspirin delayed-release 81 mg tablet Take 1 Tab by mouth daily. Indications: Cerebral Thromboembolism Prevention 9/21/18   Guillermo Gibson MD  
QUEtiapine (SEROQUEL) 25 mg tablet TAKE 1/2 TABS BY MOUTH TWO (2) TIMES A DAY. 7/3/18   Isatu Potts NP  
gabapentin (NEURONTIN) 100 mg capsule Take 1 Cap by mouth three (3) times daily. 4/29/18   Yusuf De Leon MD  
melatonin 3 mg tablet Take 1 Tab by mouth nightly. 4/16/18   Isatu Potts NP Venlafaxine 150 mg tr24 Take  by mouth. Provider, Historical  
carbidopa-levodopa (SINEMET-25/250)  mg per tablet Take 1 Tab by mouth four (4) times daily. 2/13/18   Isatu Potts NP  
carbidopa-levodopa ER (SINEMET CR)  mg per tablet 1 tab Qhs 2/13/18   Isatu Potts NP  
omega-3 fatty acids-vitamin e (FISH OIL) 1,000 mg cap Take 1 Cap by mouth. Provider, Historical  
lovastatin (MEVACOR) 40 mg tablet Take 40 mg by mouth nightly. Provider, Historical  
metoprolol succinate (TOPROL-XL) 25 mg XL tablet Take  by mouth two (2) times a day. Provider, Historical  
  
 
Allergies Allergen Reactions  Codeine Unknown (comments)  Penicillins Unknown (comments)  Sulfa (Sulfonamide Antibiotics) Unknown (comments) Review of Systems: 
Review of systems not obtained due to patient factors. Objective:  
 
Vitals:  
 11/30/18 1431 11/30/18 1538 11/30/18 1543 11/30/18 1650 BP: (!) 158/131   157/78 Pulse: 100  100 87 Resp: 18  (!) 38 (!) 36 Temp: 98.5 °F (36.9 °C) 100.3 °F (37.9 °C) SpO2: 100%  100% 100% Weight: 47.6 kg (105 lb) Height: 5' (1.524 m) Physical Exam: 
GENERAL: alert, delirious, no distress, disoriented EYE: negative THROAT & NECK: normal and no erythema or exudates noted. LUNG: clear to auscultation bilaterally HEART: regular rate and rhythm, S1, S2 normal, no murmur, click, rub or gallop ABDOMEN: soft, non-tender. Bowel sounds normal. No masses,  no organomegaly EXTREMITIES:  extremities normal, atraumatic, no cyanosis or edema SKIN: ulcer noted on sacrum NEUROLOGIC: positive findings: delirious and rest tremor PSYCHIATRIC: agitated Data Review: 
I have reviewed all labs, meds, telemetry events, and studies from the last 24 hours: 
 
Recent Results (from the past 24 hour(s)) EKG, 12 LEAD, INITIAL Collection Time: 11/30/18  2:31 PM  
Result Value Ref Range Ventricular Rate 92 BPM  
 Atrial Rate 202 BPM  
 QRS Duration 76 ms  
 Q-T Interval 332 ms QTC Calculation (Bezet) 410 ms Calculated R Axis 63 degrees Calculated T Axis 78 degrees Diagnosis    
  !! AGE AND GENDER SPECIFIC ECG ANALYSIS !! sinus tachycardia with PAC's and baseline artifact Non-specific ST-t wave changes Abnormal ECG Confirmed by Tracy Osorio MD (), Tavares Taylor ([id number]) on 11/30/2018 5:07:29 PM 
  
 POC TROPONIN-I Collection Time: 11/30/18  3:02 PM  
Result Value Ref Range Troponin-I (POC) 0 (L) 0.02 - 0.05 ng/ml CBC WITH AUTOMATED DIFF Collection Time: 11/30/18  3:04 PM  
Result Value Ref Range WBC 8.6 4.3 - 11.1 K/uL  
 RBC 4.49 4.05 - 5.2 M/uL  
 HGB 12.9 11.7 - 15.4 g/dL HCT 40.9 35.8 - 46.3 % MCV 91.1 79.6 - 97.8 FL  
 MCH 28.7 26.1 - 32.9 PG  
 MCHC 31.5 31.4 - 35.0 g/dL  
 RDW 14.0 11.9 - 14.6 % PLATELET 156 970 - 511 K/uL MPV 9.9 9.4 - 12.3 FL ABSOLUTE NRBC 0.00 0.0 - 0.2 K/uL  
 DF AUTOMATED NEUTROPHILS 58 43 - 78 % LYMPHOCYTES 30 13 - 44 % MONOCYTES 11 4.0 - 12.0 % EOSINOPHILS 0 (L) 0.5 - 7.8 % BASOPHILS 1 0.0 - 2.0 % IMMATURE GRANULOCYTES 0 0.0 - 5.0 %  
 ABS. NEUTROPHILS 5.0 1.7 - 8.2 K/UL  
 ABS. LYMPHOCYTES 2.6 0.5 - 4.6 K/UL  
 ABS. MONOCYTES 0.9 0.1 - 1.3 K/UL  
 ABS. EOSINOPHILS 0.0 0.0 - 0.8 K/UL  
 ABS. BASOPHILS 0.0 0.0 - 0.2 K/UL  
 ABS. IMM. GRANS. 0.0 0.0 - 0.5 K/UL METABOLIC PANEL, COMPREHENSIVE Collection Time: 11/30/18  3:04 PM  
Result Value Ref Range Sodium 142 136 - 145 mmol/L Potassium 4.6 3.5 - 5.1 mmol/L Chloride 106 98 - 107 mmol/L  
 CO2 30 21 - 32 mmol/L Anion gap 6 (L) 7 - 16 mmol/L Glucose 75 65 - 100 mg/dL BUN 22 8 - 23 MG/DL Creatinine 0.65 0.6 - 1.0 MG/DL  
 GFR est AA >60 >60 ml/min/1.73m2 GFR est non-AA >60 >60 ml/min/1.73m2 Calcium 9.0 8.3 - 10.4 MG/DL Bilirubin, total 0.3 0.2 - 1.1 MG/DL  
 ALT (SGPT) 21 12 - 65 U/L  
 AST (SGOT) 20 15 - 37 U/L Alk. phosphatase 85 50 - 136 U/L Protein, total 6.8 6.3 - 8.2 g/dL Albumin 3.4 3.2 - 4.6 g/dL Globulin 3.4 2.3 - 3.5 g/dL A-G Ratio 1.0 (L) 1.2 - 3.5 URINALYSIS W/ RFLX MICROSCOPIC Collection Time: 11/30/18  3:40 PM  
Result Value Ref Range Color YELLOW Appearance CLEAR Specific gravity 1.026 (H) 1.001 - 1.023    
 pH (UA) 5.5 5.0 - 9.0 Protein NEGATIVE  NEG mg/dL Glucose NEGATIVE  mg/dL Ketone TRACE (A) NEG mg/dL Bilirubin SMALL (A) NEG Blood TRACE (A) NEG Urobilinogen 1.0 0.2 - 1.0 EU/dL Nitrites NEGATIVE  NEG Leukocyte Esterase NEGATIVE  NEG    
 WBC 0-3 0 /hpf  
 RBC 10-20 0 /hpf Epithelial cells 0-3 0 /hpf Bacteria 0 0 /hpf Casts 5-10 0 /lpf INFLUENZA A & B AG (RAPID TEST) Collection Time: 11/30/18  4:58 PM  
Result Value Ref Range Influenza A Ag NEGATIVE  NEG Influenza B Ag NEGATIVE  NEG Source NASOPHARYNGEAL All Micro Results Procedure Component Value Units Date/Time INFLUENZA A & B AG (RAPID TEST) [[id number]] Collected:  11/30/18 1658 Order Status:  Completed Specimen:  Nasopharyngeal from Nasal washing Updated:  11/30/18 1716 Influenza A Ag NEGATIVE Comment: NEGATIVE FOR THE PRESENCE OF INFLUENZA A ANTIGEN 
INFECTION DUE TO INFLUENZA A CANNOT BE RULED OUT. BECAUSE THE ANTIGEN PRESENT IN THE SAMPLE MAY BE BELOW 
THE DETECTION LIMIT OF THE TEST. A NEGATIVE TEST IS PRESUMPTIVE AND IT IS RECOMMENDED THAT THESE RESULTS BE CONFIRMED BY VIRAL CULTURE OR AN FDA-CLEARED INFLUENZA A AND B MOLECULAR ASSAY. Influenza B Ag NEGATIVE Comment: NEGATIVE FOR THE PRESENCE OF INFLUENZA B ANTIGEN 
INFECTION DUE TO INFLUENZA B CANNOT BE RULED OUT. BECAUSE THE ANTIGEN PRESENT IN THE SAMPLE MAY BE BELOW 
THE DETECTION LIMIT OF THE TEST. A NEGATIVE TEST IS PRESUMPTIVE AND IT IS RECOMMENDED THAT THESE RESULTS BE CONFIRMED BY VIRAL CULTURE OR AN FDA-CLEARED INFLUENZA A AND B MOLECULAR ASSAY. Source NASOPHARYNGEAL     
 CULTURE, [facility] STAIN [[id number]] Order Status:  Sent Specimen:  Decubitus CULTURE, BLOOD [[id number]] Collected:  11/30/18 1553 Order Status:  Completed Specimen:  Blood Updated:  11/30/18 1628 CULTURE, BLOOD [[id number]] Collected:  11/30/18 1553 Order Status:  Completed Specimen:  Blood Updated:  11/30/18 1627 CULTURE, URINE [[id number]] Order Status:  Sent Specimen:  Cath Urine No results found for this visit on 11/30/18. Current Meds: 
Current Facility-Administered Medications Medication Dose Route Frequency  cefTRIAXone (ROCEPHIN) 1 g in 0.9% sodium chloride (MBP/ADV) 50 mL  1 g IntraVENous Q24H  
 0.9% sodium chloride infusion  125 mL/hr IntraVENous ONCE Current Outpatient Medications Medication Sig  
 acetaminophen (TYLENOL) 325 mg tablet Take 2 Tabs by mouth every six (6) hours as needed.  bisacodyl (DULCOLAX) 10 mg suppository Insert 10 mg into rectum daily as needed.  oxyCODONE IR (ROXICODONE) 5 mg immediate release tablet Take 1 Tab by mouth every six (6) hours as needed. Max Daily Amount: 20 mg. Indications: Pain  LORazepam (ATIVAN) 1 mg tablet Take 1 Tab by mouth every six (6) hours as needed for Anxiety. Max Daily Amount: 4 mg. Indications: anxiety  docusate sodium (COLACE) 100 mg capsule Take 1 Cap by mouth two (2) times a day for 90 days.  aspirin delayed-release 81 mg tablet Take 1 Tab by mouth daily. Indications: Cerebral Thromboembolism Prevention  QUEtiapine (SEROQUEL) 25 mg tablet TAKE 1/2 TABS BY MOUTH TWO (2) TIMES A DAY.  gabapentin (NEURONTIN) 100 mg capsule Take 1 Cap by mouth three (3) times daily.  melatonin 3 mg tablet Take 1 Tab by mouth nightly.  Venlafaxine 150 mg tr24 Take  by mouth.  carbidopa-levodopa (SINEMET-25/250)  mg per tablet Take 1 Tab by mouth four (4) times daily.  carbidopa-levodopa ER (SINEMET CR)  mg per tablet 1 tab Qhs  
 omega-3 fatty acids-vitamin e (FISH OIL) 1,000 mg cap Take 1 Cap by mouth.  lovastatin (MEVACOR) 40 mg tablet Take 40 mg by mouth nightly.  metoprolol succinate (TOPROL-XL) 25 mg XL tablet Take  by mouth two (2) times a day. Other Studies (last 24 hours): Xr Chest [facility] Result Date: 11/30/2018 Portable AP semiupright chest dated 11/30/2018, 1445 hours Prior exam 9/16/2018 CLINICAL INFORMATION: Weakness and dizziness. Shortness of breath Pacemaker is present. Heart is normal in size and mediastinum unremarkable. Pulmonary vascularity is normal. Small calcified granuloma left midlung. No pulmonary infiltrate or pleural effusion. IMPRESSION: No acute abnormality Assessment:  
 
Hospital Problems  Date Reviewed: 11/19/2018 Codes Class Noted POA Agitation ICD-10-CM: R45.1 ICD-9-CM: 307.9  11/30/2018 Unknown * (Principal) Psychosis due to Parkinson's disease [facility]) ICD-10-CM: G20 
ICD-9-CM: 332.0  10/25/2018 Yes Pressure ulcer of sacral region, stage 4 ([facility]) ICD-10-CM: K76.928 ICD-9-CM: 707.03, 707.24  9/16/2018 Yes Parkinson disease [facility]) ICD-10-CM: G20 
ICD-9-CM: 332.0  8/15/2016 Yes Hypertension ICD-10-CM: I10 
ICD-9-CM: 401.9  7/14/2016 Yes Atrial fibrillation ([address] ICD-10-CM: I48.91 
ICD-9-CM: 427.31  8/24/2015 Yes Plan:  
 
Principal Problem: 
  Psychosis due to Parkinson's disease [facility]) - patient appears acutely agitated. Will continue her home medications, but she likely will require increased sedation overnight. She hopefully will improve upon discharge to her home, which is more familiar to her. She has no evidence of acute infection on any imaging or lab work and is not febrile. Doubt sepsis or SIRS Active Problems: 
  Atrial fibrillation ([address] (8/24/2015) - cont metoprolol. She is very difficult to examine due to her extreme tremors, and telemetry in ER is not accurate. She is rate controlled, but difficult to tell if she is in NSR. Hypertension (7/14/2016) - controlled on home medications Parkinson disease ([address] - severely debilitating in this patient. Cont Sinemet. Pressure ulcer of sacral region, stage 4 ([address] - will consult wound care. Turn and reposition q2 hours as able. Agitation (11/30/2018) - as per above DVT ppx: SC heparin Expected dispo: < 2 MN, likely can d/c back to home in AM 
D/W Dr. Zach Aldana Signed By: NIHARIKA Chase November 30, 2018

## 2018-11-30 NOTE — ED TRIAGE NOTES
Pt arrives EMS from home, seen by home health, hx of Parkinson's, home aide reported to EMS that pt was feeling faint and then suddenly became profusely diaphoretic and weak/dizzy. Appeared to have SOB. O2 saturation by aide at time of incident was 69%. EMS states o2 levels with them on their arrival was 78% but poor waveform. 83% was highest EMS got on 4L NC but still poor waveform. HR 70-80. BGL 130s. Manual /90. Home aide reports to EMS that during episode BP was 110/50. Denies nausea, dizziness. Denies LOC during episode. Pt has sacral wound, home health unable to change dressing due to concerning episode.

## 2018-11-30 NOTE — ED NOTES
Pt's  called to check on her, requests primary RN call him when disposition is decided.  states pt will need EMS transport home. Fercho Louise [phone]

## 2018-11-30 NOTE — ED PROVIDER NOTES
34-year-old white female was found to be diaphoretic with increased respirations and increased tremors. Sent for evaluation due to concern of infection. The patient has a Parkinson's disease and dementia and is a poor historian. The history is provided by the patient and the EMS personnel. Past Medical History:  
Diagnosis Date  Arrhythmia 7/14/2016  Atrial fibrillation ([address] 8/24/2015  Dry eye  Heart disease 7/14/2016  High blood pressure 7/14/2016  History of basal cell cancer 7/14/2016  Hyperlipidemia 7/14/2016  Hypertension 7/14/2016  Presence of cardiac pacemaker 8/24/2015  Tremor 7/14/2016 Past Surgical History:  
Procedure Laterality Date  HX COLONOSCOPY  12/2011  HX HEART CATHETERIZATION    
 HX OTHER SURGICAL Cardiac Electrophysiology Mapping and Ablation  HX OTHER SURGICAL Lung Biopsy  HX OTHER SURGICAL  1982  
 skin cancer excision, basal cell carcinoma on forehead  HX OTHER SURGICAL  6/12 Cardiac pacemaker placement  HX PARTIAL HYSTERECTOMY Abdominal   
 HX SALPINGO-OOPHORECTOMY Laparoscopic, with MARIA ELENA  
 HX TONSILLECTOMY Family History:  
Problem Relation Age of Onset  Heart Disease Mother  Thyroid Disease Mother  Heart Disease Father  Cancer Father Lung Cancer Social History Socioeconomic History  Marital status:  Spouse name: Not on file  Number of children: Not on file  Years of education: Not on file  Highest education level: Not on file Social Needs  Financial resource strain: Not on file  Food insecurity - worry: Not on file  Food insecurity - inability: Not on file  Transportation needs - medical: Not on file  Transportation needs - non-medical: Not on file Occupational History  Not on file Tobacco Use  Smoking status: Never Smoker  Smokeless tobacco: Never Used Substance and Sexual Activity  Alcohol use: No  
 Drug use: No  
 Sexual activity: Not on file Other Topics Concern  Not on file Social History Narrative  Not on file ALLERGIES: Codeine; Penicillins; and Sulfa (sulfonamide antibiotics) Review of Systems Unable to perform ROS: Dementia Vitals:  
 11/30/18 1431 11/30/18 1538 11/30/18 1543 11/30/18 1650 BP: (!) 158/131   157/78 Pulse: 100  100 87 Resp: 18  (!) 38 (!) 36 Temp: 98.5 °F (36.9 °C) 100.3 °F (37.9 °C) SpO2: 100%  100% 100% Weight: 47.6 kg (105 lb) Height: 5' (1.524 m) Physical Exam  
Constitutional: She appears well-developed and well-nourished. She does not appear ill. She appears distressed. Appears very anxious and is  Having some Parkinson tremor but mostly appears to be having a functional shaking of her legs and upper extremities. The leg shaking stops when patient is distracted. The right upper extremity shaking improves when she is distracted. HENT:  
Mouth/Throat: Oropharynx is clear and moist.  
Eyes: EOM are normal. Pupils are equal, round, and reactive to light. Neck: Neck supple. Cardiovascular: Regular rhythm, normal heart sounds, intact distal pulses and normal pulses. Tachycardia present. Pulses: 
     Carotid pulses are 2+ on the right side, and 2+ on the left side. Radial pulses are 2+ on the right side, and 2+ on the left side. Pulmonary/Chest: Effort normal and breath sounds normal.  
Abdominal: Soft. She exhibits no distension and no mass. There is no tenderness. There is no guarding. Musculoskeletal: Normal range of motion. She exhibits no edema. Neurological: She is alert. No sensory deficit. She exhibits normal muscle tone. Severe tremorsee constitutional  
Skin: Skin is warm and dry. 1-2 cm round sacral decubitus ulcer in the midline. No purulent drainage. No erythema. Nursing note and vitals reviewed. MDM Number of Diagnoses or Management Options Sepsis, due to unspecified organism [facility]):  
Diagnosis management comments: Patient found to have a rectal temp of 100.3. Elevated lactic acid. A shunt was cultured and with checks x-ray being negative she was given Rocephin for presumed UTI. Urine does not appear infected after urine microscopic evaluation. Wound culture obtained of sacral decubitus ulcer but it does not appear infected. Influenza test ordered. Patient to be admitted by the hospitalist service for sepsis and further evaluation. May be viral illness. Amount and/or Complexity of Data Reviewed Clinical lab tests: ordered and reviewed (Results for orders placed or performed during the hospital encounter of 11/30/18 
-CBC WITH AUTOMATED DIFF Result                      Value             Ref Range WBC                         8.6               4.3 - 11.1 K* 
     RBC                         4.49              4.05 - 5.2 M* HGB                         12.9              11.7 - 15.4 * HCT                         40.9              35.8 - 46.3 % MCV                         91.1              79.6 - 97.8 * MCH                         28.7              26.1 - 32.9 * MCHC                        31.5              31.4 - 35.0 * RDW                         14.0              11.9 - 14.6 % PLATELET                    295               150 - 450 K/* MPV                         9.9               9.4 - 12.3 FL ABSOLUTE NRBC               0.00              0.0 - 0.2 K/* DF                          AUTOMATED NEUTROPHILS                 58                43 - 78 % LYMPHOCYTES                 30                13 - 44 % MONOCYTES                   11                4.0 - 12.0 % EOSINOPHILS                 0 (L)             0.5 - 7.8 %      BASOPHILS                   1                 0.0 - 2.0 %   
 IMMATURE GRANULOCYTES       0                 0.0 - 5.0 %   
     ABS. NEUTROPHILS            5.0               1.7 - 8.2 K/* ABS. LYMPHOCYTES            2.6               0.5 - 4.6 K/* ABS. MONOCYTES              0.9               0.1 - 1.3 K/* ABS. EOSINOPHILS            0.0               0.0 - 0.8 K/* ABS. BASOPHILS              0.0               0.0 - 0.2 K/* ABS. IMM. GRANS.            0.0               0.0 - 0.5 K/* 
-METABOLIC PANEL, COMPREHENSIVE Result                      Value             Ref Range Sodium                      142               136 - 145 mm* Potassium                   4.6               3.5 - 5.1 mm* Chloride                    106               98 - 107 mmo* CO2                         30                21 - 32 mmol* Anion gap                   6 (L)             7 - 16 mmol/L Glucose                     75                65 - 100 mg/* BUN                         22                8 - 23 MG/DL Creatinine                  0.65              0.6 - 1.0 MG* 
     GFR est AA                  >60               >60 ml/min/1* GFR est non-AA              >60               >60 ml/min/1* Calcium                     9.0               8.3 - 10.4 M* Bilirubin, total            0.3               0.2 - 1.1 MG* ALT (SGPT)                  21                12 - 65 U/L   
     AST (SGOT)                  20                15 - 37 U/L Alk. phosphatase            85                50 - 136 U/L Protein, total              6.8               6.3 - 8.2 g/* Albumin                     3.4               3.2 - 4.6 g/* Globulin                    3.4               2.3 - 3.5 g/* A-G Ratio                   1.0 (L)           1.2 - 3.5     
-URINALYSIS W/ RFLX MICROSCOPIC Result                      Value             Ref Range Color                       YELLOW Appearance                  CLEAR Specific gravity            1.026 (H)         1.001 - 1.02* pH (UA)                     5.5               5.0 - 9.0 Protein                     NEGATIVE          NEG mg/dL Glucose                     NEGATIVE          mg/dL Ketone                      TRACE (A)         NEG mg/dL Bilirubin                   SMALL (A)         NEG Blood                       TRACE (A)         NEG Urobilinogen                1.0               0.2 - 1.0 EU* Nitrites                    NEGATIVE          NEG Leukocyte Esterase          NEGATIVE          NEG           
     WBC                         0-3               0 /hpf        
     RBC                         10-20             0 /hpf Epithelial cells            0-3               0 /hpf Bacteria                    0                 0 /hpf Casts                       5-10              0 /lpf        
-POC TROPONIN-I Result                      Value             Ref Range Troponin-I (POC)            0 (L)             0.02 - 0.05 * 
-EKG, 12 LEAD, INITIAL Result                      Value             Ref Range Ventricular Rate            92                BPM           
     Atrial Rate                 202               BPM           
     QRS Duration                76                ms Q-T Interval                332               ms            
     QTC Calculation (Bezet)     410               ms            
     Calculated R Axis           63                degrees Calculated T Axis           78                degrees      Diagnosis                                                   
 !! AGE AND GENDER SPECIFIC ECG ANALYSIS !! 
 sinus tachycardia with PAC's and baseline artifact Non-specific ST-t wave changes Abnormal ECG Confirmed by Lexis Cole MD (), Chandler Sharma ([id number]) on 11/30/2018 5:07:29 PM 
  
) Tests in the radiology section of CPT®: ordered and reviewed (Xr Chest [facility] Result Date: 11/30/2018 Portable AP semiupright chest dated 11/30/2018, 1445 hours Prior exam 9/16/2018 CLINICAL INFORMATION: Weakness and dizziness. Shortness of breath Pacemaker is present. Heart is normal in size and mediastinum unremarkable. Pulmonary vascularity is normal. Small calcified granuloma left midlung. No pulmonary infiltrate or pleural effusion. IMPRESSION: No acute abnormality 
 
) Review and summarize past medical records: yes Discuss the patient with other providers: yes Procedures

## 2018-12-01 VITALS
RESPIRATION RATE: 20 BRPM | SYSTOLIC BLOOD PRESSURE: 118 MMHG | OXYGEN SATURATION: 94 % | WEIGHT: 101.6 LBS | HEART RATE: 72 BPM | BODY MASS INDEX: 19.94 KG/M2 | HEIGHT: 60 IN | DIASTOLIC BLOOD PRESSURE: 68 MMHG | TEMPERATURE: 98.1 F

## 2018-12-01 PROBLEM — G20 PSYCHOSIS DUE TO PARKINSON'S DISEASE (HCC): Chronic | Status: ACTIVE | Noted: 2018-10-25

## 2018-12-01 PROBLEM — L89.154 PRESSURE ULCER OF SACRAL REGION, STAGE 4 (HCC): Chronic | Status: ACTIVE | Noted: 2018-09-16

## 2018-12-01 PROBLEM — R45.1 AGITATION: Status: RESOLVED | Noted: 2018-11-30 | Resolved: 2018-12-01

## 2018-12-01 PROCEDURE — 74011250636 HC RX REV CODE- 250/636: Performed by: PHYSICIAN ASSISTANT

## 2018-12-01 PROCEDURE — 96372 THER/PROPH/DIAG INJ SC/IM: CPT

## 2018-12-01 PROCEDURE — 99218 HC RM OBSERVATION: CPT

## 2018-12-01 PROCEDURE — 74011250637 HC RX REV CODE- 250/637: Performed by: PHYSICIAN ASSISTANT

## 2018-12-01 RX ORDER — CEPHALEXIN 500 MG/1
500 CAPSULE ORAL 2 TIMES DAILY
COMMUNITY
Start: 2018-11-24 | End: 2018-12-01

## 2018-12-01 RX ADMIN — DOCUSATE SODIUM 100 MG: 100 CAPSULE, LIQUID FILLED ORAL at 08:48

## 2018-12-01 RX ADMIN — CARBIDOPA AND LEVODOPA 1 TABLET: 25; 250 TABLET ORAL at 11:59

## 2018-12-01 RX ADMIN — GABAPENTIN 100 MG: 100 CAPSULE ORAL at 08:48

## 2018-12-01 RX ADMIN — QUETIAPINE FUMARATE 12.5 MG: 25 TABLET ORAL at 08:48

## 2018-12-01 RX ADMIN — Medication 5 ML: at 05:53

## 2018-12-01 RX ADMIN — CARBIDOPA AND LEVODOPA 1 TABLET: 25; 250 TABLET ORAL at 08:48

## 2018-12-01 RX ADMIN — METOPROLOL SUCCINATE 25 MG: 25 TABLET, EXTENDED RELEASE ORAL at 08:48

## 2018-12-01 RX ADMIN — ASPIRIN 81 MG: 81 TABLET, COATED ORAL at 08:48

## 2018-12-01 RX ADMIN — HEPARIN SODIUM 5000 UNITS: 5000 INJECTION INTRAVENOUS; SUBCUTANEOUS at 05:53

## 2018-12-01 NOTE — DISCHARGE SUMMARY
Hospitalist Discharge Summary     Admit Date:  2018  2:08 PM   Name:  Che Lazar   Age:  70 y.o.  :  1950   MRN:  [id number]   PCP:  Dagoberto Rios MD  Treatment Team: Attending Provider: Ani Craven DO; Utilization Review: Juan David Iverson RN    Problem List for this Hospitalization:  Hospital Problems as of 2018 Date Reviewed: 2018          Codes Class Noted - Resolved POA    Agitation ICD-10-CM: R45.1  ICD-9-CM: 307.9  2018 - Present Unknown        * (Principal) Psychosis due to Parkinson's disease ([facility] ICD-10-CM: Letizoila Aggarwal  ICD-9-CM: 332.0  10/25/2018 - Present Yes        Pressure ulcer of sacral region, stage 4 ([facility] ICD-10-CM: C69.828  ICD-9-CM: 707.03, 707.24  2018 - Present Yes        Parkinson disease ([facility] ICD-10-CM: Leti Manner  ICD-9-CM: 332.0  8/15/2016 - Present Yes        Hypertension ICD-10-CM: I10  ICD-9-CM: 401.9  2016 - Present Yes        Atrial fibrillation ([facility] ICD-10-CM: I48.91  ICD-9-CM: 427.31  2015 - Present Yes                Admission HPI from 2018:    Rozina Sewell is a 70 y.o. female with a PMH of Parkinson's disease with dementia, HTN, Atrial fibrillation who presents to [facility] being sent from home by home health aid for evaluation of patient stating she felt faint and developing diaphoresis earlier today. The patient is agitated and is unable to appropriately answer questions. She apparently appeared SOB at home. Per aid, her O2 sat was 69%. EMS was called and also had hypoxia, but with poor waveform. She was sent to the ER. She has been normotensive since arrival with normal BG. Her waveform on pulse oximetry during interview is unable to be obtained due to her tremors. She appears normal in color and in no distress. She is unable to verbalize any complaints. When asked, she does not answer appropriately. She is having significant tremors. She is not diaphoretic. \"    Hospital Course:  Pt was observed overnight. Vitals and labs OK. There was probably an error in measurement when EMS reportedly assessed her as hypoxic. Lung exam normal and no hypoxia here in hospital.  HR controlled. No reason to keep pt in hospital.  Discharge back home. Follow up instructions and discharge meds at bottom of this note. Plan was discussed with pt. All questions answered. Patient was stable at time of discharge. Diagnostic Imaging/Tests:   Xr Chest Port    Result Date: 11/30/2018  Portable AP semiupright chest dated 11/30/2018, 1445 hours Prior exam 9/16/2018 CLINICAL INFORMATION: Weakness and dizziness. Shortness of breath Pacemaker is present. Heart is normal in size and mediastinum unremarkable. Pulmonary vascularity is normal. Small calcified granuloma left midlung. No pulmonary infiltrate or pleural effusion. IMPRESSION: No acute abnormality      Echocardiogram results:  No results found for this visit on 11/30/18. All Micro Results     Procedure Component Value Units Date/Time    CULTURE, URINE [[id number]] Collected:  11/30/18 1540    Order Status:  Completed Specimen:  Cath Urine Updated:  12/01/18 0852     Special Requests: NO SPECIAL REQUESTS        Culture result:       NO GROWTH AFTER SHORT PERIOD OF INCUBATION. FURTHER RESULTS TO FOLLOW AFTER OVERNIGHT INCUBATION. April Carwin STAIN [[id number]] Collected:  11/30/18 1653    Order Status:  Completed Specimen:  Decubitus Updated:  12/01/18 0844     Special Requests: NO SPECIAL REQUESTS        GRAM STAIN PENDING     Culture result:       NO GROWTH AFTER SHORT PERIOD OF INCUBATION. FURTHER RESULTS TO FOLLOW AFTER OVERNIGHT INCUBATION.           CULTURE, BLOOD [[id number]] Collected:  11/30/18 1553    Order Status:  Completed Specimen:  Blood Updated:  12/01/18 0629     Special Requests: --        LEFT  FOREARM       Culture result: NO GROWTH AFTER 11 HOURS       CULTURE, BLOOD [[id number]] Collected:  11/30/18 1553    Order Status:  Completed Specimen:  Blood Updated:  12/01/18 0629     Special Requests: --        RIGHT  ARM       Culture result: NO GROWTH AFTER 11 HOURS       INFLUENZA A & B AG (RAPID TEST) [[id number]] Collected:  11/30/18 1658    Order Status:  Completed Specimen:  Nasopharyngeal from Nasal washing Updated:  11/30/18 1716     Influenza A Ag NEGATIVE         Comment: NEGATIVE FOR THE PRESENCE OF INFLUENZA A ANTIGEN  INFECTION DUE TO INFLUENZA A CANNOT BE RULED OUT. BECAUSE THE ANTIGEN PRESENT IN THE SAMPLE MAY BE BELOW  THE DETECTION LIMIT OF THE TEST. A NEGATIVE TEST IS PRESUMPTIVE AND IT IS RECOMMENDED THAT THESE RESULTS BE CONFIRMED BY VIRAL CULTURE OR AN FDA-CLEARED INFLUENZA A AND B MOLECULAR ASSAY. Influenza B Ag NEGATIVE         Comment: NEGATIVE FOR THE PRESENCE OF INFLUENZA B ANTIGEN  INFECTION DUE TO INFLUENZA B CANNOT BE RULED OUT. BECAUSE THE ANTIGEN PRESENT IN THE SAMPLE MAY BE BELOW  THE DETECTION LIMIT OF THE TEST. A NEGATIVE TEST IS PRESUMPTIVE AND IT IS RECOMMENDED THAT THESE RESULTS BE CONFIRMED BY VIRAL CULTURE OR AN FDA-CLEARED INFLUENZA A AND B MOLECULAR ASSAY.           Source NASOPHARYNGEAL             Labs: Results:       BMP, Mg, Phos Recent Labs     11/30/18  1504      K 4.6      CO2 30   AGAP 6*   BUN 22   CREA 0.65   CA 9.0   GLU 75      CBC Recent Labs     11/30/18  1504   WBC 8.6   RBC 4.49   HGB 12.9   HCT 40.9      GRANS 58   LYMPH 30   EOS 0*   MONOS 11   BASOS 1   IG 0   ANEU 5.0   ABL 2.6   LAWRENCE 0.0   ABM 0.9   ABB 0.0   AIG 0.0      LFT Recent Labs     11/30/18  1504   SGOT 20   ALT 21   AP 85   TP 6.8   ALB 3.4   GLOB 3.4   AGRAT 1.0*      Cardiac Testing Lab Results   Component Value Date/Time     (H) 02/05/2018 03:20 PM    Troponin-I, Qt. <0.02 (L) 04/29/2018 05:34 AM      Coagulation Tests Lab Results   Component Value Date/Time    Prothrombin time 14.9 (H) 09/20/2018 04:10 AM    Prothrombin time 15.4 (H) 09/19/2018 04:30 AM    Prothrombin time 20.2 (H) 09/18/2018 04:14 AM    INR 1.2 09/20/2018 04:10 AM    INR 1.3 09/19/2018 04:30 AM    INR 1.8 09/18/2018 04:14 AM    aPTT 49.3 (H) 02/06/2018 01:04 AM      A1c No results found for: HBA1C, HGBE8, VYC6AVFJ   Lipid Panel No results found for: CHOL, CHOLPOCT, CHOLX, CHLST, CHOLV, 860374, HDL, LDL, LDLC, DLDLP, 830568, VLDLC, VLDL, TGLX, TRIGL, TRIGP, TGLPOCT, CHHD, CHHDX   Thyroid Panel Lab Results   Component Value Date/Time    TSH 0.287 (L) 09/17/2018 04:31 AM    TSH 0.175 (L) 02/06/2018 01:04 AM    T4, Free 1.4 02/06/2018 11:33 AM    T3, total 0.72 02/06/2018 12:16 PM        Most Recent UA Lab Results   Component Value Date/Time    Color YELLOW 11/30/2018 03:40 PM    Appearance CLEAR 11/30/2018 03:40 PM    Specific gravity 1.026 (H) 11/30/2018 03:40 PM    pH (UA) 5.5 11/30/2018 03:40 PM    Protein NEGATIVE  11/30/2018 03:40 PM    Glucose NEGATIVE  11/30/2018 03:40 PM    Ketone TRACE (A) 11/30/2018 03:40 PM    Bilirubin SMALL (A) 11/30/2018 03:40 PM    Blood TRACE (A) 11/30/2018 03:40 PM    Urobilinogen 1.0 11/30/2018 03:40 PM    Nitrites NEGATIVE  11/30/2018 03:40 PM    Leukocyte Esterase NEGATIVE  11/30/2018 03:40 PM    WBC 0-3 11/30/2018 03:40 PM    RBC 10-20 11/30/2018 03:40 PM    Epithelial cells 0-3 11/30/2018 03:40 PM    Bacteria 0 11/30/2018 03:40 PM    Casts 5-10 11/30/2018 03:40 PM    Crystals, urine 0 02/06/2018 01:04 AM    Mucus 0 02/06/2018 01:04 AM    Other observations RESULTS VERIFIED MANUALLY 02/06/2018 01:04 AM        Allergies   Allergen Reactions    Codeine Unknown (comments)    Penicillins Unknown (comments)    Sulfa (Sulfonamide Antibiotics) Unknown (comments)     Immunization History   Administered Date(s) Administered    Influenza Vaccine (Quad) PF 09/21/2018    TB Skin Test (PPD) Intradermal 02/06/2018, 09/19/2018       All Labs from Last 24 Hrs:  Recent Results (from the past 24 hour(s))   EKG, 12 LEAD, INITIAL    Collection Time: 11/30/18  2:31 PM   Result Value Ref Range    Ventricular Rate 92 BPM    Atrial Rate 202 BPM    QRS Duration 76 ms    Q-T Interval 332 ms    QTC Calculation (Bezet) 410 ms    Calculated R Axis 63 degrees    Calculated T Axis 78 degrees    Diagnosis       !! AGE AND GENDER SPECIFIC ECG ANALYSIS !! sinus tachycardia with PAC's and baseline artifact  Non-specific ST-t wave changes  Abnormal ECG    Confirmed by Fred Sow MD ()DARREN ([id number]) on 11/30/2018 5:07:29 PM     POC TROPONIN-I    Collection Time: 11/30/18  3:02 PM   Result Value Ref Range    Troponin-I (POC) 0 (L) 0.02 - 0.05 ng/ml   CBC WITH AUTOMATED DIFF    Collection Time: 11/30/18  3:04 PM   Result Value Ref Range    WBC 8.6 4.3 - 11.1 K/uL    RBC 4.49 4.05 - 5.2 M/uL    HGB 12.9 11.7 - 15.4 g/dL    HCT 40.9 35.8 - 46.3 %    MCV 91.1 79.6 - 97.8 FL    MCH 28.7 26.1 - 32.9 PG    MCHC 31.5 31.4 - 35.0 g/dL    RDW 14.0 11.9 - 14.6 %    PLATELET 670 562 - 939 K/uL    MPV 9.9 9.4 - 12.3 FL    ABSOLUTE NRBC 0.00 0.0 - 0.2 K/uL    DF AUTOMATED      NEUTROPHILS 58 43 - 78 %    LYMPHOCYTES 30 13 - 44 %    MONOCYTES 11 4.0 - 12.0 %    EOSINOPHILS 0 (L) 0.5 - 7.8 %    BASOPHILS 1 0.0 - 2.0 %    IMMATURE GRANULOCYTES 0 0.0 - 5.0 %    ABS. NEUTROPHILS 5.0 1.7 - 8.2 K/UL    ABS. LYMPHOCYTES 2.6 0.5 - 4.6 K/UL    ABS. MONOCYTES 0.9 0.1 - 1.3 K/UL    ABS. EOSINOPHILS 0.0 0.0 - 0.8 K/UL    ABS. BASOPHILS 0.0 0.0 - 0.2 K/UL    ABS. IMM. GRANS. 0.0 0.0 - 0.5 K/UL   METABOLIC PANEL, COMPREHENSIVE    Collection Time: 11/30/18  3:04 PM   Result Value Ref Range    Sodium 142 136 - 145 mmol/L    Potassium 4.6 3.5 - 5.1 mmol/L    Chloride 106 98 - 107 mmol/L    CO2 30 21 - 32 mmol/L    Anion gap 6 (L) 7 - 16 mmol/L    Glucose 75 65 - 100 mg/dL    BUN 22 8 - 23 MG/DL    Creatinine 0.65 0.6 - 1.0 MG/DL    GFR est AA >60 >60 ml/min/1.73m2    GFR est non-AA >60 >60 ml/min/1.73m2    Calcium 9.0 8.3 - 10.4 MG/DL    Bilirubin, total 0.3 0.2 - 1.1 MG/DL    ALT (SGPT) 21 12 - 65 U/L    AST (SGOT) 20 15 - 37 U/L    Alk.  phosphatase 85 50 - 136 U/L    Protein, total 6.8 6.3 - 8.2 g/dL    Albumin 3.4 3.2 - 4.6 g/dL    Globulin 3.4 2.3 - 3.5 g/dL    A-G Ratio 1.0 (L) 1.2 - 3.5     URINALYSIS W/ RFLX MICROSCOPIC    Collection Time: 11/30/18  3:40 PM   Result Value Ref Range    Color YELLOW      Appearance CLEAR      Specific gravity 1.026 (H) 1.001 - 1.023      pH (UA) 5.5 5.0 - 9.0      Protein NEGATIVE  NEG mg/dL    Glucose NEGATIVE  mg/dL    Ketone TRACE (A) NEG mg/dL    Bilirubin SMALL (A) NEG      Blood TRACE (A) NEG      Urobilinogen 1.0 0.2 - 1.0 EU/dL    Nitrites NEGATIVE  NEG      Leukocyte Esterase NEGATIVE  NEG      WBC 0-3 0 /hpf    RBC 10-20 0 /hpf    Epithelial cells 0-3 0 /hpf    Bacteria 0 0 /hpf    Casts 5-10 0 /lpf   CULTURE, URINE    Collection Time: 11/30/18  3:40 PM   Result Value Ref Range    Special Requests: NO SPECIAL REQUESTS      Culture result:        NO GROWTH AFTER SHORT PERIOD OF INCUBATION. FURTHER RESULTS TO FOLLOW AFTER OVERNIGHT INCUBATION. CULTURE, BLOOD    Collection Time: 11/30/18  3:53 PM   Result Value Ref Range    Special Requests: RIGHT  ARM        Culture result: NO GROWTH AFTER 11 HOURS     CULTURE, BLOOD    Collection Time: 11/30/18  3:53 PM   Result Value Ref Range    Special Requests: LEFT  FOREARM        Culture result: NO GROWTH AFTER 11 HOURS     CULTURE, WOUND W GRAM STAIN    Collection Time: 11/30/18  4:53 PM   Result Value Ref Range    Special Requests: NO SPECIAL REQUESTS      GRAM STAIN PENDING     Culture result:        NO GROWTH AFTER SHORT PERIOD OF INCUBATION. FURTHER RESULTS TO FOLLOW AFTER OVERNIGHT INCUBATION.    INFLUENZA A & B AG (RAPID TEST)    Collection Time: 11/30/18  4:58 PM   Result Value Ref Range    Influenza A Ag NEGATIVE  NEG      Influenza B Ag NEGATIVE  NEG      Source NASOPHARYNGEAL         Current Med List in Hospital:   Current Facility-Administered Medications   Medication Dose Route Frequency    cefTRIAXone (ROCEPHIN) 1 g in 0.9% sodium chloride (MBP/ADV) 50 mL  1 g IntraVENous Q24H    aspirin delayed-release tablet 81 mg  81 mg Oral DAILY    bisacodyl (DULCOLAX) suppository 10 mg  10 mg Rectal DAILY PRN    carbidopa-levodopa (SINEMET)  mg per tablet 1 Tab  1 Tab Oral QID    carbidopa-levodopa ER (SINEMET CR)  mg per tablet 2 Tab  2 Tab Oral QHS    docusate sodium (COLACE) capsule 100 mg  100 mg Oral BID    gabapentin (NEURONTIN) capsule 100 mg  100 mg Oral TID    LORazepam (ATIVAN) tablet 1 mg  1 mg Oral Q6H PRN    simvastatin (ZOCOR) tablet 20 mg  20 mg Oral QHS    metoprolol succinate (TOPROL-XL) XL tablet 25 mg  25 mg Oral BID    oxyCODONE IR (ROXICODONE) tablet 5 mg  5 mg Oral Q6H PRN    QUEtiapine (SEROquel) tablet 12.5 mg  12.5 mg Oral BID    sodium chloride (NS) flush 5-10 mL  5-10 mL IntraVENous Q8H    sodium chloride (NS) flush 5-10 mL  5-10 mL IntraVENous PRN    acetaminophen (TYLENOL) tablet 650 mg  650 mg Oral Q4H PRN    heparin (porcine) injection 5,000 Units  5,000 Units SubCUTAneous Q8H       Discharge Exam:  Patient Vitals for the past 24 hrs:   Temp Pulse Resp BP SpO2   12/01/18 0732 97.5 °F (36.4 °C) 95 20 151/73 99 %   12/01/18 0330 97.6 °F (36.4 °C) 70 18 146/80 99 %   11/30/18 2349 98.4 °F (36.9 °C) 82 18 154/89 96 %   11/30/18 2006 98.4 °F (36.9 °C) 93 20 165/84 98 %   11/30/18 1813  90 18  91 %   11/30/18 1809  88   98 %   11/30/18 1758   (!) 46 157/78    11/30/18 1650  87 (!) 36 157/78 100 %   11/30/18 1543  100 (!) 38  100 %   11/30/18 1538 100.3 °F (37.9 °C)       11/30/18 1431 98.5 °F (36.9 °C) 100 18 (!) 158/131 100 %     Oxygen Therapy  O2 Sat (%): 99 % (12/01/18 0732)  Pulse via Oximetry: 77 beats per minute (11/30/18 1809)  O2 Device: Room air (12/01/18 0728)    Intake/Output Summary (Last 24 hours) at 12/1/2018 1022  Last data filed at 12/1/2018 0935  Gross per 24 hour   Intake 410 ml   Output    Net 410 ml       *Note that automatically entered I/Os may not be accurate; dependent on patient compliance with collection and accurate  by assistants. General:    Well nourished. Alert. Eyes:   Normal sclera. Extraocular movements intact. ENT:  Normocephalic, atraumatic. Moist mucous membranes  CV:   Regular rate and rhythm. No murmur, rub, or gallop. Lungs:  Clear to auscultation bilaterally. No wheezing, rhonchi, or rales. Abdomen: Soft, nontender, nondistended. Bowel sounds normal.   Extremities: Warm and dry. No cyanosis or edema. Neurologic: CN II-XII grossly intact. Sensation intact. Skin:     No rashes or jaundice. Psych:  Normal mood and affect. Discharge Info:   Current Discharge Medication List      CONTINUE these medications which have NOT CHANGED    Details   acetaminophen (TYLENOL) 325 mg tablet Take 2 Tabs by mouth every six (6) hours as needed. Qty: 120 Tab, Refills: 0      oxyCODONE IR (ROXICODONE) 5 mg immediate release tablet Take 1 Tab by mouth every six (6) hours as needed. Max Daily Amount: 20 mg. Indications: Pain  Qty: 28 Tab, Refills: 0    Associated Diagnoses: Decubitus ulcer of sacral region, unspecified ulcer stage      LORazepam (ATIVAN) 1 mg tablet Take 1 Tab by mouth every six (6) hours as needed for Anxiety. Max Daily Amount: 4 mg. Indications: anxiety  Qty: 28 Tab, Refills: 0    Associated Diagnoses: Parkinson disease ([address] Dementia due to Parkinson's disease with behavioral disturbance (HCC)      docusate sodium (COLACE) 100 mg capsule Take 1 Cap by mouth two (2) times a day for 90 days. Qty: 60 Cap, Refills: 2      aspirin delayed-release 81 mg tablet Take 1 Tab by mouth daily. Indications: Cerebral Thromboembolism Prevention  Qty: 30 Tab, Refills: 0      gabapentin (NEURONTIN) 100 mg capsule Take 1 Cap by mouth three (3) times daily. Qty: 60 Cap, Refills: 0      Venlafaxine 150 mg tr24 Take  by mouth.      carbidopa-levodopa (SINEMET-25/250)  mg per tablet Take 1 Tab by mouth four (4) times daily.   Qty: 360 Tab, Refills: 3    Associated Diagnoses: Parkinson disease ([facility]      carbidopa-levodopa ER (SINEMET CR)  mg per tablet 1 tab Qhs  Qty: 90 Tab, Refills: 3    Associated Diagnoses: Parkinson disease ([facility]      lovastatin (MEVACOR) 40 mg tablet Take 40 mg by mouth nightly. metoprolol succinate (TOPROL-XL) 25 mg XL tablet Take  by mouth two (2) times a day. bisacodyl (DULCOLAX) 10 mg suppository Insert 10 mg into rectum daily as needed. Qty: 30 Suppository, Refills: 0      QUEtiapine (SEROQUEL) 25 mg tablet TAKE 1/2 TABS BY MOUTH TWO (2) TIMES A DAY. Qty: 30 Tab, Refills: 4    Associated Diagnoses: Parkinson disease ([facility]      melatonin 3 mg tablet Take 1 Tab by mouth nightly. Qty: 30 Tab, Refills: 0    Associated Diagnoses: RBD (REM behavioral disorder)      omega-3 fatty acids-vitamin e (FISH OIL) 1,000 mg cap Take 1 Cap by mouth. STOP taking these medications       cephALEXin (KEFLEX) 500 mg capsule Comments:   Reason for Stopping:                 Disposition: home    Activity: Activity as tolerated  Diet: DIET CARDIAC Regular    Follow-up Appointments   Procedures    FOLLOW UP VISIT Appointment in: 3 - 5 Days PCP for follow up     PCP for follow up     Standing Status:   Standing     Number of Occurrences:   1     Order Specific Question:   Appointment in     Answer:   3 - 5 Days         Follow-up Information     Follow up With Specialties Details Why Contact Info    Bobby Salmeron MD Family Practice Call make appointment for follow up [address] [place] [address]  [phone]            Time spent in patient discharge planning and coordination 35 minutes.     Signed:  Carmella Hernandez MD

## 2018-12-01 NOTE — ED NOTES
Pt took out iv in right hand and has removed all gowns and blankets. Pt has removed ekg stickers and pulse ox. Pt hand wrapped with coban. Gown placed on pt.

## 2018-12-01 NOTE — PROGRESS NOTES
CADEN recevied from [address]. Patient remains in stable condition with respirations even/unlabored. No acute distress noted at this time. Tremors noted. Call light remains within reach, patient encouraged to call nurse prn assist. Will continue to monitor per policy.

## 2018-12-01 NOTE — ED NOTES
TRANSFER - OUT REPORT: 
 
Verbal report given to Elaine Manuel RN on Elsa Deal  being transferred to 8th floor for routine progression of care Report consisted of patients Situation, Background, Assessment and  
Recommendations(SBAR). Information from the following report(s) ED Summary was reviewed with the receiving nurse. Lines:  
Peripheral IV 11/30/18 Left Forearm (Active) Site Assessment Clean, dry, & intact 11/30/2018  3:59 PM  
Phlebitis Assessment 0 11/30/2018  3:59 PM  
Infiltration Assessment 0 11/30/2018  3:59 PM  
Dressing Status Clean, dry, & intact 11/30/2018  3:59 PM  
  
 
Opportunity for questions and clarification was provided. Patient transported with: 
 transporter

## 2018-12-01 NOTE — DISCHARGE INSTRUCTIONS
Parkinson's Disease: Care Instructions  Your Care Instructions  Parkinson's disease can cause tremors, stiffness, and problems with movement. Severe or advanced cases can also cause problems with thinking. In Parkinson's disease, part of the brain cannot make enough dopamine, a chemical that helps control movement. Taking your medicines correctly and getting regular exercise may help you maintain your quality of life. There are many things that can cause Parkinson's disease symptoms, including some medicine, some toxins, and trauma to the head. The cause in most cases is not known. Follow-up care is a key part of your treatment and safety. Be sure to make and go to all appointments, and call your doctor if you are having problems. It's also a good idea to know your test results and keep a list of the medicines you take. How can you care for yourself at home? General care  · Take your medicines exactly as prescribed. Call your doctor if you think you are having a problem with your medicine. · Make sure your home is safe:  ? Place furniture so that you have something to hold on to as you walk around the house. ? Use chairs that make it easier to sit down and stand up. ? Group the things you use most, such as reading glasses, keys, and the telephone, in one easy-to-reach place. ? Tack down rugs so that you do not trip. ? Put no-slip tape and handrails in the tub to prevent falls. · Use a cane, walker, or scooter if your doctor suggests it. · Keep up your normal activities as much as you can. · Find ways to manage stress, which can make symptoms worse. · Spend time with family and friends. Join a support group for people with Parkinson's disease if you want extra help. · Depression is common with this condition. Tell your doctor if you have trouble sleeping, are eating too much or are not hungry, or feel sad or tearful all the time. Depression can be treated with medicine and counseling.   Diet and exercise  · Eat a balanced diet. · If you are taking levodopa, do not eat protein at the same time you take your medicine. Levodopa may not work as well if you take it at the same time you eat protein. You can eat normal amounts of protein. Talk to your doctor if you have questions. · If you have problems swallowing, change how and what you eat:  ? Try thick drinks, such as milk shakes. They are easier to swallow than other fluids. ? Do not eat foods that crumble easily. These can cause choking. ? Use a  to prepare food. Soft foods need less chewing. ? Eat small meals often so that you do not get tired from eating heavy meals. · Drink plenty of water and eat a high-fiber diet to prevent constipation. Parkinson's--and the medicines that treat it--may slow your intestines. · Get exercise on most days. Work with your doctor to set up a program of walking, swimming, or other exercise you are able to do. When should you call for help? Call your doctor now or seek immediate medical care if:    · You have a change in your symptoms.     · You develop other problems from your condition, such as:  ? Injury from a fall. ? Thinking or memory problems. ? A urinary tract infection (burning pain when urinating).    Watch closely for changes in your health, and be sure to contact your doctor if:    · You lose weight because of problems with eating.     · You want more information about your condition or your medicines. Where can you learn more? Go to http://rylee-harvey.info/. Enter K895 in the search box to learn more about \"Parkinson's Disease: Care Instructions. \"  Current as of: June 4, 2018  Content Version: 11.8  © 2526-6079 [employer]. Care instructions adapted under license by [employer] (which disclaims liability or warranty for this information).  If you have questions about a medical condition or this instruction, always ask your healthcare professional. [employer], [facility] disclaims any warranty or liability for your use of this information. Pressure Injuries: Care Instructions  Your Care Instructions    A pressure injury on the skin is caused by constant pressure to that area. These injuries--also called decubitus ulcers or bedsores--may happen when you lie in bed or sit in a wheelchair for a long time. The constant pressure blocks the blood supply to the skin. This causes skin cells to die and creates a sore. Pressure injuries usually occur over bony areas, such as the hips, lower back, elbows, heels, and shoulders. They also can occur in places where the skin folds over on itself. You may have mild redness or open sores that are harder to heal.  Good care at home can help heal pressure injuries. You or your caregiver needs to check your skin every day for sores. You need good nutrition and plenty of fluids to keep your skin healthy and prevent new pressure injuries. Follow-up care is a key part of your treatment and safety. Be sure to make and go to all appointments, and call your doctor if you are having problems. It's also a good idea to know your test results and keep a list of the medicines you take. How can you care for yourself at home? · If your doctor prescribed a medicated ointment or cream, use it exactly as prescribed. Call your doctor if you think you are having a problem with your medicine. · Wash pressure injuries every day, or as often as your doctor recommends. Most tap water is safe, but follow the advice of your doctor or nurse. He or she may recommend that you use a saline solution. This is a salt and water solution that you can buy over the counter. · Put on bandages as your doctor or wound care specialist says. · Keep healthy tissue around the sore clean and dry. · Check your skin every day for sores (or have a caregiver do it).   · If you know what is causing the pressure that caused the sore, find a way to remove that pressure. To prevent pressure injuries  · Change your position or have your caregiver help you change your position often. You may need to do this every 2 hours if you are in bed or every 15 minutes if you are in a wheelchair. This lowers the chance of making sores worse and getting new sores. · Use special mattresses or other support. These may include low-pressure mattresses or cushions made of foam that can be filled with air, water, beads, or fiber. · Eat healthy foods with plenty of protein to help heal damaged skin and to help new skin grow. · Try to stay at a healthy weight. Being overweight can lead to more pressure on your skin. · Do not slide across sheets or slump in a chair or bed. · Do not smoke. Smoking dries the skin and reduces its blood supply. If you need help quitting, talk to your doctor about stop-smoking programs and medicines. These can increase your chances of quitting for good. When should you call for help? Call your doctor now or seek immediate medical care if:    · You have signs of infection, such as:  ? Increased pain, swelling, warmth, or redness. ? Red streaks leading from the sore. ? Pus draining from the sore. ? A fever.    Watch closely for changes in your health, and be sure to contact your doctor if:    · Your pressure injuries are not healing.     · You have new pressure injuries.     · You need help changing positions in bed or in a chair.     · Your caregiver needs help to move you. Where can you learn more? Go to http://rylee-harvey.info/. Enter F114 in the search box to learn more about \"Pressure Injuries: Care Instructions. \"  Current as of: November 21, 2017  Content Version: 11.8  © 2209-2715 [employer]. Care instructions adapted under license by [employer] (which disclaims liability or warranty for this information).  If you have questions about a medical condition or this instruction, always ask your healthcare professional. [address] any warranty or liability for your use of this information. Sepsis: Care Instructions  Your Care Instructions    Sepsis is an intense reaction to an infection. It can cause deadly damage to the body and lead to a dangerously low blood pressure. You may have inflammation across large areas of your body. It can damage tissue and even go deep into your organs. Infections that can lead to sepsis include:  · A skin infection such as from a cut. · A lung infection like pneumonia. · A kidney infection. · A gut infection such as E. coli. It's important to care for yourself and try to avoid infections so that you don't get sepsis again. Follow-up care is a key part of your treatment and safety. Be sure to make and go to all appointments, and call your doctor if you are having problems. It's also a good idea to know your test results and keep a list of the medicines you take. How can you care for yourself at home? · If your doctor prescribed antibiotics, take them as directed. Do not stop taking them just because you feel better. You need to take the full course of antibiotics. · Help prevent infections that could lead to sepsis:  ? Try to avoid colds and flu. If you must be around people who have a cold or the flu, wash your hands often. And get a flu vaccine every year. ? Get a pneumococcal vaccine shot (to prevent pneumonia, meningitis, and other infections). If you have had one before, ask your doctor if you need another dose. ? Clean any wounds or scrapes. · Do not smoke or use other tobacco products. When you quit smoking, you are less likely to get a cold, the flu, bronchitis, and pneumonia. If you need help quitting, talk to your doctor about stop-smoking programs and medicines. These can increase your chances of quitting for good. · To prevent dehydration, drink plenty of fluids.  Choose water and other caffeine-free clear liquids until you feel better. If you have kidney, heart, or liver disease and have to limit fluids, talk with your doctor before you increase the amount of fluids you drink. · Eat a healthy diet. Include fruits, vegetables, and whole grains in your diet every day. · If your doctor recommends it, try doing some physical activity. Walking is a good choice. Bit by bit, increase the amount you walk every day. When should you call for help? [id number] anytime you think you may need emergency care. For example, call if:    · You passed out (lost consciousness).    Call your doctor now or seek immediate medical care if:    · You have symptoms of sepsis. These may include:  ? Shortness of breath. ? A fast heart rate. ? Cool, pale, or clammy skin. ? Feeling confused.     · You are dizzy or lightheaded, or you feel like you may faint.     · You have a fever or chills.    Watch closely for changes in your health, and be sure to contact your doctor if:    · You do not get better as expected. Where can you learn more? Go to http://rylee-harvey.info/. Enter X152 in the search box to learn more about \"Sepsis: Care Instructions. \"  Current as of: November 20, 2017  Content Version: 11.8  © 9845-1996 [employer]. Care instructions adapted under license by [employer] (which disclaims liability or warranty for this information). If you have questions about a medical condition or this instruction, always ask your healthcare professional. [place] [address] any warranty or liability for your use of this information.       DISCHARGE SUMMARY from Nurse    PATIENT INSTRUCTIONS:    After general anesthesia or intravenous sedation, for 24 hours or while taking prescription Narcotics:  · Limit your activities  · Do not drive and operate hazardous machinery  · Do not make important personal or business decisions  · Do  not drink alcoholic beverages  · If you have not urinated within 8 hours after discharge, please contact your surgeon on call. Report the following to your surgeon:  · Excessive pain, swelling, redness or odor of or around the surgical area  · Temperature over 100.5  · Nausea and vomiting lasting longer than 4 hours or if unable to take medications  · Any signs of decreased circulation or nerve impairment to extremity: change in color, persistent  numbness, tingling, coldness or increase pain  · Any questions    What to do at Home:  *  Please give a list of your current medications to your Primary Care Provider. *  Please update this list whenever your medications are discontinued, doses are      changed, or new medications (including over-the-counter products) are added. *  Please carry medication information at all times in case of emergency situations. These are general instructions for a healthy lifestyle:    No smoking/ No tobacco products/ Avoid exposure to second hand smoke  Surgeon General's Warning:  Quitting smoking now greatly reduces serious risk to your health. Obesity, smoking, and sedentary lifestyle greatly increases your risk for illness    A healthy diet, regular physical exercise & weight monitoring are important for maintaining a healthy lifestyle    You may be retaining fluid if you have a history of heart failure or if you experience any of the following symptoms:  Weight gain of 3 pounds or more overnight or 5 pounds in a week, increased swelling in our hands or feet or shortness of breath while lying flat in bed. Please call your doctor as soon as you notice any of these symptoms; do not wait until your next office visit. Recognize signs and symptoms of STROKE:    F-face looks uneven    A-arms unable to move or move unevenly    S-speech slurred or non-existent    T-time-call 911 as soon as signs and symptoms begin-DO NOT go       Back to bed or wait to see if you get better-TIME IS BRAIN.     Warning Signs of HEART ATTACK     Call 911 if you have these symptoms:   Chest discomfort. Most heart attacks involve discomfort in the center of the chest that lasts more than a few minutes, or that goes away and comes back. It can feel like uncomfortable pressure, squeezing, fullness, or pain.  Discomfort in other areas of the upper body. Symptoms can include pain or discomfort in one or both arms, the back, neck, jaw, or stomach.  Shortness of breath with or without chest discomfort.  Other signs may include breaking out in a cold sweat, nausea, or lightheadedness. Don't wait more than five minutes to call 911 - MINUTES MATTER! Fast action can save your life. Calling 911 is almost always the fastest way to get lifesaving treatment. Emergency Medical Services staff can begin treatment when they arrive -- up to an hour sooner than if someone gets to the hospital by car. The discharge information has been reviewed with the patient. The patient verbalized understanding. Discharge medications reviewed with the patient and appropriate educational materials and side effects teaching were provided.   ___________________________________________________________________________________________________________________________________

## 2018-12-01 NOTE — PROGRESS NOTES
Case management spoke with spouse and patient will discharge via [facility]. AVS placed in packet to take to spouse.

## 2018-12-01 NOTE — PROGRESS NOTES
TRANSFER - IN REPORT: 
 
Verbal report received from Maddison Singh RN on Mallory Mood  being received from ED(unit) for routine progression of care Report consisted of patients Situation, Background, Assessment and  
Recommendations(SBAR). Information from the following report(s) SBAR, Kardex and ED Summary was reviewed with the receiving nurse. Opportunity for questions and clarification was provided. Assessment completed upon patients arrival to unit and care assumed.

## 2018-12-01 NOTE — PROGRESS NOTES
Initial visit to assess spiritual needs & consukt re HCPOA. Pt was attempting to pull out her IV lines, to prepare dinner; alerted nurse. Ministry of presence & prayer to demonstrate caring & concern.  
 
 
Chaplain Dianne Elizabeth, MDiv,ThM,PhD

## 2018-12-01 NOTE — PROGRESS NOTES
Patient discharged via stretcher to [facility] ambulance service to be taken home with hospice care. Patient in stable condition and remains confused. No further needs noted.

## 2018-12-01 NOTE — PROGRESS NOTES
No one in room with patient. Pt is confused. Case management contacting family to notify of discharge.

## 2018-12-01 NOTE — PROGRESS NOTES
Spoke to patients  Jaswinder Morales ([phone]) who is health care proxy regarding patient's admission database.  states he cannot find her medication list at this time and when his son Karen Lemus ([phone]) gets home to help him they will call back this night or in the morning to answer admission questions.

## 2018-12-01 NOTE — PROGRESS NOTES
CM made contact with patient spouse Ivy Hernandez) and discuss patient d/c plan. Spouse provided information to complete intervention. Spouse states that patient is currently active with [facility]. States that patient will be returning back home and resuming hospice services. CM made contact with [place] from [facility] and informed her that patient was being d/c back home today. Per [place] patient services hadn't been revoke. [place] provided a fax # [phone] to faxed patient information. Made spouse aware that patient would be transported home by [facility]. Care Management Interventions PCP Verified by CM: Yes(Akila Salmeron,) Mode of Transport at Discharge: Other (see comment)([facility]) Transition of Care Consult (CM Consult): Discharge Planning, Home Hospice(Patient active with [facility]) [facility] Hospice: No 
Reason Outside [place]: Patient already serviced by other home care/hospice agency Discharge Durable Medical Equipment: No 
Physical Therapy Consult: No 
Occupational Therapy Consult: No 
Speech Therapy Consult: No 
Current Support Network: Own Home, Lives with Spouse Confirm Follow Up Transport: Other (see comment)([facility]) Plan discussed with Pt/Family/Caregiver: Yes Freedom of Choice Offered: Yes [employer] Information Provided?: No 
Discharge Location Discharge Placement: Home with hospice

## 2018-12-03 LAB
BACTERIA SPEC CULT: NORMAL
SERVICE CMNT-IMP: NORMAL

## 2018-12-06 LAB
BACTERIA SPEC CULT: ABNORMAL
GRAM STN SPEC: ABNORMAL
SERVICE CMNT-IMP: ABNORMAL

## 2019-01-17 ENCOUNTER — PATIENT OUTREACH (OUTPATIENT)
Dept: CASE MANAGEMENT | Age: 69
End: 2019-01-17

## 2019-01-17 NOTE — PROGRESS NOTES
Referral received from RN. TC to [facility]. Left message with the  requesting a return call from their SW, Alyssa Barrera. 1131:  Return call received from [facility], [place]. He confirmed that he is the pt's SW and has been working with the pt/family for the last 2 months. SW explained to him the nature of the referral from the neurologist and the outcome of the [facility] RN's call to the family today about the referral.   
 
He stated that he has addressed these issues with the family on multiple occasions and have given them suggestions and resources. He stated that the pt's spouse has indicated to him that he wants to have someone move in with them and be the pt's caregiver in exchange for room and board and he would also pay them $150/week. He stated that the pt's spouse has asked around but can not find anyone that will take that offer and he has made no effort to find someone otherwise. He stated that the son does live in the home but does work. The spouse has stated to him that he wants the pt to go to a SNF for long term care. The SW has discussed with the spouse the need to start the medicaid application process. He stated that the pt and spouse keep their finances separate and the spouse does not know how much the pt receives or what her financial resources are as their son handles and is in control of the pt's finances. The hospice SW stated that he has offered to assist the pt's son with any medicaid applications, etc., but he has not been contacted by the son. He did suggest to the spouse that they should seek the advice of an  to discuss the pt's long term care and financial needs but to his knowledge they have not done so. SW confirmed that he visited the pt/spouse last week and plans a follow up visit with them in the next two weeks.   He stated that he will contact the pt's spouse as soon as possible by phone to address the issues in the NP referral to [facility]. This writer offered assistance if needed but no outreaches to the pt/family are planned. This note will not be viewable in [address]